# Patient Record
Sex: MALE | Race: BLACK OR AFRICAN AMERICAN | Employment: FULL TIME | URBAN - METROPOLITAN AREA
[De-identification: names, ages, dates, MRNs, and addresses within clinical notes are randomized per-mention and may not be internally consistent; named-entity substitution may affect disease eponyms.]

---

## 2017-07-05 ENCOUNTER — OFFICE VISIT (OUTPATIENT)
Dept: SLEEP MEDICINE | Age: 37
End: 2017-07-05

## 2017-07-05 VITALS
BODY MASS INDEX: 21.47 KG/M2 | TEMPERATURE: 99 F | DIASTOLIC BLOOD PRESSURE: 80 MMHG | HEIGHT: 70 IN | HEART RATE: 81 BPM | WEIGHT: 150 LBS | SYSTOLIC BLOOD PRESSURE: 121 MMHG | OXYGEN SATURATION: 98 %

## 2017-07-05 DIAGNOSIS — G47.61 PERIODIC LIMB MOVEMENTS OF SLEEP: ICD-10-CM

## 2017-07-05 DIAGNOSIS — G47.50 PARASOMNIA: ICD-10-CM

## 2017-07-05 DIAGNOSIS — G47.33 OSA (OBSTRUCTIVE SLEEP APNEA): Primary | ICD-10-CM

## 2017-07-05 NOTE — PATIENT INSTRUCTIONS
217 [facility]., [place]. [place], [address]  Tel.  [phone]  Fax. 100 [facility] 60  [place], [address] [facility]  Tel.  [phone]  Fax. [phone] [address] Jill Rizo  Tel.  [phone]  Fax. [phone]     Sleep Apnea: After Your Visit  Your Care Instructions  Sleep apnea occurs when you frequently stop breathing for 10 seconds or longer during sleep. It can be mild to severe, based on the number of times per hour that you stop breathing or have slowed breathing. Blocked or narrowed airways in your nose, mouth, or throat can cause sleep apnea. Your airway can become blocked when your throat muscles and tongue relax during sleep. Sleep apnea is common, occurring in 1 out of 20 individuals. Individuals having any of the following characteristics should be evaluated and treated right away due to high risk and detrimental consequences from untreated sleep apnea:  1. Obesity  2. Congestive Heart failure  3. Atrial Fibrillation  4. Uncontrolled Hypertension  5. Type II Diabetes  6. Night-time Arrhythmias  7. Stroke  8. Pulmonary Hypertension  9. High-risk Driving Populations (pilots, truck drivers, etc.)  10. Patients Considering Weight-loss Surgery    How do you know you have sleep apnea? You probably have sleep apnea if you answer 'yes' to 3 or more of the following questions:  S - Have you been told that you Snore? T - Are you often Tired during the day? O - Has anyone Observed you stop breathing while sleeping? P- Do you have (or are being treated for) high blood Pressure? B - Are you obese (Body Mass Index > 35)? A - Is your Age 48years old or older? N - Is your Neck size greater than 16 inches? G - Are you male Gender? A sleep physician can prescribe a breathing device that prevents tissues in the throat from blocking your airway.  Or your doctor may recommend using a dental device (oral breathing device) to help keep your airway open. In some cases, surgery may be needed to remove enlarged tissues in the throat. Follow-up care is a key part of your treatment and safety. Be sure to make and go to all appointments, and call your doctor if you are having problems. It's also a good idea to know your test results and keep a list of the medicines you take. How can you care for yourself at home? · Lose weight, if needed. It may reduce the number of times you stop breathing or have slowed breathing. · Go to bed at the same time every night. · Sleep on your side. It may stop mild apnea. If you tend to roll onto your back, sew a pocket in the back of your pajama top. Put a tennis ball into the pocket, and stitch the pocket shut. This will help keep you from sleeping on your back. · Avoid alcohol and medicines such as sleeping pills and sedatives before bed. · Do not smoke. Smoking can make sleep apnea worse. If you need help quitting, talk to your doctor about stop-smoking programs and medicines. These can increase your chances of quitting for good. · Prop up the head of your bed 4 to 6 inches by putting bricks under the legs of the bed. · Treat breathing problems, such as a stuffy nose, caused by a cold or allergies. · Use a continuous positive airway pressure (CPAP) breathing machine if lifestyle changes do not help your apnea and your doctor recommends it. The machine keeps your airway from closing when you sleep. · If CPAP does not help you, ask your doctor whether you should try other breathing machines. A bilevel positive airway pressure machine has two types of air pressureâone for breathing in and one for breathing out. Another device raises or lowers air pressure as needed while you breathe. · If your nose feels dry or bleeds when using one of these machines, talk with your doctor about increasing moisture in the air. A humidifier may help.   · If your nose is runny or stuffy from using a breathing machine, talk with your doctor about using decongestants or a corticosteroid nasal spray. When should you call for help? Watch closely for changes in your health, and be sure to contact your doctor if:  · You still have sleep apnea even though you have made lifestyle changes. · You are thinking of trying a device such as CPAP. · You are having problems using a CPAP or similar machine. Where can you learn more? Go to [employer]. Enter C942 in the search box to learn more about \"Sleep Apnea: After Your Visit. \"   © 6405-6914 Healthwise, Incorporated. Care instructions adapted under license by [facility] (which disclaims liability or warranty for this information). This care instruction is for use with your licensed healthcare professional. If you have questions about a medical condition or this instruction, always ask your healthcare professional. Sheryl Levo any warranty or liability for your use of this information. PROPER SLEEP HYGIENE    What to avoid  · Do not have drinks with caffeine, such as coffee or black tea, for 8 hours before bed. · Do not smoke or use other types of tobacco near bedtime. Nicotine is a stimulant and can keep you awake. · Avoid drinking alcohol late in the evening, because it can cause you to wake in the middle of the night. · Do not eat a big meal close to bedtime. If you are hungry, eat a light snack. · Do not drink a lot of water close to bedtime, because the need to urinate may wake you up during the night. · Do not read or watch TV in bed. Use the bed only for sleeping and sexual activity. What to try  · Go to bed at the same time every night, and wake up at the same time every morning. Do not take naps during the day. · Keep your bedroom quiet, dark, and cool. · Get regular exercise, but not within 3 to 4 hours of your bedtime. .  · Sleep on a comfortable pillow and mattress.   · If watching the clock makes you anxious, turn it facing away from you so you cannot see the time. · If you worry when you lie down, start a worry book. Well before bedtime, write down your worries, and then set the book and your concerns aside. · Try meditation or other relaxation techniques before you go to bed. · If you cannot fall asleep, get up and go to another room until you feel sleepy. Do something relaxing. Repeat your bedtime routine before you go to bed again. · Make your house quiet and calm about an hour before bedtime. Turn down the lights, turn off the TV, log off the computer, and turn down the volume on music. This can help you relax after a busy day. Drowsy Driving  The [address] [place] [address] Road Traffic Safety Administration cites drowsiness as a causing factor in more than 560,339 police reported crashes annually, resulting in 76,000 injuries and 1,500 deaths. Other surveys suggest 55% of people polled have driven while drowsy in the past year, 23% had fallen asleep but not crashed, 3% crashed, and 2% had and accident due to drowsy driving. Who is at risk? Young Drivers: One study of drowsy driving accidents states that 55% of the drivers were under 25 years. Of those, 75% were male. Shift Workers and Travelers: People who work overnight or travel across time zones frequently are at higher risk of experiencing Circadian Rhythm Disorders. They are trying to work and function when their body is programed to sleep. Sleep Deprived: Lack of sleep has a serious impact on your ability to pay attention or focus on a task. Consistently getting less than the average of 8 hours your body needs creates partial or cumulative sleep deprivation. Untreated Sleep Disorders: Sleep Apnea, Narcolepsy, R.L.S., and other sleep disorders (untreated) prevent a person from getting enough restful sleep. This leads to excessive daytime sleepiness and increases the risk for drowsy driving accidents by up to 7 times.   Medications / Alcohol: Even over the counter medications can cause drowsiness. Medications that impair a drivers attention should have a warning label. Alcohol naturally makes you sleepy and on its own can cause accidents. Combined with excessive drowsiness its effects are amplified. Signs of Drowsy Driving:   * You don't remember driving the last few miles   * You may drift out of your cheyanne   * You are unable to focus and your thoughts wander   * You may yawn more often than normal   * You have difficulty keeping your eyes open / nodding off   * Missing traffic signs, speeding, or tailgating  Prevention-   Good sleep hygiene, lifestyle and behavioral choices have the most impact on drowsy driving. There is no substitute for sleep and the average person requires 8 hours nightly. If you find yourself driving drowsy, stop and sleep. Consider the sleep hygiene tips provided during your visit as well. Medication Refill Policy: Refills for all medications require 1 week advance notice. Please have your pharmacy fax a refill request. We are unable to fax, or call in \"controled substance\" medications and you will need to pick these prescriptions up from our office. "[employer] Activation    Thank you for requesting access to "[employer]. Please follow the instructions below to securely access and download your online medical record. "[employer] allows you to send messages to your doctor, view your test results, renew your prescriptions, schedule appointments, and more. How Do I Sign Up? 1. In your internet browser, go to [email or web address]. 2. Click on the First Time User? Click Here link in the Sign In box. You will see the New Member Sign Up page. 3. Enter your "[employer] Access Code exactly as it appears below. You will not need to use this code after youve completed the sign-up process. If you do not sign up before the expiration date, you must request a new code.     "[employer] Access Code: [email or web address]  Expires: 10/3/2017  4:03 PM (This is the date your G[employer] access code will )    4. Enter the last four digits of your Social Security Number (xxxx) and Date of Birth (mm/dd/yyyy) as indicated and click Submit. You will be taken to the next sign-up page. 5. Create a [employer] ID. This will be your Glisten login ID and cannot be changed, so think of one that is secure and easy to remember. 6. Create a Glisten password. You can change your password at any time. 7. Enter your Password Reset Question and Answer. This can be used at a later time if you forget your password. 8. Enter your e-mail address. You will receive e-mail notification when new information is available in [address] 9. Click Sign Up. You can now view and download portions of your medical record. 10. Click the Download Summary menu link to download a portable copy of your medical information. Additional Information    If you have questions, please call [phone]. Remember, [employer] is NOT to be used for urgent needs. For medical emergencies, dial 911.

## 2017-07-05 NOTE — PROGRESS NOTES
217 [facility]., [place]. [place], [address]  Tel.  [phone]  Fax. 100 [facility] 60  [place], [address] [facility]  Tel.  [phone]  Fax. [phone] [address]  Tel.  [phone]  Fax. [phone]         Subjective:      Jd Ashley is an 29 y.o. male referred for evaluation for a sleep disorder. He complains of snoring associated with snorting, choking, periods of not breathing, tossing and turning, kicking and leg twitches. Symptoms began 5 years ago, gradually worsening since that time. He usually can fall asleep in 30 - 45 minutes. Family or house members note snoring, periods of not breathing. He denies completely or partially paralyzed while falling asleep or waking up. Jd Ashley does wake up frequently at night. He is bothered by waking up too early and left unable to get back to sleep. He actually sleeps about 5 hours at night and wakes up about 3 times during the night. He does not work shifts: Angelica Franklin indicates he does not get too little sleep at night. His bedtime is 2200. He awakens at 0300. He does not take naps. He has the following observed behaviors: Loud snoring, Light snoring, Sleep talking, Pauses in breathing, Twitching of legs or feet, Kicking with legs; He does report of an urge to move extremities due to discomfort or other sensation as well as of dream enactment behavior. He reports of trying to catch something and wakes up to find himself doing the same act. Other remarks: waking with a gasp    Elizabeth Sleepiness Score: 11 which reflect moderate daytime drowsiness. No Known Allergies      Current Outpatient Prescriptions:     esomeprazole (NEXIUM) 20 mg capsule, Take 1 Cap by mouth daily. , Disp: 30 Cap, Rfl: 6    melatonin 10 mg TbER, Take 1 Tab by mouth nightly., Disp: 30 Tab, Rfl: 6    magnesium oxide (MAG-OX) 400 mg tablet, Take 1 Tab by mouth nightly., Disp: 30 Tab, Rfl: 6     He  has a past medical history of Bleeding hemorrhoid; Elevated cholesterol; Hypertension; and Pancreatitis. He  has a past surgical history that includes gi and hemorrhoidectomy. He family history includes Breast Cancer in his mother; Heart Attack in his father; Hypertension in his father and mother; No Known Problems in his sister. He  reports that he has been smoking. He uses smokeless tobacco. He reports that he drinks about 16.8 oz of alcohol per week  He reports that he does not use illicit drugs. Review of Systems:  Constitutional:  No significant weight loss or weight gain  Eyes:  No blurred vision  CVS:  No significant chest pain  Pulm:  No significant shortness of breath  GI:  No significant nausea or vomiting  :  No significant nocturia  Musculoskeletal:  No significant joint pain at night  Skin:  No significant rashes  Neuro:  No significant dizziness   Psych:  No active mood issues    Sleep Review of Systems: notable for difficulty falling asleep; frequent awakenings at night;  regular dreaming noted; nightmares ;  early morning headaches; no memory problems; no concentration issues; no history of any automobile or occupational accidents due to daytime drowsiness. Objective:     Visit Vitals    /80    Pulse 81    Temp 99 °F (37.2 °C)    Ht 5' 10\" (1.778 m)    Wt 150 lb (68 kg)    SpO2 98%    BMI 21.52 kg/m2         General:   Not in acute distress   Eyes:  Anicteric sclerae, no obvious strabismus   Nose:  No obvious nasal septum deviation    Oropharynx:   Class 4 oropharyngeal outlet, thick tongue base, uvula could not be seen due to low-lying soft palate, narrow tonsilo-pharyngeal pilars   Tonsils:   tonsils are not seen due to low-lying soft palate   Neck:   Neck circ.  in \"inches\": 13.5; midline trachea   Chest/Lungs:  Equal lung expansion, clear on auscultation    CVS:  Normal rate, regular rhythm; no JVD   Skin:  Warm to touch; no obvious rashes   Neuro:  No focal deficits ; no obvious tremor    Psych:  Normal affect,  normal countenance;          Assessment:       ICD-10-CM ICD-9-CM    1. SIENA (obstructive sleep apnea) G47.33 327.23 SLEEP STUDY UNATTENDED, 4 CHANNEL   2. Periodic limb movements of sleep G47.61 327.51    3. Parasomnia G47.50 307.47          Plan:     * The patient currently has a Moderate Risk for having sleep apnea. STOP-BANG score 4.  * Sleep testing was ordered for initial evaluation. PSG if HSAT is negative for SIENA. * He was provided information on sleep apnea including coresponding risk factors and the importance of proper treatment. * Treatment options if indicated were reviewed today. Patient agrees to a trial of PAP therapy if indicated. * Counseling was provided regarding proper sleep hygiene (including effect of light on sleep), paradoxical intention, stimulus control and safe driving. * Effect of sleep disturbance on weight was reviewed. We have recommended a dedicated weight loss through appropriate diet and an exercise regiment as significant weight reduction has been shown to reduce severity of obstructive sleep apnea. * Telephone [phone]  follow-up shortly after sleep study to review results and plan final management.     (patient has given permission for a message to be left regarding test results and further management if patient cannot be cannot be reached directly). Thank you for allowing us to participate in your patient's medical care. We'll keep you updated on these investigations. Nayely Burton MD, [facility]  Electronically signed.  07/05/17

## 2017-07-11 ENCOUNTER — APPOINTMENT (OUTPATIENT)
Dept: MRI IMAGING | Age: 37
DRG: 092 | End: 2017-07-11
Attending: EMERGENCY MEDICINE
Payer: COMMERCIAL

## 2017-07-11 ENCOUNTER — HOSPITAL ENCOUNTER (INPATIENT)
Age: 37
LOS: 3 days | Discharge: HOME | DRG: 092 | End: 2017-07-14
Attending: EMERGENCY MEDICINE | Admitting: INTERNAL MEDICINE
Payer: COMMERCIAL

## 2017-07-11 ENCOUNTER — APPOINTMENT (OUTPATIENT)
Dept: CT IMAGING | Age: 37
DRG: 092 | End: 2017-07-11
Attending: EMERGENCY MEDICINE
Payer: COMMERCIAL

## 2017-07-11 DIAGNOSIS — G08 TRANSVERSE SINUS THROMBOSIS: ICD-10-CM

## 2017-07-11 DIAGNOSIS — G08 DURAL SINUS THROMBOSIS: Primary | ICD-10-CM

## 2017-07-11 DIAGNOSIS — G44.59 OTHER COMPLICATED HEADACHE SYNDROME: ICD-10-CM

## 2017-07-11 PROBLEM — R51.9 HEADACHE: Status: ACTIVE | Noted: 2017-07-11

## 2017-07-11 PROBLEM — S06.5XAA SUBDURAL HEMATOMA: Status: ACTIVE | Noted: 2017-07-11

## 2017-07-11 LAB
ALBUMIN SERPL BCP-MCNC: 4.3 G/DL (ref 3.5–5)
ALBUMIN/GLOB SERPL: 1.1 {RATIO} (ref 1.1–2.2)
ALP SERPL-CCNC: 81 U/L (ref 45–117)
ALT SERPL-CCNC: 51 U/L (ref 12–78)
ANION GAP BLD CALC-SCNC: 10 MMOL/L (ref 5–15)
APPEARANCE UR: ABNORMAL
APTT PPP: 23.8 SEC (ref 22.1–32.5)
AST SERPL W P-5'-P-CCNC: 97 U/L (ref 15–37)
BACTERIA URNS QL MICRO: NEGATIVE /HPF
BASOPHILS # BLD AUTO: 0 K/UL (ref 0–0.1)
BASOPHILS # BLD: 0 % (ref 0–1)
BILIRUB SERPL-MCNC: 0.5 MG/DL (ref 0.2–1)
BILIRUB UR QL: NEGATIVE
BUN SERPL-MCNC: 6 MG/DL (ref 6–20)
BUN/CREAT SERPL: 8 (ref 12–20)
CALCIUM SERPL-MCNC: 9.2 MG/DL (ref 8.5–10.1)
CHLORIDE SERPL-SCNC: 97 MMOL/L (ref 97–108)
CO2 SERPL-SCNC: 29 MMOL/L (ref 21–32)
COLOR UR: ABNORMAL
CREAT SERPL-MCNC: 0.8 MG/DL (ref 0.7–1.3)
EOSINOPHIL # BLD: 0.1 K/UL (ref 0–0.4)
EOSINOPHIL NFR BLD: 1 % (ref 0–7)
EPITH CASTS URNS QL MICRO: ABNORMAL /LPF
ERYTHROCYTE [DISTWIDTH] IN BLOOD BY AUTOMATED COUNT: 22.4 % (ref 11.5–14.5)
GLOBULIN SER CALC-MCNC: 3.9 G/DL (ref 2–4)
GLUCOSE SERPL-MCNC: 87 MG/DL (ref 65–100)
GLUCOSE UR STRIP.AUTO-MCNC: NEGATIVE MG/DL
HCT VFR BLD AUTO: 33.2 % (ref 36.6–50.3)
HGB BLD-MCNC: 11 G/DL (ref 12.1–17)
HGB UR QL STRIP: ABNORMAL
INR PPP: 0.9 (ref 0.9–1.1)
KETONES UR QL STRIP.AUTO: NEGATIVE MG/DL
LEUKOCYTE ESTERASE UR QL STRIP.AUTO: NEGATIVE
LYMPHOCYTES # BLD AUTO: 22 % (ref 12–49)
LYMPHOCYTES # BLD: 1.3 K/UL (ref 0.8–3.5)
MCH RBC QN AUTO: 26.6 PG (ref 26–34)
MCHC RBC AUTO-ENTMCNC: 33.1 G/DL (ref 30–36.5)
MCV RBC AUTO: 80.2 FL (ref 80–99)
MONOCYTES # BLD: 0.5 K/UL (ref 0–1)
MONOCYTES NFR BLD AUTO: 8 % (ref 5–13)
MUCOUS THREADS URNS QL MICRO: ABNORMAL /LPF
NEUTS SEG # BLD: 4 K/UL (ref 1.8–8)
NEUTS SEG NFR BLD AUTO: 69 % (ref 32–75)
NITRITE UR QL STRIP.AUTO: NEGATIVE
PH UR STRIP: 6 [PH] (ref 5–8)
PLATELET # BLD AUTO: 234 K/UL (ref 150–400)
POTASSIUM SERPL-SCNC: 4.4 MMOL/L (ref 3.5–5.1)
PROT SERPL-MCNC: 8.2 G/DL (ref 6.4–8.2)
PROT UR STRIP-MCNC: 100 MG/DL
PROTHROMBIN TIME: 9.2 SEC (ref 9–11.1)
RBC # BLD AUTO: 4.14 M/UL (ref 4.1–5.7)
RBC #/AREA URNS HPF: ABNORMAL /HPF (ref 0–5)
RBC MORPH BLD: ABNORMAL
SODIUM SERPL-SCNC: 136 MMOL/L (ref 136–145)
SP GR UR REFRACTOMETRY: >1.03 (ref 1–1.03)
THERAPEUTIC RANGE,PTTT: NORMAL SECS (ref 58–77)
UROBILINOGEN UR QL STRIP.AUTO: 1 EU/DL (ref 0.2–1)
WBC # BLD AUTO: 5.9 K/UL (ref 4.1–11.1)
WBC URNS QL MICRO: ABNORMAL /HPF (ref 0–4)

## 2017-07-11 PROCEDURE — 74011250636 HC RX REV CODE- 250/636: Performed by: EMERGENCY MEDICINE

## 2017-07-11 PROCEDURE — 96374 THER/PROPH/DIAG INJ IV PUSH: CPT

## 2017-07-11 PROCEDURE — 70553 MRI BRAIN STEM W/O & W/DYE: CPT

## 2017-07-11 PROCEDURE — 99285 EMERGENCY DEPT VISIT HI MDM: CPT

## 2017-07-11 PROCEDURE — 96361 HYDRATE IV INFUSION ADD-ON: CPT

## 2017-07-11 PROCEDURE — 74011000250 HC RX REV CODE- 250: Performed by: EMERGENCY MEDICINE

## 2017-07-11 PROCEDURE — 36415 COLL VENOUS BLD VENIPUNCTURE: CPT | Performed by: EMERGENCY MEDICINE

## 2017-07-11 PROCEDURE — 80053 COMPREHEN METABOLIC PANEL: CPT | Performed by: EMERGENCY MEDICINE

## 2017-07-11 PROCEDURE — 70450 CT HEAD/BRAIN W/O DYE: CPT

## 2017-07-11 PROCEDURE — 65660000000 HC RM CCU STEPDOWN

## 2017-07-11 PROCEDURE — 74011250637 HC RX REV CODE- 250/637: Performed by: EMERGENCY MEDICINE

## 2017-07-11 PROCEDURE — 70544 MR ANGIOGRAPHY HEAD W/O DYE: CPT

## 2017-07-11 PROCEDURE — 81001 URINALYSIS AUTO W/SCOPE: CPT | Performed by: EMERGENCY MEDICINE

## 2017-07-11 PROCEDURE — 96376 TX/PRO/DX INJ SAME DRUG ADON: CPT

## 2017-07-11 PROCEDURE — 96375 TX/PRO/DX INJ NEW DRUG ADDON: CPT

## 2017-07-11 PROCEDURE — 77030032490 HC SLV COMPR SCD KNE COVD -B

## 2017-07-11 PROCEDURE — 85610 PROTHROMBIN TIME: CPT | Performed by: EMERGENCY MEDICINE

## 2017-07-11 PROCEDURE — 74011250636 HC RX REV CODE- 250/636: Performed by: INTERNAL MEDICINE

## 2017-07-11 PROCEDURE — A9576 INJ PROHANCE MULTIPACK: HCPCS | Performed by: EMERGENCY MEDICINE

## 2017-07-11 PROCEDURE — 85025 COMPLETE CBC W/AUTO DIFF WBC: CPT | Performed by: EMERGENCY MEDICINE

## 2017-07-11 PROCEDURE — 85730 THROMBOPLASTIN TIME PARTIAL: CPT | Performed by: EMERGENCY MEDICINE

## 2017-07-11 PROCEDURE — 74011250637 HC RX REV CODE- 250/637: Performed by: INTERNAL MEDICINE

## 2017-07-11 RX ORDER — ONDANSETRON 4 MG/1
8 TABLET, ORALLY DISINTEGRATING ORAL
Status: DISCONTINUED | OUTPATIENT
Start: 2017-07-11 | End: 2017-07-11

## 2017-07-11 RX ORDER — LORAZEPAM 2 MG/ML
1 INJECTION INTRAMUSCULAR
Status: DISCONTINUED | OUTPATIENT
Start: 2017-07-11 | End: 2017-07-13

## 2017-07-11 RX ORDER — ONDANSETRON 2 MG/ML
4 INJECTION INTRAMUSCULAR; INTRAVENOUS
Status: DISCONTINUED | OUTPATIENT
Start: 2017-07-11 | End: 2017-07-14 | Stop reason: HOSPADM

## 2017-07-11 RX ORDER — ACETAMINOPHEN 325 MG/1
650 TABLET ORAL
Status: DISCONTINUED | OUTPATIENT
Start: 2017-07-11 | End: 2017-07-11

## 2017-07-11 RX ORDER — LORAZEPAM 2 MG/ML
2 INJECTION INTRAMUSCULAR
Status: DISCONTINUED | OUTPATIENT
Start: 2017-07-11 | End: 2017-07-13

## 2017-07-11 RX ORDER — FLUTICASONE PROPIONATE 50 MCG
2 SPRAY, SUSPENSION (ML) NASAL DAILY
Status: DISCONTINUED | OUTPATIENT
Start: 2017-07-12 | End: 2017-07-14 | Stop reason: HOSPADM

## 2017-07-11 RX ORDER — LABETALOL HYDROCHLORIDE 5 MG/ML
10 INJECTION, SOLUTION INTRAVENOUS
Status: DISCONTINUED | OUTPATIENT
Start: 2017-07-11 | End: 2017-07-14 | Stop reason: HOSPADM

## 2017-07-11 RX ORDER — DIPHENHYDRAMINE HYDROCHLORIDE 50 MG/ML
12.5 INJECTION, SOLUTION INTRAMUSCULAR; INTRAVENOUS
Status: COMPLETED | OUTPATIENT
Start: 2017-07-11 | End: 2017-07-11

## 2017-07-11 RX ORDER — HYDRALAZINE HYDROCHLORIDE 20 MG/ML
10 INJECTION INTRAMUSCULAR; INTRAVENOUS
Status: DISCONTINUED | OUTPATIENT
Start: 2017-07-11 | End: 2017-07-14 | Stop reason: HOSPADM

## 2017-07-11 RX ORDER — ENOXAPARIN SODIUM 100 MG/ML
1 INJECTION SUBCUTANEOUS EVERY 12 HOURS
Status: DISCONTINUED | OUTPATIENT
Start: 2017-07-11 | End: 2017-07-14 | Stop reason: HOSPADM

## 2017-07-11 RX ORDER — ONDANSETRON 4 MG/1
4 TABLET, ORALLY DISINTEGRATING ORAL
Status: COMPLETED | OUTPATIENT
Start: 2017-07-11 | End: 2017-07-11

## 2017-07-11 RX ORDER — ACETAMINOPHEN 500 MG
1000 TABLET ORAL
Status: COMPLETED | OUTPATIENT
Start: 2017-07-11 | End: 2017-07-11

## 2017-07-11 RX ORDER — MORPHINE SULFATE 4 MG/ML
4 INJECTION, SOLUTION INTRAMUSCULAR; INTRAVENOUS
Status: COMPLETED | OUTPATIENT
Start: 2017-07-11 | End: 2017-07-11

## 2017-07-11 RX ORDER — MORPHINE SULFATE 4 MG/ML
4 INJECTION, SOLUTION INTRAMUSCULAR; INTRAVENOUS
Status: ACTIVE | OUTPATIENT
Start: 2017-07-11 | End: 2017-07-11

## 2017-07-11 RX ORDER — SODIUM CHLORIDE 9 MG/ML
2000 INJECTION, SOLUTION INTRAVENOUS ONCE
Status: COMPLETED | OUTPATIENT
Start: 2017-07-11 | End: 2017-07-11

## 2017-07-11 RX ORDER — LORAZEPAM 2 MG/ML
1 INJECTION INTRAMUSCULAR
Status: COMPLETED | OUTPATIENT
Start: 2017-07-11 | End: 2017-07-11

## 2017-07-11 RX ORDER — BUTALBITAL, ACETAMINOPHEN AND CAFFEINE 50; 325; 40 MG/1; MG/1; MG/1
1 TABLET ORAL
Status: DISCONTINUED | OUTPATIENT
Start: 2017-07-11 | End: 2017-07-14 | Stop reason: HOSPADM

## 2017-07-11 RX ORDER — ACETAMINOPHEN 325 MG/1
650 TABLET ORAL
Status: DISCONTINUED | OUTPATIENT
Start: 2017-07-11 | End: 2017-07-14 | Stop reason: HOSPADM

## 2017-07-11 RX ADMIN — LORAZEPAM 1 MG: 2 INJECTION INTRAMUSCULAR; INTRAVENOUS at 11:43

## 2017-07-11 RX ADMIN — ENOXAPARIN SODIUM 60 MG: 60 INJECTION SUBCUTANEOUS at 22:44

## 2017-07-11 RX ADMIN — MORPHINE SULFATE 4 MG: 4 INJECTION, SOLUTION INTRAMUSCULAR; INTRAVENOUS at 15:44

## 2017-07-11 RX ADMIN — ACETAMINOPHEN 1000 MG: 500 TABLET ORAL at 08:32

## 2017-07-11 RX ADMIN — DIPHENHYDRAMINE HYDROCHLORIDE 12.5 MG: 50 INJECTION, SOLUTION INTRAMUSCULAR; INTRAVENOUS at 10:15

## 2017-07-11 RX ADMIN — GADOTERIDOL 12 ML: 279.3 INJECTION, SOLUTION INTRAVENOUS at 19:25

## 2017-07-11 RX ADMIN — BUTALBITAL, ACETAMINOPHEN AND CAFFEINE 1 TABLET: 50; 325; 40 TABLET ORAL at 20:04

## 2017-07-11 RX ADMIN — SODIUM CHLORIDE 10 MG: 9 INJECTION INTRAMUSCULAR; INTRAVENOUS; SUBCUTANEOUS at 10:15

## 2017-07-11 RX ADMIN — ONDANSETRON 4 MG: 4 TABLET, ORALLY DISINTEGRATING ORAL at 08:33

## 2017-07-11 RX ADMIN — SODIUM CHLORIDE 2000 ML: 900 INJECTION, SOLUTION INTRAVENOUS at 10:14

## 2017-07-11 NOTE — H&P
Hospitalist Admission Note    NAME: Maciej Bay   :  1980   MRN:  [id number]     Date/Time:  2017 6:20 PM    Patient PCP: Harsh Manzo, NP  ________________________________________________________________________    My assessment of this patient's clinical condition and my plan of care is as follows. Assessment / Plan:  Addendum:  Prelim MR read with consisten with dural sinus thrombosis and no mention of acute bleed. Discussed with on call radiology and no evidence of bleed. Will start therapeutic anticoagulation with lovenox. Neurology has been consulted. \"MR of the brain demonstrates thrombosis of a superficial cortical vein left  tentorium. There is thrombus in the left transverse sinus. There may be  nonocclusive clot in the left sigmoid sinus and there is diminished flow. No  acute infarction is identified.   The MRV demonstrates thrombus in the left transverse sinus and possibly small  nonocclusive extending into the left sigmoid sinus. There is diminished flow in  the sigmoid sinus and left internal jugular vein most likely related to the  Thrombus. \"    Headache  CT head with possible subdural hematoma vs SAH and concern for dural sinus thrombosis  -likely multifactorial in setting of CT findings in addition to reported sinus congestion and musculoskeletal neck pain.  -Head CT- Subtle left tentorium subdural more likely than subarachnoid hemorrhage. Questionable adjacent thrombosis of the left transverse sinus.  -Admit to PCU pending MRI and MRV. -check Lipid panel, A1C  -Admit to telemetry  -neuro checks per protocol  -Prn fioricet. Prn zofran. -BP control to maintain SBP <160.  -Neurosurgery consulted in ED.  -Consult neurology. Alcohol abuse  Hx of fatty liver  Elevated AST 97  -4-5 beers daily  -Denies history of DT's  -Banana bag daily  -CIWA with prn IV ativan    Tobacco abuse  -smokes 2-3 cigarettes daily  - cessation.  Refuses nicotine patch.    Hypertriglyceridemia  -hx of pancreatitis in the past. Not on meds. Check lipid panel. Code Status: full  Surrogate Decision Maker: wife  DVT Prophylaxis: started therapeutic lovenox for dural sinus thrombosis as in addendum. GI Prophylaxis: not indicated  Baseline: independent        Subjective:   CHIEF COMPLAINT: headache    HISTORY OF PRESENT ILLNESS:     Lara Coleman is a 29 y.o.  male with PMH of hypertriglyceridemia, alcohol abuse, and tobacco use. Presents to the ED with complaint of headache for ~ 1 month. Denies history of headache in the past. Says pain can reach 10/10 and is located on the  \"top of his head. \" Reports associated photophobia and nausea/vomiting. No change in vision, sensation, or strength. He reports having a cold two weeks ago with sinus congestion which has not completely resolved. He works as a  and often experiences stiff necks, musculoskeletal in nature, which improve with massage. He feels both the sinus congestion and stiff neck are contributing to his headache. Says was taking dayquil, nyquil, and mucinex for his cold. He was taking Excedrin migraine, and goody's powder without much relief for his headache. Only home medication is Nexium. He smokes 2-3 cigarettes daily. drinks 4-5 beers daily and has had pancreatitis in the past related to alcohol vs hypertriglyceridemia. In ED CT is concerning for subtle left tentorium subdural hematoma vs SAH in addition to questionable adjacent thrombosis of left transverse sinus. ED physician spoke with neurosurgery and MRI/MRV is pending. We were asked to admit for work up and evaluation of the above problems.      Past Medical History:   Diagnosis Date    Bleeding hemorrhoid     Elevated cholesterol     Hypertension     Pancreatitis         Past Surgical History:   Procedure Laterality Date    HX GI      HX HEMORRHOIDECTOMY      2013       Social History   Substance Use Topics    Smoking status: Current Every Day Smoker     Packs/day: 0.25    Smokeless tobacco: Current User    Alcohol use Yes      Comment: one beer per day        Family History   Problem Relation Age of Onset    Hypertension Mother     Breast Cancer Mother     Hypertension Father     Heart Attack Father     No Known Problems Sister      Allergies   Allergen Reactions    Peanut Rash     Face swelling, scratchy throat, rash        Prior to Admission medications    Medication Sig Start Date End Date Taking? Authorizing Provider   OTHER Take 500-1,000 mg by mouth every six (6) hours as needed for Pain. Patient stated he takes 'something for tension' that was 500 mg. Yes Kim Estrada MD   esomeprazole (NEXIUM) 20 mg capsule Take 1 Cap by mouth daily. 10/20/16  Yes Roselia Stanton NP       REVIEW OF SYSTEMS:     I am not able to complete the review of systems because:    The patient is intubated and sedated    The patient has altered mental status due to his acute medical problems    The patient has baseline aphasia from prior stroke(s)    The patient has baseline dementia and is not reliable historian    The patient is in acute medical distress and unable to provide information           Total of 12 systems reviewed as follows:       POSITIVE= underlined text  Negative = text not underlined  General:  fever, chills, sweats, generalized weakness, weight loss/gain,      loss of appetite   Eyes:    blurred vision, eye pain, loss of vision, double vision  ENT:    Nasal congestion, rhinorrhea, pharyngitis   Respiratory:   cough, sputum production, SOB, ALEX, wheezing, pleuritic pain   Cardiology:   chest pain, palpitations, orthopnea, PND, edema, syncope   Gastrointestinal:  abdominal pain , N/V, diarrhea, dysphagia, constipation, bleeding   Genitourinary:  frequency, urgency, dysuria, hematuria, incontinence   Muskuloskeletal :  arthralgia, myalgia, back pain, neck stiffness  Hematology:  easy bruising, nose or gum bleeding, lymphadenopathy   Dermatological: rash, ulceration, pruritis, color change / jaundice  Endocrine:   hot flashes or polydipsia   Neurological:  Headache,photophobia, dizziness, confusion, focal weakness, paresthesia,     Speech difficulties, memory loss, gait difficulty  Psychological: Feelings of anxiety, depression, agitation    Objective:   VITALS:    Visit Vitals    BP (!) 147/93    Pulse 88    Temp 98.4 °F (36.9 °C)    Resp 18    Ht 5' 10\" (1.778 m)    Wt 62.4 kg (137 lb 9.1 oz)    SpO2 99%    BMI 19.74 kg/m2       PHYSICAL EXAM:    General:    Alert, cooperative, no distress, appears stated age. HEENT: Atraumatic, anicteric sclerae, pink conjunctivae     No oral ulcers, mucosa moist, throat clear, dentition fair  Neck:  Supple, symmetrical,  thyroid: non tender  Lungs:   Clear to auscultation bilaterally. No Wheezing or Rhonchi. No rales. Chest wall:  No tenderness  No Accessory muscle use. Heart:   Regular  rhythm,  No  murmur   No edema  Abdomen:   Soft, non-tender. Not distended. Bowel sounds normal  Extremities: No cyanosis. No clubbing,      Skin turgor normal, Capillary refill normal, Radial dial pulse 2+  Skin:     Not pale. Not Jaundiced  No rashes   Psych:  Good insight. Not depressed. Not anxious or agitated. Neurologic: EOMs intact. No facial asymmetry. No aphasia or slurred speech. Symmetrical strength, Sensation grossly intact.  Alert and oriented X 4.     _______________________________________________________________________  Care Plan discussed with:    Comments   Patient y    Family  y  wife and son   RN     Care Manager                    Consultant:      _______________________________________________________________________  Expected  Disposition:   Home with Family x   HH/PT/OT/RN    SNF/LTC    [facility]    ________________________________________________________________________  TOTAL TIME:  61 Minutes    Critical Care Provided     Minutes non procedure based      Comments x Reviewed previous records   >50% of visit spent in counseling and coordination of care  Discussion with patient and/or family and questions answered       ________________________________________________________________________  Signed: Zafar Maravilla MD    Procedures: see electronic medical records for all procedures/Xrays and details which were not copied into this note but were reviewed prior to creation of Plan. LAB DATA REVIEWED:    Recent Results (from the past 24 hour(s))   CBC WITH AUTOMATED DIFF    Collection Time: 07/11/17 10:03 AM   Result Value Ref Range    WBC 5.9 4.1 - 11.1 K/uL    RBC 4.14 4.10 - 5.70 M/uL    HGB 11.0 (L) 12.1 - 17.0 g/dL    HCT 33.2 (L) 36.6 - 50.3 %    MCV 80.2 80.0 - 99.0 FL    MCH 26.6 26.0 - 34.0 PG    MCHC 33.1 30.0 - 36.5 g/dL    RDW 22.4 (H) 11.5 - 14.5 %    PLATELET 008 975 - 711 K/uL    NEUTROPHILS 69 32 - 75 %    LYMPHOCYTES 22 12 - 49 %    MONOCYTES 8 5 - 13 %    EOSINOPHILS 1 0 - 7 %    BASOPHILS 0 0 - 1 %    ABS. NEUTROPHILS 4.0 1.8 - 8.0 K/UL    ABS. LYMPHOCYTES 1.3 0.8 - 3.5 K/UL    ABS. MONOCYTES 0.5 0.0 - 1.0 K/UL    ABS. EOSINOPHILS 0.1 0.0 - 0.4 K/UL    ABS.  BASOPHILS 0.0 0.0 - 0.1 K/UL    RBC COMMENTS POLYCHROMASIA  PRESENT        RBC COMMENTS ANISOCYTOSIS  2+        RBC COMMENTS TARGET CELLS  1+       PROTHROMBIN TIME + INR    Collection Time: 07/11/17 10:03 AM   Result Value Ref Range    INR 0.9 0.9 - 1.1      Prothrombin time 9.2 9.0 - 11.1 sec   PTT    Collection Time: 07/11/17 10:03 AM   Result Value Ref Range    aPTT 23.8 22.1 - 32.5 sec    aPTT, therapeutic range     58.0 - 49.9 SECS   METABOLIC PANEL, COMPREHENSIVE    Collection Time: 07/11/17 10:03 AM   Result Value Ref Range    Sodium 136 136 - 145 mmol/L    Potassium 4.4 3.5 - 5.1 mmol/L    Chloride 97 97 - 108 mmol/L    CO2 29 21 - 32 mmol/L    Anion gap 10 5 - 15 mmol/L    Glucose 87 65 - 100 mg/dL    BUN 6 6 - 20 MG/DL    Creatinine 0.80 0.70 - 1.30 MG/DL    BUN/Creatinine ratio 8 (L) 12 - 20      GFR est AA >60 >60 ml/min/1.73m2    GFR est non-AA >60 >60 ml/min/1.73m2    Calcium 9.2 8.5 - 10.1 MG/DL    Bilirubin, total 0.5 0.2 - 1.0 MG/DL    ALT (SGPT) 51 12 - 78 U/L    AST (SGOT) 97 (H) 15 - 37 U/L    Alk.  phosphatase 81 45 - 117 U/L    Protein, total 8.2 6.4 - 8.2 g/dL    Albumin 4.3 3.5 - 5.0 g/dL    Globulin 3.9 2.0 - 4.0 g/dL    A-G Ratio 1.1 1.1 - 2.2     URINALYSIS W/MICROSCOPIC    Collection Time: 07/11/17 11:24 AM   Result Value Ref Range    Color YELLOW/STRAW      Appearance CLOUDY (A) CLEAR      Specific gravity >1.030 (H) 1.003 - 1.030    pH (UA) 6.0 5.0 - 8.0      Protein 100 (A) NEG mg/dL    Glucose NEGATIVE  NEG mg/dL    Ketone NEGATIVE  NEG mg/dL    Bilirubin NEGATIVE  NEG      Blood LARGE (A) NEG      Urobilinogen 1.0 0.2 - 1.0 EU/dL    Nitrites NEGATIVE  NEG      Leukocyte Esterase NEGATIVE  NEG      WBC 0-4 0 - 4 /hpf    RBC 0-5 0 - 5 /hpf    Epithelial cells FEW FEW /lpf    Bacteria NEGATIVE  NEG /hpf    Mucus 2+ (A) NEG /lpf

## 2017-07-11 NOTE — ED PROVIDER NOTES
HPI Comments: Anjana Preciado is a 29 y.o. male with pertinent PMHx of HTN and elevated cholesterol who presents ambulatatory to ED c/o persistent HA for the past month, along with associated intermittent episodes of vomiting for the past month. Pt also c/o nasal congestion and sinus pain for the past 2 weeks. Pt states that his HA is worse on the left posterior head. Pt also c/o photophobia, sound sensitivity and a \"pin pricking\" sensation in bilateral fingertips. Pt reports decreased PO intake secondary to his symptoms. Pt's family member reports that he has had difficulty with his balance for the past month. Pt's family member states that his symptoms are alleviated by eating ice or fruit. Pt denies any recent travel. Pt denies any Hx of DM. Pt specifically denies any visual disturbance. PCP:  Arthur Bhakta NP    Social Hx:  tobacco use (+, .25 PPD), EtOH use (+)    There are no other complaints, changes or physical findings at this time. The history is provided by the patient. No  was used. Past Medical History:   Diagnosis Date    Bleeding hemorrhoid     Elevated cholesterol     Hypertension     Pancreatitis        Past Surgical History:   Procedure Laterality Date    HX GI      HX HEMORRHOIDECTOMY      2013         Family History:   Problem Relation Age of Onset    Hypertension Mother     Breast Cancer Mother     Hypertension Father     Heart Attack Father     No Known Problems Sister        Social History     Social History    Marital status:      Spouse name: N/A    Number of children: N/A    Years of education: N/A     Occupational History    Not on file.      Social History Main Topics    Smoking status: Current Every Day Smoker     Packs/day: 0.25    Smokeless tobacco: Current User    Alcohol use Yes      Comment: one beer per day    Drug use: Yes     Special: Marijuana    Sexual activity: Yes     Partners: Female     Other Topics Concern    Not on file     Social History Narrative         ALLERGIES: Peanut    Review of Systems   Constitutional: Negative for chills and fever. HENT: Positive for congestion and sinus pressure.         (+) sound sensitivity    Eyes: Positive for photophobia. Negative for visual disturbance. Respiratory: Negative for chest tightness. Cardiovascular: Negative for chest pain and leg swelling. Gastrointestinal: Positive for vomiting. Negative for abdominal pain. Endocrine: Negative for polyuria. Genitourinary: Negative for dysuria and frequency. Musculoskeletal: Negative for myalgias. Skin: Negative for color change. Allergic/Immunologic: Negative for immunocompromised state. Neurological: Positive for headaches. Negative for numbness.        (+) pin pricking sensation in bilateral fingertips  (+) difficulty with balance       Patient Vitals for the past 12 hrs:   Temp Pulse Resp BP SpO2   07/11/17 1800 - - - 138/90 98 %   07/11/17 1730 - - - 142/88 99 %   07/11/17 1706 - - - (!) 147/93 99 %   07/11/17 1700 - - - - 98 %   07/11/17 1630 - - - - 99 %   07/11/17 1515 - - - - 99 %   07/11/17 1130 - - - 148/78 98 %   07/11/17 1100 - - - 131/81 99 %   07/11/17 1004 - 88 - (!) 141/92 -   07/11/17 1003 - 78 - (!) 127/93 -   07/11/17 1001 - 73 - 128/82 -   07/11/17 0818 98.4 °F (36.9 °C) 80 18 146/90 97 %       Physical Exam     Nursing note and vitals reviewed. General appearance: non-toxic, NAD  Eyes: PERRL, EOMI, conjunctiva normal, anicteric sclera. Visual fields full. HEENT: mucous membranes slightly dry, oropharynx is clear, neck is supple. Tenderness to palpation of ethmoid sinus. Cerumen in the right canal, no erythema, TM's otherwise clear. Pulmonary: clear to auscultation bilaterally  Cardiac: normal rate and regular rhythm, no murmurs, gallops, or rubs, 2+ peripheral pulses, no carotid bruits, cap refill < 2 seconds  Abdomen: soft, nontender, nondistended, bowel sounds present.  No CVA tenderness. MSK: no lower extremity edema. Neuro: Alert, answers questions appropriately, 5/5 strength in all 4 extremities, VFF, finger to nose normal, Slightly decreased with light touch to right hand, cranial nerves II-XII intact. No drift. Feels slightly unsteady when changing to a standing position. MDM  Number of Diagnoses or Management Options  Dural sinus thrombosis:   Diagnosis management comments: DDx: CT notable for possible SDH with associated dural sinus thrombosis. recent sinus congestion maybe be contributing etiology; will obtain MRI with contrast and MR venogram.        Amount and/or Complexity of Data Reviewed  Clinical lab tests: ordered and reviewed  Tests in the radiology section of CPT®: ordered and reviewed  Obtain history from someone other than the patient: yes (Family member)  Discuss the patient with other providers: yes (Hospitalist  Neurosurgery)    Patient Progress  Patient progress: stable    ED Course       Procedures      CONSULT NOTE:   11:35 AM  Byron Brown MD spoke with Dr. Roland Anderson,   Specialty: Hospitalist  Discussed pt's hx, disposition, and available diagnostic and imaging results. Reviewed care plans. Consultant will evaluate pt for admission. Written by BILL Fernándezibe, as dictated by Byron Brown MD.    CONSULT NOTE:   11:58 AM  Byron Brown MD spoke with Dr. Magan Herrera,   Specialty: Neurosurgery  Discussed pt's hx, disposition, and available diagnostic and imaging results. Reviewed care plans. Consultant agrees with plans as outlined. Written by BILL Fernández, as dictated by Byron Brown MD.    Progress Note  5:32  MRI ordered at 9:42 AM. Calls made to MRI for evaluation of abnormal CT. Unable to obtain STAT MRI due to current MRI case load despite repeated attempts to obtain imaging.      CONSULT NOTE:   5:29 PM  Byron Brown MD spoke with Dr. Pillo Anderson,   Specialty: Neurosurgery  Discussed pt's hx, disposition, and available diagnostic and imaging results. Reviewed care plans. Consultant agrees with plans as outlined. Written by Santana Reinoso, ED Scribe, as dictated by Wiley Huff MD.    LABORATORY TESTS:  Recent Results (from the past 12 hour(s))   CBC WITH AUTOMATED DIFF    Collection Time: 07/11/17 10:03 AM   Result Value Ref Range    WBC 5.9 4.1 - 11.1 K/uL    RBC 4.14 4.10 - 5.70 M/uL    HGB 11.0 (L) 12.1 - 17.0 g/dL    HCT 33.2 (L) 36.6 - 50.3 %    MCV 80.2 80.0 - 99.0 FL    MCH 26.6 26.0 - 34.0 PG    MCHC 33.1 30.0 - 36.5 g/dL    RDW 22.4 (H) 11.5 - 14.5 %    PLATELET 720 216 - 155 K/uL    NEUTROPHILS 69 32 - 75 %    LYMPHOCYTES 22 12 - 49 %    MONOCYTES 8 5 - 13 %    EOSINOPHILS 1 0 - 7 %    BASOPHILS 0 0 - 1 %    ABS. NEUTROPHILS 4.0 1.8 - 8.0 K/UL    ABS. LYMPHOCYTES 1.3 0.8 - 3.5 K/UL    ABS. MONOCYTES 0.5 0.0 - 1.0 K/UL    ABS. EOSINOPHILS 0.1 0.0 - 0.4 K/UL    ABS. BASOPHILS 0.0 0.0 - 0.1 K/UL    RBC COMMENTS POLYCHROMASIA  PRESENT        RBC COMMENTS ANISOCYTOSIS  2+        RBC COMMENTS TARGET CELLS  1+       PROTHROMBIN TIME + INR    Collection Time: 07/11/17 10:03 AM   Result Value Ref Range    INR 0.9 0.9 - 1.1      Prothrombin time 9.2 9.0 - 11.1 sec   PTT    Collection Time: 07/11/17 10:03 AM   Result Value Ref Range    aPTT 23.8 22.1 - 32.5 sec    aPTT, therapeutic range     58.0 - 24.8 SECS   METABOLIC PANEL, COMPREHENSIVE    Collection Time: 07/11/17 10:03 AM   Result Value Ref Range    Sodium 136 136 - 145 mmol/L    Potassium 4.4 3.5 - 5.1 mmol/L    Chloride 97 97 - 108 mmol/L    CO2 29 21 - 32 mmol/L    Anion gap 10 5 - 15 mmol/L    Glucose 87 65 - 100 mg/dL    BUN 6 6 - 20 MG/DL    Creatinine 0.80 0.70 - 1.30 MG/DL    BUN/Creatinine ratio 8 (L) 12 - 20      GFR est AA >60 >60 ml/min/1.73m2    GFR est non-AA >60 >60 ml/min/1.73m2    Calcium 9.2 8.5 - 10.1 MG/DL    Bilirubin, total 0.5 0.2 - 1.0 MG/DL    ALT (SGPT) 51 12 - 78 U/L    AST (SGOT) 97 (H) 15 - 37 U/L    Alk.  phosphatase 81 45 - 117 U/L Protein, total 8.2 6.4 - 8.2 g/dL    Albumin 4.3 3.5 - 5.0 g/dL    Globulin 3.9 2.0 - 4.0 g/dL    A-G Ratio 1.1 1.1 - 2.2     URINALYSIS W/MICROSCOPIC    Collection Time: 07/11/17 11:24 AM   Result Value Ref Range    Color YELLOW/STRAW      Appearance CLOUDY (A) CLEAR      Specific gravity >1.030 (H) 1.003 - 1.030    pH (UA) 6.0 5.0 - 8.0      Protein 100 (A) NEG mg/dL    Glucose NEGATIVE  NEG mg/dL    Ketone NEGATIVE  NEG mg/dL    Bilirubin NEGATIVE  NEG      Blood LARGE (A) NEG      Urobilinogen 1.0 0.2 - 1.0 EU/dL    Nitrites NEGATIVE  NEG      Leukocyte Esterase NEGATIVE  NEG      WBC 0-4 0 - 4 /hpf    RBC 0-5 0 - 5 /hpf    Epithelial cells FEW FEW /lpf    Bacteria NEGATIVE  NEG /hpf    Mucus 2+ (A) NEG /lpf       IMAGING RESULTS:  CT HEAD WO CONT   Final Result   CT Results  (Last 48 hours)               07/11/17 0848  CT HEAD WO CONT Final result    Impression:  IMPRESSION:    Subtle left tentorium subdural more likely than subarachnoid hemorrhage. Questionable adjacent thrombosis of the left transverse sinus. No mass effect or   hydrocephalus. Recommend MRI brain with IV contrast and MR venography. I discussed this impression with Dr. Riley Ricci at 0425 hours on 7/11/2017.   [id number]               Narrative:  EXAM:  CT HEAD WO CONT       INDICATION: Constant headache and photophobia for one month. Intermittent nausea   and vomiting. Chronic hypertension and hypercholesterolemia. COMPARISON: None       TECHNIQUE: Noncontrast head CT. Coronal and sagittal reformats. CT dose   reduction was achieved through the use of a standardized protocol tailored for   this examination and automatic exposure control for dose modulation. FINDINGS: Increased attenuation on the left tentorium cerebelli is shallow   without mass effect. There is hypoattenuation in the adjacent left transverse   sinus. Ventricles and sulci are within normal limits. No hydrocephalus. No CT evidence   of acute infarct.  No mass effect or midline shift. The calvarium is intact. The visualized paranasal sinuses and mastoid air cells   are clear. MRI BRAIN W WO CONT    (Results Pending)   MRI BRAIN MRV WO CONT    (Results Pending)       MEDICATIONS GIVEN:  Medications   morphine injection 4 mg (0 mg IntraVENous Held 7/11/17 0939)   acetaminophen (TYLENOL) tablet 1,000 mg (1,000 mg Oral Given 7/11/17 0832)   ondansetron (ZOFRAN ODT) tablet 4 mg (4 mg Oral Given 7/11/17 0833)   0.9% sodium chloride infusion 2,000 mL (2,000 mL IntraVENous New Bag 7/11/17 1014)   prochlorperazine (COMPAZINE) with saline injection 10 mg (10 mg IntraVENous Given 7/11/17 1015)   diphenhydrAMINE (BENADRYL) injection 12.5 mg (12.5 mg IntraVENous Given 7/11/17 1015)   LORazepam (ATIVAN) injection 1 mg (1 mg IntraVENous Given 7/11/17 1143)   morphine injection 4 mg (4 mg IntraVENous Given 7/11/17 1544)       IMPRESSION:  1. Dural sinus thrombosis    2. Other complicated headache syndrome    3. Transverse sinus thrombosis      PLAN:  1. Admit to hospitalist.    Admit Note:  6:54 PM  Pt is being admitted by Dr. Milton Brown. The results of their tests and reason(s) for their admission have been discussed with pt and/or available family. They convey agreement and understanding for the need to be admitted and for admission diagnosis. This note is prepared by Krystle Luna, acting as a Scribe for Jason Gunn MD.    Jason Gunn MD: The scribe's documentation has been prepared under my direction and personally reviewed by me in its entirety. I confirm that the notes above accurately reflects all work, treatment, procedures, and medical decision making performed by me.

## 2017-07-11 NOTE — ED NOTES
Bedside shift change report given to Tre Adames  (oncoming nurse) by Nesha Ackerman RN (offgoing nurse). Report included the following information SBAR, Kardex, ED Summary and MAR.

## 2017-07-11 NOTE — PROGRESS NOTES
Pharmacy Clarification of Prior to Admission Medication Regimen     The patient was interviewed regarding clarification of the prior to admission medication regimen and was questioned regarding use of any other inhalers, topical products, over the counter medications, herbal medications, vitamin products or ophthalmic/nasal/otic medication use. Information Obtained From: Patient, RX Query    Pertinent Pharmacy Findings:   Other: Patient stated he takes 'something for tension' that was 311 mg but was uncertain on the medication. PTA medication list was corrected to the following:     Prior to Admission Medications   Prescriptions Last Dose Informant Patient Reported? Taking? OTHER 7/11/2017 at 0200 Self Yes Yes   Sig: Take 500-1,000 mg by mouth every six (6) hours as needed for Pain. Patient stated he takes 'something for tension' that was 500 mg.   esomeprazole (NEXIUM) 20 mg capsule 7/11/2017 at Unknown time Self No Yes   Sig: Take 1 Cap by mouth daily.       Facility-Administered Medications: None          Thank you,  Lynette Sanches CPhT  Medication History Pharmacy Technician

## 2017-07-11 NOTE — ED NOTES
Patient arrives for headaches that began about 3 weeks ago. Reports that the headaches are frontal and feels like a dull throb. Reports having sinus congestion. Patient reports that he has been having episodes of vomiting at least once per day and a few episodes of diarrhea. Reports poor appetite. No distress noted. Will continue to monitor.

## 2017-07-11 NOTE — IP AVS SNAPSHOT
Current Discharge Medication List  
  
START taking these medications Dose & Instructions Dispensing Information Comments Morning Noon Evening Bedtime  
 atorvastatin 40 mg tablet Commonly known as:  LIPITOR Your last dose was: Your next dose is:    
   
   
 Dose:  40 mg Take 1 Tab by mouth nightly. Quantity:  30 Tab Refills:  0  
     
   
   
   
  
 enoxaparin 60 mg/0.6 mL injection Commonly known as:  LOVENOX Your last dose was: Your next dose is:    
   
   
 Dose:  1 mg/kg 60 mg by SubCUTAneous route every twelve (12) hours every twelve (12) hours. Stop using this medication once you INR is 2.0 or greater (Goal INR is 2.0 to 3.0)  Indications: dural sinus thrombosis Quantity:  23 Syringe Refills:  0  
     
   
   
   
  
 ferrous sulfate 325 mg (65 mg iron) tablet Your last dose was: Your next dose is:    
   
   
 Dose:  325 mg Take 1 Tab by mouth daily (with breakfast). Quantity:  30 Tab Refills:  0  
     
   
   
   
  
 ondansetron hcl 4 mg tablet Commonly known as:  ZOFRAN (AS HYDROCHLORIDE) Your last dose was: Your next dose is:    
   
   
 Dose:  4 mg Take 1 Tab by mouth every eight (8) hours as needed for Nausea. Quantity:  30 Tab Refills:  0  
     
   
   
   
  
 warfarin 7.5 mg tablet Commonly known as:  COUMADIN Your last dose was: Your next dose is:    
   
   
 Dose:  7.5 mg Take 1 Tab by mouth once for 1 dose. Quantity:  10 Tab Refills:  0 CONTINUE these medications which have NOT CHANGED Dose & Instructions Dispensing Information Comments Morning Noon Evening Bedtime  
 esomeprazole 20 mg capsule Commonly known as:  NexIUM Your last dose was: Your next dose is:    
   
   
 Dose:  20 mg Take 1 Cap by mouth daily. Quantity:  30 Cap Refills:  6 STOP taking these medications OTHER Where to Get Your Medications Information on where to get these meds will be given to you by the nurse or doctor. ! Ask your nurse or doctor about these medications  
  atorvastatin 40 mg tablet  
 enoxaparin 60 mg/0.6 mL injection  
 ferrous sulfate 325 mg (65 mg iron) tablet  
 ondansetron hcl 4 mg tablet  
 warfarin 7.5 mg tablet

## 2017-07-11 NOTE — PROGRESS NOTES
MRI    Mri screening sheet needs to be completed and signed    Please call [phone] when this is done

## 2017-07-11 NOTE — ED NOTES
Shift report received from [address] [place] [address],[address] (offgoing RN). Report included SBAR, ED results. Patient is off the unit for MRI at this time. Approximate time patient off unit for exam was 1830. Wife is in room awaiting patient's return. Introduced myself to wife at time of bedside report. Pending admission bed placement at this time. Will assess patient upon his return to ED after MRI.

## 2017-07-11 NOTE — IP AVS SNAPSHOT
[address] [facility] 
[phone] Patient: Alexandra Miller MRN: [id number] CNW:[id number] You are allergic to the following Allergen Reactions Peanut Rash Face swelling, scratchy throat, rash Recent Documentation Height Weight BMI Smoking Status 1.778 m 62.4 kg 19.74 kg/m2 Current Every Day Smoker Unresulted Labs Order Current Status FACTOR II  DNA ANALYSIS In process FACTOR V LEIDEN In process Emergency Contacts Name Discharge Info Relation Home Work Mobile [employer] DISCHARGE CAREGIVER [3] Spouse [3] [phone] About your hospitalization You were admitted on:  July 11, 2017 You last received care in the:  [facility] You were discharged on:  July 14, 2017 Unit phone number:  [phone] Why you were hospitalized Your primary diagnosis was:  Not on File Your diagnoses also included:  Subdural Hematoma (Hcc), Headache, Dural Venous Sinus Thrombosis, Iron Deficiency Anemia, Hyperlipidemia Providers Seen During Your Hospitalizations Provider Role Specialty Primary office phone [employer]. Jocy Mcgarry MD Attending Provider Emergency Medicine [phone] Manda Maguire MD Attending Provider Internal Medicine [phone] Your Primary Care Physician (PCP) Primary Care Physician Office Phone Office Fax Michael, [phone] Follow-up Information Follow up With Details Comments Contact Info Delos Kocher, NP On 7/17/2017 You have an appointment scheduled for 10:30AM to have your INR checked. [address] [facility] [address] [phone] Felix Shoemaker MD In 4 weeks Call for appt [address] [facility] 
[phone] Your Appointments  Monday July 17, 2017 10:30 AM EDT  
 Nurse Visit with Mansoor Parker [address] ([address]) [address], [address] [address]  
[phone] Current Discharge Medication List  
  
START taking these medications Dose & Instructions Dispensing Information Comments Morning Noon Evening Bedtime  
 atorvastatin 40 mg tablet Commonly known as:  LIPITOR Your last dose was: Your next dose is:    
   
   
 Dose:  40 mg Take 1 Tab by mouth nightly. Quantity:  30 Tab Refills:  0  
     
   
   
   
  
 enoxaparin 60 mg/0.6 mL injection Commonly known as:  LOVENOX Your last dose was: Your next dose is:    
   
   
 Dose:  1 mg/kg 60 mg by SubCUTAneous route every twelve (12) hours every twelve (12) hours. Stop using this medication once you INR is 2.0 or greater (Goal INR is 2.0 to 3.0)  Indications: dural sinus thrombosis Quantity:  23 Syringe Refills:  0  
     
   
   
   
  
 ferrous sulfate 325 mg (65 mg iron) tablet Your last dose was: Your next dose is:    
   
   
 Dose:  325 mg Take 1 Tab by mouth daily (with breakfast). Quantity:  30 Tab Refills:  0  
     
   
   
   
  
 ondansetron hcl 4 mg tablet Commonly known as:  ZOFRAN (AS HYDROCHLORIDE) Your last dose was: Your next dose is:    
   
   
 Dose:  4 mg Take 1 Tab by mouth every eight (8) hours as needed for Nausea. Quantity:  30 Tab Refills:  0  
     
   
   
   
  
 warfarin 7.5 mg tablet Commonly known as:  COUMADIN Your last dose was: Your next dose is:    
   
   
 Dose:  7.5 mg Take 1 Tab by mouth once for 1 dose. Quantity:  10 Tab Refills:  0 CONTINUE these medications which have NOT CHANGED Dose & Instructions Dispensing Information Comments Morning Noon Evening Bedtime  
 esomeprazole 20 mg capsule Commonly known as:  NexIUM Your last dose was: Your next dose is:    
   
   
 Dose:  20 mg Take 1 Cap by mouth daily. Quantity:  30 Cap Refills:  6 STOP taking these medications OTHER Where to Get Your Medications Information on where to get these meds will be given to you by the nurse or doctor. ! Ask your nurse or doctor about these medications  
  atorvastatin 40 mg tablet  
 enoxaparin 60 mg/0.6 mL injection  
 ferrous sulfate 325 mg (65 mg iron) tablet  
 ondansetron hcl 4 mg tablet  
 warfarin 7.5 mg tablet Discharge Instructions HOSPITALIST DISCHARGE INSTRUCTIONS 
 
NAME: Rafita Murdock :  1980 MRN:  [id number] Date/Time:  2017 4:08 PM 
 
ADMIT DATE: 2017 DISCHARGE DATE: 2017 DISCHARGE DIAGNOSIS: 
Active Problems: 
  Dural venous sinus thrombosis (2017) Iron deficiency anemia (2017) Hyperlipidemia (2017) MEDICATIONS: 
As per medication reconciliation  list 
· It is important that you take the medication exactly as they are prescribed. · Keep your medication in the bottles provided by the pharmacist and keep a list of the medication names, dosages, and times to be taken in your wallet. · Do not take other medications without consulting your doctor. Pain Management: Tylenol as needed for headache. Avoid NSAIDs like ibuprofen as they will increase you bleeding risk while taking warfarin (coumadin). What to do at [facility] Recommended diet:  Regular Diet Recommended activity: Activity as tolerated INR is a a blood test that measures how thin your blood is on coumadin. A normal INR (someone who is not on coumadin) is 1.0. You will know that your INR is therapeutic when it is between 2.0 and 3.0 and this means that your blood is the appropriate thinness. It typically takes about 3 days to see a change in your INR after you coumadin dose is adjusted.  Be cautious as there are many medications that can interact with coumadin. Continue the Lovenox injections until told to stop by your PCP. Lovenox thins your blood in a different way than coumadin. The goal is to continue the lovenox for 24 hours after your INR is therapeutic and then to stop lovenox. If you get very large bruises at the Lovenox injection site then call your PCP or come to the Emergency Department. If you experience any of the following symptoms then please call your primary care physician or return to the emergency room if you cannot get hold of your doctor: 
Fever, chills, nausea, vomiting, diarrhea, change in mentation, falling, bleeding, shortness of breath, chest pain or any other concerning symptoms. Follow Up: 
Dr. Evelyne Sanchez, NP  Next week as scheduled to have you INR checked. Information obtained by : 
I understand that if any problems occur once I am at home I am to contact my physician. I understand and acknowledge receipt of the instructions indicated above. Physician's or R.N.'s Signature                                                                  Date/Time Patient or Representative Signature                                                          Date/Time Discharge Instructions Attachments/References WARFARIN: LONG-TERM USE (ENGLISH) Discharge Orders None [place] Announcement We are excited to announce that we are making your provider's discharge notes available to you in [place]. You will see these notes when they are completed and signed by the physician that discharged you from your recent hospital stay.   If you have any questions or concerns about any information you see in [employer], please call the Health Information Department where you were seen or reach out to your Primary Care Provider for more information about your plan of care. Introducing hospitals & HEALTH SERVICES! Ramila Lepe introduces [employer]entric patient portal. Now you can access parts of your medical record, email your doctor's office, and request medication refills online. 1. In your internet browser, go to [email or web address] 2. Click on the First Time User? Click Here link in the Sign In box. You will see the New Member Sign Up page. 3. Enter your Zentric Access Code exactly as it appears below. You will not need to use this code after youve completed the sign-up process. If you do not sign up before the expiration date, you must request a new code. · Zentric Access Code: [email or web address] Expires: 10/3/2017  4:03 PM 
 
4. Enter the last four digits of your Social Security Number (xxxx) and Date of Birth (mm/dd/yyyy) as indicated and click Submit. You will be taken to the next sign-up page. 5. Create a Zentric ID. This will be your [employer]entric login ID and cannot be changed, so think of one that is secure and easy to remember. 6. Create a Zentric password. You can change your password at any time. 7. Enter your Password Reset Question and Answer. This can be used at a later time if you forget your password. 8. Enter your e-mail address. You will receive e-mail notification when new information is available in [address] 9. Click Sign Up. You can now view and download portions of your medical record. 10. Click the Download Summary menu link to download a portable copy of your medical information. If you have questions, please visit the Frequently Asked Questions section of the Zentric website. Remember, [employer] is NOT to be used for urgent needs. For medical emergencies, dial 911. Now available from your iPhone and Android! General Information Please provide this summary of care documentation to your next provider. Patient Signature:  ____________________________________________________________ Date:  ____________________________________________________________  
  
Rachel Rodriguezncer Provider Signature:  ____________________________________________________________ Date:  ____________________________________________________________ More Information Taking Warfarin Safely: Care Instructions Your Care Instructions Warfarin is a medicine that you take to prevent blood clots. It is often called a blood thinner. Doctors give warfarin (such as Coumadin) to reduce the risk of blood clots. You may be at risk for blood clots if you have atrial fibrillation or deep vein thrombosis. Some other health problems may also put you at risk. Warfarin slows the amount of time it takes for your blood to clot. It can cause bleeding problems. Even if you've been taking warfarin for a while, it's important to know how to take it safely. Foods and other medicines can affect the way warfarin works. Some can make warfarin work too well. This can cause bleeding problems. And some can make it work poorly, so that it does not prevent blood clots very well. You will need regular blood tests to check how long it takes for your blood to form a clot. This test is called a PT or prothrombin time test. The result of the test is called an INR level. Depending on the test results, your doctor or anticoagulation clinic may adjust your dose of warfarin. Follow-up care is a key part of your treatment and safety. Be sure to make and go to all appointments, and call your doctor if you are having problems. It's also a good idea to know your test results and keep a list of the medicines you take. How can you care for yourself at home? Take warfarin safely · Take your warfarin at the same time each day. · If you miss a dose of warfarin, don't take an extra dose to make up for it. Your doctor can tell you exactly what to do so you don't take too much or too little. · Wear medical alert jewelry that lets others know that you take warfarin. You can buy this at most drugstores. · Don't take warfarin if you are pregnant or planning to get pregnant. Talk to your doctor about how you can prevent getting pregnant while you are taking it. · Don't change your dose or stop taking warfarin unless your doctor tells you to. Effects of medicines and food on warfarin · Don't start or stop taking any medicines, vitamins, or natural remedies unless you first talk to your doctor. Many medicines can affect how warfarin works. These include aspirin and other pain relievers, over-the-counter medicines, multivitamins, dietary supplements, and herbal products. · Tell all of your doctors and pharmacists that you take warfarin. Some prescription medicines can affect how warfarin works. · Keep the amount of vitamin K in your diet about the same from day to day. Do not suddenly eat a lot more or a lot less food that is rich in vitamin K than you usually do. Vitamin K affects how warfarin works and how your blood clots. Talk with your doctor before making big changes in your diet. Foods that have a lot of vitamin K include cooked green vegetables, such as: ¨ Kale, spinach, turnip greens, rosario greens, Swiss chard, and mustard greens. ¨ [place] sprouts, broccoli, and asparagus. · Limit your use of alcohol. Avoid bleeding by preventing falls and injuries · Wear slippers or shoes with nonskid soles. · Remove throw rugs and clutter. · Rearrange furniture and electrical cords to keep them out of walking paths. · Keep stairways, porches, and outside walkways well lit. Use night-lights in hallways and bathrooms. · Be extra careful when you work with sharp tools or knives. When should you call for help? Call 911 anytime you think you may need emergency care. For example, call if: 
· You have a sudden, severe headache that is different from past headaches. Call your doctor now or seek immediate medical care if: 
· You have any abnormal bleeding, such as: 
¨ Nosebleeds. ¨ Vaginal bleeding that is different (heavier, more frequent, at a different time of the month) than what you are used to. ¨ Bloody or black stools, or rectal bleeding. ¨ Bloody or pink urine. Watch closely for changes in your health, and be sure to contact your doctor if you have any problems. Where can you learn more? Go to http://carli-aidan.info/. Enter F842 in the search box to learn more about \"Taking Warfarin Safely: Care Instructions. \" Current as of: November 15, 2016 Content Version: 11.3 © 0337-3866 [employer], [employer]. Care instructions adapted under license by [employer] (which disclaims liability or warranty for this information). If you have questions about a medical condition or this instruction, always ask your healthcare professional. [address] any warranty or liability for your use of this information.

## 2017-07-11 NOTE — ED NOTES
This RN called MRI at this time inquiring when patient will be taken for imaging. MRI states \"patient will not be taken until 630 due to being backed up with other patients. \" ER MD Deborah Martell and MD John notified.

## 2017-07-12 PROBLEM — G08 TRANSVERSE SINUS THROMBOSIS: Status: ACTIVE | Noted: 2017-07-12

## 2017-07-12 LAB
ALBUMIN SERPL BCP-MCNC: 3.6 G/DL (ref 3.5–5)
ALBUMIN/GLOB SERPL: 1.1 {RATIO} (ref 1.1–2.2)
ALP SERPL-CCNC: 63 U/L (ref 45–117)
ALT SERPL-CCNC: 42 U/L (ref 12–78)
ANION GAP BLD CALC-SCNC: 5 MMOL/L (ref 5–15)
AST SERPL W P-5'-P-CCNC: 60 U/L (ref 15–37)
BILIRUB SERPL-MCNC: 1.2 MG/DL (ref 0.2–1)
BUN SERPL-MCNC: 5 MG/DL (ref 6–20)
BUN/CREAT SERPL: 7 (ref 12–20)
CALCIUM SERPL-MCNC: 8.9 MG/DL (ref 8.5–10.1)
CHLORIDE SERPL-SCNC: 98 MMOL/L (ref 97–108)
CHOLEST SERPL-MCNC: 253 MG/DL
CO2 SERPL-SCNC: 31 MMOL/L (ref 21–32)
CREAT SERPL-MCNC: 0.72 MG/DL (ref 0.7–1.3)
ERYTHROCYTE [DISTWIDTH] IN BLOOD BY AUTOMATED COUNT: 22.2 % (ref 11.5–14.5)
EST. AVERAGE GLUCOSE BLD GHB EST-MCNC: NORMAL MG/DL
GLOBULIN SER CALC-MCNC: 3.4 G/DL (ref 2–4)
GLUCOSE SERPL-MCNC: 80 MG/DL (ref 65–100)
HBA1C MFR BLD: 4.6 % (ref 4.2–6.3)
HCT VFR BLD AUTO: 29.5 % (ref 36.6–50.3)
HCYS SERPL-SCNC: 17.9 UMOL/L (ref 3.7–13.9)
HDLC SERPL-MCNC: 109 MG/DL
HDLC SERPL: 2.3 {RATIO} (ref 0–5)
HGB BLD-MCNC: 9.7 G/DL (ref 12.1–17)
LDLC SERPL CALC-MCNC: 118.8 MG/DL (ref 0–100)
LIPID PROFILE,FLP: ABNORMAL
MCH RBC QN AUTO: 26.3 PG (ref 26–34)
MCHC RBC AUTO-ENTMCNC: 32.9 G/DL (ref 30–36.5)
MCV RBC AUTO: 79.9 FL (ref 80–99)
PLATELET # BLD AUTO: 200 K/UL (ref 150–400)
POTASSIUM SERPL-SCNC: 3.6 MMOL/L (ref 3.5–5.1)
PROT SERPL-MCNC: 7 G/DL (ref 6.4–8.2)
RBC # BLD AUTO: 3.69 M/UL (ref 4.1–5.7)
SODIUM SERPL-SCNC: 134 MMOL/L (ref 136–145)
TRIGL SERPL-MCNC: 126 MG/DL (ref ?–150)
VLDLC SERPL CALC-MCNC: 25.2 MG/DL
WBC # BLD AUTO: 5.2 K/UL (ref 4.1–11.1)

## 2017-07-12 PROCEDURE — 74011000250 HC RX REV CODE- 250: Performed by: INTERNAL MEDICINE

## 2017-07-12 PROCEDURE — 81241 F5 GENE: CPT | Performed by: PSYCHIATRY & NEUROLOGY

## 2017-07-12 PROCEDURE — 85210 CLOT FACTOR II PROTHROM SPEC: CPT | Performed by: PSYCHIATRY & NEUROLOGY

## 2017-07-12 PROCEDURE — 74011250636 HC RX REV CODE- 250/636: Performed by: HOSPITALIST

## 2017-07-12 PROCEDURE — 86147 CARDIOLIPIN ANTIBODY EA IG: CPT | Performed by: PSYCHIATRY & NEUROLOGY

## 2017-07-12 PROCEDURE — 74011250637 HC RX REV CODE- 250/637: Performed by: INTERNAL MEDICINE

## 2017-07-12 PROCEDURE — 86146 BETA-2 GLYCOPROTEIN ANTIBODY: CPT | Performed by: PSYCHIATRY & NEUROLOGY

## 2017-07-12 PROCEDURE — 80053 COMPREHEN METABOLIC PANEL: CPT | Performed by: INTERNAL MEDICINE

## 2017-07-12 PROCEDURE — 85303 CLOT INHIBIT PROT C ACTIVITY: CPT | Performed by: PSYCHIATRY & NEUROLOGY

## 2017-07-12 PROCEDURE — 80061 LIPID PANEL: CPT | Performed by: INTERNAL MEDICINE

## 2017-07-12 PROCEDURE — 36415 COLL VENOUS BLD VENIPUNCTURE: CPT | Performed by: PSYCHIATRY & NEUROLOGY

## 2017-07-12 PROCEDURE — 83090 ASSAY OF HOMOCYSTEINE: CPT | Performed by: PSYCHIATRY & NEUROLOGY

## 2017-07-12 PROCEDURE — 83036 HEMOGLOBIN GLYCOSYLATED A1C: CPT | Performed by: INTERNAL MEDICINE

## 2017-07-12 PROCEDURE — 65660000000 HC RM CCU STEPDOWN

## 2017-07-12 PROCEDURE — 74011250637 HC RX REV CODE- 250/637: Performed by: HOSPITALIST

## 2017-07-12 PROCEDURE — 85613 RUSSELL VIPER VENOM DILUTED: CPT | Performed by: PSYCHIATRY & NEUROLOGY

## 2017-07-12 PROCEDURE — 85027 COMPLETE CBC AUTOMATED: CPT | Performed by: INTERNAL MEDICINE

## 2017-07-12 PROCEDURE — 74011250636 HC RX REV CODE- 250/636: Performed by: INTERNAL MEDICINE

## 2017-07-12 PROCEDURE — 85300 ANTITHROMBIN III ACTIVITY: CPT | Performed by: PSYCHIATRY & NEUROLOGY

## 2017-07-12 PROCEDURE — 85306 CLOT INHIBIT PROT S FREE: CPT | Performed by: PSYCHIATRY & NEUROLOGY

## 2017-07-12 RX ORDER — PANTOPRAZOLE SODIUM 40 MG/1
40 TABLET, DELAYED RELEASE ORAL
Status: DISCONTINUED | OUTPATIENT
Start: 2017-07-12 | End: 2017-07-14 | Stop reason: HOSPADM

## 2017-07-12 RX ORDER — LANOLIN ALCOHOL/MO/W.PET/CERES
3 CREAM (GRAM) TOPICAL
Status: DISCONTINUED | OUTPATIENT
Start: 2017-07-12 | End: 2017-07-13

## 2017-07-12 RX ORDER — MORPHINE SULFATE 2 MG/ML
2 INJECTION, SOLUTION INTRAMUSCULAR; INTRAVENOUS ONCE
Status: COMPLETED | OUTPATIENT
Start: 2017-07-12 | End: 2017-07-12

## 2017-07-12 RX ORDER — ATORVASTATIN CALCIUM 40 MG/1
40 TABLET, FILM COATED ORAL
Status: DISCONTINUED | OUTPATIENT
Start: 2017-07-12 | End: 2017-07-14 | Stop reason: HOSPADM

## 2017-07-12 RX ADMIN — FOLIC ACID: 5 INJECTION, SOLUTION INTRAMUSCULAR; INTRAVENOUS; SUBCUTANEOUS at 09:11

## 2017-07-12 RX ADMIN — ATORVASTATIN CALCIUM 40 MG: 40 TABLET, FILM COATED ORAL at 21:43

## 2017-07-12 RX ADMIN — PANTOPRAZOLE SODIUM 40 MG: 40 TABLET, DELAYED RELEASE ORAL at 16:41

## 2017-07-12 RX ADMIN — MELATONIN 3 MG ORAL TABLET 3 MG: 3 TABLET ORAL at 23:35

## 2017-07-12 RX ADMIN — MORPHINE SULFATE 2 MG: 2 INJECTION, SOLUTION INTRAMUSCULAR; INTRAVENOUS at 00:46

## 2017-07-12 RX ADMIN — ACETAMINOPHEN 650 MG: 325 TABLET, FILM COATED ORAL at 04:38

## 2017-07-12 RX ADMIN — ACETAMINOPHEN 650 MG: 325 TABLET, FILM COATED ORAL at 17:05

## 2017-07-12 RX ADMIN — ONDANSETRON 4 MG: 2 INJECTION INTRAMUSCULAR; INTRAVENOUS at 20:18

## 2017-07-12 RX ADMIN — ENOXAPARIN SODIUM 60 MG: 60 INJECTION SUBCUTANEOUS at 09:11

## 2017-07-12 RX ADMIN — ENOXAPARIN SODIUM 60 MG: 60 INJECTION SUBCUTANEOUS at 21:46

## 2017-07-12 NOTE — PROGRESS NOTES
Neurosurgery  Dural sinus thrombosis, thrombosis of a superficial cortical vein left  tentorium. There is thrombus in the left transverse sinus    No reading of acute hemorrhage.     Would consult neurology for management of dural sinus thrombosis    Am available if needed

## 2017-07-12 NOTE — PROGRESS NOTES
19:30  Bedside shift change report given to Lupe Calderon RN (oncoming nurse) by Goyo Castaneda RN (offgoing nurse). Report included the following information SBAR, Kardex, MAR and Recent Results . 23:30  Pt stated he is having a very difficult time sleeping and is requesting sleep aid. Order received from Dr. Farida Silva for prn Melatonin. 04:50  Pt awake in bed. Appears irritable. Pt stated he has not been able to sleep at all overnight and the Melatonin did not help. Pt stated he is feeling a little nauseated this morning, and headache is returning. Pt then stated this is why he has 4-5 beers at night to sleep. Asked pt if his current symptoms are the same at home before he drinks at night, and pt stated that they were. Gave prn Ativan based on CIWA score of 8.    05:40  Pt reports complete resolution of symptoms. Pt is resting quietly in bed. Will continue to monitor. Lupe Calderon RN      07:55  Bedside shift change report given to Bert Jean (oncoming nurse) by Lupe Calderon RN (offgoing nurse). Report included the following information SBAR, Kardex, MAR and Recent Results.

## 2017-07-12 NOTE — PROGRESS NOTES
TRANSFER - IN REPORT:    Verbal report received from Cornelio Gillette RN(name) on Alexandra Miller  being received from ED(unit) for routine progression of care      Report consisted of patients Situation, Background, Assessment and   Recommendations(SBAR). Information from the following report(s) SBAR, Kardex, [facility] and Recent Results was reviewed with the receiving nurse. Opportunity for questions and clarification was provided. Assessment completed upon patients arrival to unit and care assumed. Primary Nurse Viet Rosa RN and Dusty Sy RN performed a dual skin assessment on this patient No impairment noted  Major score is 25.    00:25  Notified Dr. Waldo Lang that pt's headache now 8/10. Pt stated the only thing that helped was the IV morphine. Order received for another one time dose of IV morphine, this time only 2mg. To nesha MATTSON if headache worsens. Viet Rosa RN    07:45  Bedside shift change report given to Maritza Fuller RN (oncoming nurse) by Viet Rosa RN (offgoing nurse). Report included the following information SBAR, Kardex, MAR and Recent Results.

## 2017-07-12 NOTE — PROGRESS NOTES
Visited by Albaro Caal Partner on 7/12/17.     Freada Kawasaki, Lead [facility] Paging Service  [phone]

## 2017-07-12 NOTE — PROGRESS NOTES
Hospitalist Progress Note    NAME: Wade Willett   :  1980   MRN:  [id number]       Assessment / Plan:    Dural Sinus Thrombosis: (thrombosis of a superficial cortical vein left  Tentorium and thrombus in the left transverse sinus)  Severe Headache  -continue treatment dose Lovenox  -await Neurology impression  -Seizure precautions  -continue prn Fioricet  -no indication for dexamethasone or acetazolamide at this time     Anemia: Micorcytic, patient complains of intermittent hemorrhoidal bleeding (no cirrhosis on review of 2017 CT A/P)  -monitor for bleeding while on Lovenox  -check iron panel, ferritin, B12 and Folate    Alcohol abuse  Hx of fatty liver (patient not overweight)  Elevated AST 97  -4-5 beers daily  -Denies history of DT's  -Banana bag daily  -CIWA with prn IV ativan  -cessation encouraged     Tobacco abuse  -smokes 2-3 cigarettes daily  -does not want nicotine patch.     Hypercholesterolemia  -start lipitor     Code Status: full  Surrogate Decision Maker: wife  DVT Prophylaxis: started therapeutic lovenox for dural sinus thrombosis as in addendum. GI Prophylaxis: not indicated  Baseline: independent    Body mass index is 19.74 kg/(m^2). Subjective:     Chief Complaint / Reason for Physician Visit: follow-up dural sinus thrombosis  Patient states that he feel better this am  -has had HA for last month, worsening over last 2 weeks as well as vomiting, dizziness and lightheadedness    Review of Systems:  Symptom Y/N Comments  Symptom Y/N Comments   Fever/Chills    Chest Pain n    Poor Appetite    Edema     Cough    Abdominal Pain n    Sputum    Joint Pain     SOB/ALEX n   Pruritis/Rash     Nausea/vomit n Improved at this time  Tolerating PT/OT     Diarrhea y A couple of weeks ago  Tolerating Diet     Constipation    Other       Could NOT obtain due to:      Objective:     VITALS:   Last 24hrs VS reviewed since prior progress note.  Most recent are:  Patient Vitals for the past 24 hrs:   Temp Pulse Resp BP SpO2   07/12/17 0755 99 °F (37.2 °C) 74 16 135/87 -   07/12/17 0416 98.9 °F (37.2 °C) 72 18 136/89 100 %   07/12/17 0053 - 75 17 - -   07/11/17 2353 99 °F (37.2 °C) 70 16 (!) 151/98 99 %   07/11/17 2315 - 67 15 130/77 100 %   07/11/17 2300 - 72 17 135/85 99 %   07/11/17 2246 - 84 18 (!) 136/94 98 %   07/11/17 2200 - 75 21 134/90 98 %   07/11/17 2145 - 77 22 132/86 100 %   07/11/17 2100 - 77 19 (!) 140/97 100 %   07/11/17 2044 98.6 °F (37 °C) 78 12 (!) 145/106 95 %   07/11/17 2000 - - - (!) 140/96 100 %   07/11/17 1800 - - - 138/90 98 %   07/11/17 1730 - - - 142/88 99 %   07/11/17 1706 - - - (!) 147/93 99 %   07/11/17 1700 - - - - 98 %   07/11/17 1630 - - - - 99 %   07/11/17 1515 - - - - 99 %   07/11/17 1130 - - - 148/78 98 %   07/11/17 1100 - - - 131/81 99 %   07/11/17 1004 - 88 - (!) 141/92 -   07/11/17 1003 - 78 - (!) 127/93 -   07/11/17 1001 - 73 - 128/82 -       Intake/Output Summary (Last 24 hours) at 07/12/17 0902  Last data filed at 07/12/17 0442   Gross per 24 hour   Intake                0 ml   Output              200 ml   Net             -200 ml        PHYSICAL EXAM:  General: WD, WN. Alert, cooperative, no acute distress    EENT:  EOMI. Anicteric sclerae. MMM  Resp:  CTA bilaterally, no wheezing or rales. No accessory muscle use  CV:  Regular  rhythm,  No edema  GI:  Soft, Non distended, Non tender.  +Bowel sounds  Neurologic:  Alert and oriented X 3, normal speech,   Psych:   Good insight. Not anxious nor agitated  Skin:  No rashes.   No jaundice    Reviewed most current lab test results and cultures  YES  Reviewed most current radiology test results   YES  Review and summation of old records today    NO  Reviewed patient's current orders and MAR    YES  PMH/SH reviewed - no change compared to H&P  ________________________________________________________________________  Care Plan discussed with:    Comments   Patient x    Family  x    RN     Care Manager     Consultant  x  Pratima                     Multidiciplinary team rounds were held today with , nursing, pharmacist and clinical coordinator. Patient's plan of care was discussed; medications were reviewed and discharge planning was addressed. ________________________________________________________________________  Total NON critical care TIME:  30   Minutes    Total CRITICAL CARE TIME Spent:   Minutes non procedure based      Comments   >50% of visit spent in counseling and coordination of care     ________________________________________________________________________  Yany Gordon MD     Procedures: see electronic medical records for all procedures/Xrays and details which were not copied into this note but were reviewed prior to creation of Plan. LABS:  I reviewed today's most current labs and imaging studies.   Pertinent labs include:  Recent Labs      07/12/17   0434  07/11/17   1003   WBC  5.2  5.9   HGB  9.7*  11.0*   HCT  29.5*  33.2*   PLT  200  234     Recent Labs      07/12/17   0434  07/11/17   1003   NA  134*  136   K  3.6  4.4   CL  98  97   CO2  31  29   GLU  80  87   BUN  5*  6   CREA  0.72  0.80   CA  8.9  9.2   ALB  3.6  4.3   TBILI  1.2*  0.5   SGOT  60*  97*   ALT  42  51   INR   --   0.9       Signed: Yany Gordon MD

## 2017-07-12 NOTE — PROGRESS NOTES
Noted that left IJ clot and transverse sinus dural thrombus. Neurosurgery has consulted with neurology for intervention. Neurology has not yet seen pt. Will await their recommendations prior to start of therapy services.

## 2017-07-12 NOTE — ED NOTES
TRANSFER - OUT REPORT:    Verbal report given to Karlos Bravo (name) on [facility]  being transferred to PCU 2(unit) for routine progression of care       Report consisted of patients Situation, Background, Assessment and   Recommendations(SBAR). Information from the following report(s) SBAR, ED Summary, MAR, Med Rec Status and Cardiac Rhythm NSR was reviewed with the receiving nurse. Lines:   Peripheral IV 07/11/17 Right Antecubital (Active)   Site Assessment Clean, dry, & intact 7/11/2017 10:11 AM   Phlebitis Assessment 0 7/11/2017 10:11 AM   Infiltration Assessment 0 7/11/2017 10:11 AM   Dressing Status Clean, dry, & intact 7/11/2017 10:11 AM        Opportunity for questions and clarification was provided.       Patient transported with:   Monitor  Registered Nurse

## 2017-07-13 ENCOUNTER — APPOINTMENT (OUTPATIENT)
Dept: ULTRASOUND IMAGING | Age: 37
DRG: 092 | End: 2017-07-13
Attending: PSYCHIATRY & NEUROLOGY
Payer: COMMERCIAL

## 2017-07-13 LAB
ALBUMIN SERPL BCP-MCNC: 3.9 G/DL (ref 3.5–5)
ALBUMIN/GLOB SERPL: 1 {RATIO} (ref 1.1–2.2)
ALP SERPL-CCNC: 64 U/L (ref 45–117)
ALT SERPL-CCNC: 66 U/L (ref 12–78)
ANION GAP BLD CALC-SCNC: 9 MMOL/L (ref 5–15)
AST SERPL W P-5'-P-CCNC: 144 U/L (ref 15–37)
AT III PPP CHRO-ACNC: 63 % (ref 75–135)
BASOPHILS # BLD AUTO: 0 K/UL (ref 0–0.1)
BASOPHILS # BLD: 1 % (ref 0–1)
BILIRUB SERPL-MCNC: 0.7 MG/DL (ref 0.2–1)
BUN SERPL-MCNC: 3 MG/DL (ref 6–20)
BUN/CREAT SERPL: 4 (ref 12–20)
CALCIUM SERPL-MCNC: 9.1 MG/DL (ref 8.5–10.1)
CARDIOLIPIN IGG SER IA-ACNC: <9 GPL U/ML (ref 0–14)
CARDIOLIPIN IGM SER IA-ACNC: <9 MPL U/ML (ref 0–12)
CHLORIDE SERPL-SCNC: 101 MMOL/L (ref 97–108)
CO2 SERPL-SCNC: 25 MMOL/L (ref 21–32)
CREAT SERPL-MCNC: 0.7 MG/DL (ref 0.7–1.3)
DIFFERENTIAL METHOD BLD: ABNORMAL
EOSINOPHIL # BLD: 0.1 K/UL (ref 0–0.4)
EOSINOPHIL NFR BLD: 2 % (ref 0–7)
ERYTHROCYTE [DISTWIDTH] IN BLOOD BY AUTOMATED COUNT: 22.2 % (ref 11.5–14.5)
FERRITIN SERPL-MCNC: 46 NG/ML (ref 26–388)
FOLATE SERPL-MCNC: 19.6 NG/ML (ref 5–21)
GLOBULIN SER CALC-MCNC: 3.8 G/DL (ref 2–4)
GLUCOSE SERPL-MCNC: 104 MG/DL (ref 65–100)
HCT VFR BLD AUTO: 30.7 % (ref 36.6–50.3)
HGB BLD-MCNC: 10.1 G/DL (ref 12.1–17)
IRON SATN MFR SERPL: 8 % (ref 20–50)
IRON SERPL-MCNC: 28 UG/DL (ref 35–150)
LA PPP-IMP: NORMAL
LYMPHOCYTES # BLD AUTO: 36 % (ref 12–49)
LYMPHOCYTES # BLD: 1.4 K/UL (ref 0.8–3.5)
MCH RBC QN AUTO: 26.2 PG (ref 26–34)
MCHC RBC AUTO-ENTMCNC: 32.9 G/DL (ref 30–36.5)
MCV RBC AUTO: 79.7 FL (ref 80–99)
MONOCYTES # BLD: 0.4 K/UL (ref 0–1)
MONOCYTES NFR BLD AUTO: 9 % (ref 5–13)
NEUTS SEG # BLD: 2.1 K/UL (ref 1.8–8)
NEUTS SEG NFR BLD AUTO: 52 % (ref 32–75)
PLATELET # BLD AUTO: 196 K/UL (ref 150–400)
POTASSIUM SERPL-SCNC: 3.8 MMOL/L (ref 3.5–5.1)
PROT C PPP-ACNC: 114 % (ref 73–180)
PROT S ACT/NOR PPP: 68 % (ref 63–140)
PROT SERPL-MCNC: 7.7 G/DL (ref 6.4–8.2)
PROTHROM ACT/NOR PPP: 125 % (ref 50–154)
RBC # BLD AUTO: 3.85 M/UL (ref 4.1–5.7)
RBC MORPH BLD: ABNORMAL
RBC MORPH BLD: ABNORMAL
SCREEN APTT: 46.3 SEC (ref 0–51.9)
SCREEN DRVVT: 35.8 SEC (ref 0–47)
SODIUM SERPL-SCNC: 135 MMOL/L (ref 136–145)
TIBC SERPL-MCNC: 370 UG/DL (ref 250–450)
VIT B12 SERPL-MCNC: 787 PG/ML (ref 211–911)
WBC # BLD AUTO: 4 K/UL (ref 4.1–11.1)

## 2017-07-13 PROCEDURE — G8980 MOBILITY D/C STATUS: HCPCS

## 2017-07-13 PROCEDURE — 85025 COMPLETE CBC W/AUTO DIFF WBC: CPT

## 2017-07-13 PROCEDURE — 97116 GAIT TRAINING THERAPY: CPT

## 2017-07-13 PROCEDURE — 65660000000 HC RM CCU STEPDOWN

## 2017-07-13 PROCEDURE — 82728 ASSAY OF FERRITIN: CPT

## 2017-07-13 PROCEDURE — G8988 SELF CARE GOAL STATUS: HCPCS | Performed by: OCCUPATIONAL THERAPIST

## 2017-07-13 PROCEDURE — 82746 ASSAY OF FOLIC ACID SERUM: CPT

## 2017-07-13 PROCEDURE — 74011250637 HC RX REV CODE- 250/637: Performed by: INTERNAL MEDICINE

## 2017-07-13 PROCEDURE — 36415 COLL VENOUS BLD VENIPUNCTURE: CPT

## 2017-07-13 PROCEDURE — 83540 ASSAY OF IRON: CPT

## 2017-07-13 PROCEDURE — 93970 EXTREMITY STUDY: CPT

## 2017-07-13 PROCEDURE — 97161 PT EVAL LOW COMPLEX 20 MIN: CPT

## 2017-07-13 PROCEDURE — 97165 OT EVAL LOW COMPLEX 30 MIN: CPT | Performed by: OCCUPATIONAL THERAPIST

## 2017-07-13 PROCEDURE — G8979 MOBILITY GOAL STATUS: HCPCS

## 2017-07-13 PROCEDURE — G8987 SELF CARE CURRENT STATUS: HCPCS | Performed by: OCCUPATIONAL THERAPIST

## 2017-07-13 PROCEDURE — G8989 SELF CARE D/C STATUS: HCPCS | Performed by: OCCUPATIONAL THERAPIST

## 2017-07-13 PROCEDURE — 82607 VITAMIN B-12: CPT

## 2017-07-13 PROCEDURE — 80053 COMPREHEN METABOLIC PANEL: CPT

## 2017-07-13 PROCEDURE — 74011250636 HC RX REV CODE- 250/636: Performed by: INTERNAL MEDICINE

## 2017-07-13 PROCEDURE — G8978 MOBILITY CURRENT STATUS: HCPCS

## 2017-07-13 RX ORDER — LANOLIN ALCOHOL/MO/W.PET/CERES
1 CREAM (GRAM) TOPICAL
Status: DISCONTINUED | OUTPATIENT
Start: 2017-07-13 | End: 2017-07-14 | Stop reason: HOSPADM

## 2017-07-13 RX ORDER — ZOLPIDEM TARTRATE 5 MG/1
5 TABLET ORAL
Status: DISCONTINUED | OUTPATIENT
Start: 2017-07-13 | End: 2017-07-14

## 2017-07-13 RX ADMIN — PANTOPRAZOLE SODIUM 40 MG: 40 TABLET, DELAYED RELEASE ORAL at 08:57

## 2017-07-13 RX ADMIN — BUTALBITAL, ACETAMINOPHEN AND CAFFEINE 1 TABLET: 50; 325; 40 TABLET ORAL at 08:57

## 2017-07-13 RX ADMIN — ONDANSETRON 4 MG: 2 INJECTION INTRAMUSCULAR; INTRAVENOUS at 09:37

## 2017-07-13 RX ADMIN — LORAZEPAM 1 MG: 2 INJECTION INTRAMUSCULAR; INTRAVENOUS at 04:56

## 2017-07-13 RX ADMIN — FERROUS SULFATE TAB 325 MG (65 MG ELEMENTAL FE) 325 MG: 325 (65 FE) TAB at 08:57

## 2017-07-13 RX ADMIN — BUTALBITAL, ACETAMINOPHEN AND CAFFEINE 1 TABLET: 50; 325; 40 TABLET ORAL at 15:48

## 2017-07-13 RX ADMIN — ENOXAPARIN SODIUM 60 MG: 60 INJECTION SUBCUTANEOUS at 09:36

## 2017-07-13 RX ADMIN — ENOXAPARIN SODIUM 60 MG: 60 INJECTION SUBCUTANEOUS at 22:41

## 2017-07-13 RX ADMIN — ZOLPIDEM TARTRATE 5 MG: 5 TABLET ORAL at 22:41

## 2017-07-13 RX ADMIN — FLUTICASONE PROPIONATE 2 SPRAY: 50 SPRAY, METERED NASAL at 19:35

## 2017-07-13 RX ADMIN — WARFARIN SODIUM 7.5 MG: 2.5 TABLET ORAL at 18:43

## 2017-07-13 RX ADMIN — ATORVASTATIN CALCIUM 40 MG: 40 TABLET, FILM COATED ORAL at 22:41

## 2017-07-13 NOTE — CONSULTS
[address]   [address] [place] [address]       Name:  Zulema Mena   MR#:  [id number]   :  1980   Account #:  [de-identified]    Date of Consultation:  2017   Date of Adm:  2017       REFERRING PHYSICIAN: Dr. Welsh Patron: Venous sinus thrombosis. HISTORY OF PRESENT ILLNESS: The patient is a 55-year-old   gentleman who was found to have a venous sinus thrombosis. The   patient has been started on Lovenox. He is going to be transitioned to   Coumadin. The patient has been seen by Neurology. We were asked   to see him for possible hypercoagulable state. The patient reports that   he is feeling much better. His headache is improved and we were   asked to see him for further evaluation. PAST MEDICAL HISTORY: Significant for hemorrhoid, high   cholesterol, hypertension, pancreatitis. ALLERGIES: PEANUTS. MEDICATIONS: Currently on:   1. Lipitor. 2. Lovenox 60 mg q.12 hours. 3. Iron. 4. Flonase. 5. Protonix. 6. Coumadin is being started today. REVIEW OF SYSTEMS: Twelve-point review of systems done and   negative except for as above. FAMILY HISTORY: Mother had breast cancer. SOCIAL HISTORY: He is a smoker. He drinks alcohol, about 6 beers a   day. PHYSICAL EXAMINATION:   VITAL SIGNS: Temperature 99.0, pulse 68, blood pressure 137/91,   respirations 18, saturating 98% on room air. GENERAL: Lying in bed in no acute distress. HEENT: Normocephalic, atraumatic. Extraocular muscles intact. Oropharynx clear without lesions. NECK: Supple. No cervical or supraclavicular lymphadenopathy   appreciated. CARDIOVASCULAR: Regular rate and rhythm. LUNGS: Clear to auscultation bilaterally. ABDOMEN: Normoactive bowel sounds. Soft, nontender,   nondistended. No hepatosplenomegaly. EXTREMITIES: No clubbing, cyanosis, or edema.    NEUROLOGIC: Nonfocal.     LABORATORY VALUES: White blood cell count 4.0, hemoglobin 10.1,   platelets 633. Chemistry: Sodium 135, potassium 3.8, BUN 3,   creatinine 0.7, total bilirubin 0.7, ALT 66, , alkaline   phosphatase 64. IMPRESSION AND PLAN: The patient is a 46-year-old gentleman with   a venous sinus thrombosis. He is currently on anticoagulation. He is on   Lovenox which is going to be transitioned over to Coumadin. I agree   with that. Hypercoagulable workup has been sent, including antithrombin   III, lupus anticoagulant, factor V Leiden, prothrombin gene mutation,   protein C, protein S. We will follow along and see if any of that comes   back but needs attention. Overall, he is feeling much better. We will   see what the hypercoagulable workup shows and make further   recommendations as needed. Neurology is on the case as well.          MD MIKE Urban / AMY   D:  07/13/2017   10:52   T:  07/13/2017   14:15   Job #:  [id number]

## 2017-07-13 NOTE — PROGRESS NOTES
Pharmacy Dosing of Warfarin For Dural Sinus Thrombosis: (thrombosis of a superficial cortical vein left Tentorium and thrombus in the left transverse sinus)    Date         INR     Dose  7/11  0.9 0  7/13           None     7.5 mg    Average Daily Warfarin Dose: new start  Concurrent anticoagulants: Lovenox 60 mg BID  Major Interacting Medications (Dose/Frequency): patient was on Banana bag that had Vit K and also on PRN Fiorecet. Both drugs decrease the effect of warfarin. Platelet Inhibitors (Dose/Frequency): none    Recent Labs      07/13/17   0444  07/12/17   0434  07/11/17   1003   INR   --    --   0.9   APTT   --    --   23.8   HGB  10.1*  9.7*  11.0*   PLT  196  200  234   ALB  3.9  3.6  4.3   SGOT  144*  60*  97*   ALT  66  42  51   TBILI  0.7  1.2*  0.5   CREA  0.70  0.72  0.80   BUN  3*  5*  6        Child Carranza Score, if applicable: na    Warfarin Sensitivity Factors Present: none      Impression/Plan: Patient has been on Lovenox treatment dose and today to start warfarin for Dural Sinus Thrombosis. Since the patient is young and was on IV fluid with Vit K in it, will start with Warfarin 7.5 mg for tonight. PT/INR with AM lab  Continue with the Lovenox until the INR is >2 X2 consecutive levels       Pharmacy will follow daily and adjust the dose as appropriate.     Thanks for the consult    Ann Marie Lugo PHARMD

## 2017-07-13 NOTE — PROGRESS NOTES
SPEECH THERAPY SCREENING:  SERVICES ARE NOT INDICATED AT THIS TIME    [facility] therapy based on results obtained during the nursing admission assessment. The patients chart was reviewed and the patient is not appropriate for a skilled therapy evaluation at this time. Please consult speech therapy if any therapy needs arise. Thank you.     Christiano Johns, SLP

## 2017-07-13 NOTE — PROGRESS NOTES
Bedside and Verbal shift change report given to Julien [address] (oncoming nurse) by Gerrie Soulier (offgoing nurse). Report included the following information SBAR, Kardex, Intake/Output, MAR and Recent Results.

## 2017-07-13 NOTE — PROGRESS NOTES
{[facility] BEDSIDE_VERBAL_RECORDED_WRITTEN:29777::\"Bedside\"} shift change report given to [place] Lisa (oncoming nurse) by *** (offgoing nurse). Report included the following information {SBAR REPORTS ECKM:18791}.
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
## 2017-07-13 NOTE — PROGRESS NOTES
physical Therapy EVALUATION/DISCHARGE  Patient: Alexandra Miller (28 y.o. male)  Date: 7/13/2017  Primary Diagnosis: Headache  Subdural hematoma (HCC)        Precautions: fall     ASSESSMENT :Based on the objective data described below, the patient presents at his baseline, independent/safe with all mobility including stairs without device. Pt scored 56/56 on Cifuentes balance testing. Pt is safe from mobility standpoint for discharge per MD.  Based on the objective data described below, the patient presents     Skilled physical therapy is not indicated at this time. PLAN :  Discharge Recommendations: None  Further Equipment Recommendations for Discharge: none     SUBJECTIVE:   Patient stated I feel better.     OBJECTIVE DATA SUMMARY:   HISTORY:    Past Medical History:   Diagnosis Date    Bleeding hemorrhoid     Elevated cholesterol     Hypertension     Pancreatitis      Past Surgical History:   Procedure Laterality Date    HX GI      HX HEMORRHOIDECTOMY      2013     Prior Level of Function/Home Situation: Independent, employed, consumes a 6 pack of beer daily  Personal factors and/or comorbidities impacting plan of care:     Home Situation  Home Environment: Private residence  # Steps to Enter: 6  Rails to Enter: Yes  Hand Rails : Bilateral  One/Two Story Residence: One story ([facility] on hill)  Living Alone: No  Support Systems: Family member(s)  Patient Expects to be Discharged to[de-identified] Private residence  Current DME Used/Available at Home: Blood pressure cuff  Tub or Shower Type: Shower    EXAMINATION/PRESENTATION/DECISION MAKING:   Critical Behavior:  Neurologic State: Alert  Orientation Level: Oriented X4  Cognition: Appropriate decision making, Appropriate for age attention/concentration, Appropriate safety awareness  Safety/Judgement: Awareness of environment, Fall prevention, Home safety  Hearing:   Auditory  Auditory Impairment: None    Range Of Motion:  AROM: Within functional limits           PROM: Within functional limits           Strength:    Strength: Within functional limits                    Tone & Sensation:   Tone: Normal              Sensation: Impaired (reports occasional numbness/pain in fingers/toes)               Coordination:  Coordination: Within functional limits  Vision:   Tracking: Able to track stimulus in all quadrants w/o difficulty  Corrective Lenses: Glasses  Functional Mobility:  Bed Mobility:  Rolling: Independent  Supine to Sit: Independent  Sit to Supine: Independent  Scooting: Independent  Transfers:  Sit to Stand: Independent  Stand to Sit: Independent        Bed to Chair: Independent              Balance:   Sitting: Intact  Standing: Intact  Ambulation/Gait Training:             Independent/safe   Stairs:      up/down 8 steps, step over step,  no rail             Functional Measure:  Cifuentes Balance Test:    Sitting to Standin  Standing Unsupported: 4  Sitting with Back Unsupported: 4  Standing to Sittin  Transfers: 4  Standing Unsupported with Eyes Closed: 4  Standing Unsupported with Feet Together: 4  Reach Forward with Outstretched Arm: 4   Object: 4  Turn to Look Over Shoulders: 4  Turn 360 Degrees: 4  Alternate Foot on Step/Stool: 4  Standing Unsupported One Foot in Front: 4  Stand on One Le  Total: 56         56=Maximum possible score;   0-20=High fall risk  21-40=Moderate fall risk   41-56=Low fall risk     Cifuentes Balance Test and G-code impairment scale:  Percentage of Impairment CH    0%   CI    1-19% CJ    20-39% CK    40-59% CL    60-79% CM    80-99% CN     100%   Cifuentes   Score 0-56 56 45-55 34-44 23-33 12-22 1-11 0         G codes: In compliance with CMSs Claims Based Outcome Reporting, the following G-code set was chosen for this patient based on their primary functional limitation being treated:     The outcome measure chosen to determine the severity of the functional limitation was the Winkler with a score of 56/56 which was correlated with the impairment scale.    ? Mobility - Walking and Moving Around:     - CURRENT STATUS: CH - 0% impaired, limited or restricted    - GOAL STATUS: CH - 0% impaired, limited or restricted    - D/C STATUS:  CH - 0% impaired, limited or restricted          Physical Therapy Evaluation Charge Determination   History Examination Presentation Decision-Making   LOW Complexity : Zero comorbidities / personal factors that will impact the outcome / POC LOW Complexity : 1-2 Standardized tests and measures addressing body structure, function, activity limitation and / or participation in recreation  LOW Complexity : Stable, uncomplicated  LOW Complexity : FOTO score of       Based on the above components, the patient evaluation is determined to be of the following complexity level: LOW     Pain:Pain Scale 1: Numeric (0 - 10)  Pain Intensity 1: 3  Pain Location 1: Head  Activity Tolerance: WNL  Please refer to the flowsheet for vital signs taken during this treatment. After treatment:   [x]   Patient left in no apparent distress sitting up in chair  []   Patient left in no apparent distress in bed  [x]   Call bell left within reach  [x]   Nursing notified  [x]   Caregiver present  []   Bed alarm activated    COMMUNICATION/EDUCATION:   Communication/Collaboration:  [x]   Fall prevention education was provided and the patient/caregiver indicated understanding. [x]   Patient/family have participated as able and agree with findings and recommendations. []   Patient is unable to participate in plan of care at this time.   Findings and recommendations were discussed with: Occupational Therapist and Registered Nurse    Thank you for this referral.  Musa Weber, PT   Time Calculation: 25 mins

## 2017-07-13 NOTE — PROGRESS NOTES
Neurology Progress Note    Patient ID:  Navjot Fenton  [id number]  29 y.o.  1980    Chief Complaint: I couldn't sleep    Subjective:   Pt still reports HA but more related to not being able to sleep. He is only asking for pain medication at night to help him sleep. No vision changes. No focal numbness or weakness. Objective:    All records in [facility] reviewed and noted    ROS:  Per HPI  All other 12 pt ROS negative    Meds:  Current Facility-Administered Medications   Medication Dose Route Frequency    zolpidem (AMBIEN) tablet 5 mg  5 mg Oral QHS PRN    ferrous sulfate tablet 325 mg  1 Tab Oral DAILY WITH BREAKFAST    warfarin (COUMADIN) tablet 7.5 mg  7.5 mg Oral ONCE    WARFARIN INFORMATION NOTE (COUMADIN)   Other QPM    atorvastatin (LIPITOR) tablet 40 mg  40 mg Oral QHS    pantoprazole (PROTONIX) tablet 40 mg  40 mg Oral ACB    ondansetron (ZOFRAN) injection 4 mg  4 mg IntraVENous Q6H PRN    acetaminophen (TYLENOL) tablet 650 mg  650 mg Oral Q4H PRN    butalbital-acetaminophen-caffeine (FIORICET, ESGIC) -40 mg per tablet 1 Tab  1 Tab Oral Q6H PRN    hydrALAZINE (APRESOLINE) 20 mg/mL injection 10 mg  10 mg IntraVENous Q4H PRN    labetalol (NORMODYNE;TRANDATE) injection 10 mg  10 mg IntraVENous Q4H PRN    fluticasone (FLONASE) 50 mcg/actuation nasal spray 2 Spray  2 Spray Both Nostrils DAILY    enoxaparin (LOVENOX) injection 60 mg  1 mg/kg SubCUTAneous Q12H       Imaging:  MRI brain with MRV showed left transverse sinus thrombosis   Dopplers: neg for DVT    Lab Review   Results for orders placed or performed during the hospital encounter of 07/11/17   CBC WITH AUTOMATED DIFF   Result Value Ref Range    WBC 5.9 4.1 - 11.1 K/uL    RBC 4.14 4.10 - 5.70 M/uL    HGB 11.0 (L) 12.1 - 17.0 g/dL    HCT 33.2 (L) 36.6 - 50.3 %    MCV 80.2 80.0 - 99.0 FL    MCH 26.6 26.0 - 34.0 PG    MCHC 33.1 30.0 - 36.5 g/dL    RDW 22.4 (H) 11.5 - 14.5 %    PLATELET 370 458 - 449 K/uL    NEUTROPHILS 69 32 - 75 %    LYMPHOCYTES 22 12 - 49 %    MONOCYTES 8 5 - 13 %    EOSINOPHILS 1 0 - 7 %    BASOPHILS 0 0 - 1 %    ABS. NEUTROPHILS 4.0 1.8 - 8.0 K/UL    ABS. LYMPHOCYTES 1.3 0.8 - 3.5 K/UL    ABS. MONOCYTES 0.5 0.0 - 1.0 K/UL    ABS. EOSINOPHILS 0.1 0.0 - 0.4 K/UL    ABS. BASOPHILS 0.0 0.0 - 0.1 K/UL    RBC COMMENTS POLYCHROMASIA  PRESENT        RBC COMMENTS ANISOCYTOSIS  2+        RBC COMMENTS TARGET CELLS  1+       PROTHROMBIN TIME + INR   Result Value Ref Range    INR 0.9 0.9 - 1.1      Prothrombin time 9.2 9.0 - 11.1 sec   PTT   Result Value Ref Range    aPTT 23.8 22.1 - 32.5 sec    aPTT, therapeutic range     58.0 - 70.3 SECS   METABOLIC PANEL, COMPREHENSIVE   Result Value Ref Range    Sodium 136 136 - 145 mmol/L    Potassium 4.4 3.5 - 5.1 mmol/L    Chloride 97 97 - 108 mmol/L    CO2 29 21 - 32 mmol/L    Anion gap 10 5 - 15 mmol/L    Glucose 87 65 - 100 mg/dL    BUN 6 6 - 20 MG/DL    Creatinine 0.80 0.70 - 1.30 MG/DL    BUN/Creatinine ratio 8 (L) 12 - 20      GFR est AA >60 >60 ml/min/1.73m2    GFR est non-AA >60 >60 ml/min/1.73m2    Calcium 9.2 8.5 - 10.1 MG/DL    Bilirubin, total 0.5 0.2 - 1.0 MG/DL    ALT (SGPT) 51 12 - 78 U/L    AST (SGOT) 97 (H) 15 - 37 U/L    Alk.  phosphatase 81 45 - 117 U/L    Protein, total 8.2 6.4 - 8.2 g/dL    Albumin 4.3 3.5 - 5.0 g/dL    Globulin 3.9 2.0 - 4.0 g/dL    A-G Ratio 1.1 1.1 - 2.2     URINALYSIS W/MICROSCOPIC   Result Value Ref Range    Color YELLOW/STRAW      Appearance CLOUDY (A) CLEAR      Specific gravity >1.030 (H) 1.003 - 1.030    pH (UA) 6.0 5.0 - 8.0      Protein 100 (A) NEG mg/dL    Glucose NEGATIVE  NEG mg/dL    Ketone NEGATIVE  NEG mg/dL    Bilirubin NEGATIVE  NEG      Blood LARGE (A) NEG      Urobilinogen 1.0 0.2 - 1.0 EU/dL    Nitrites NEGATIVE  NEG      Leukocyte Esterase NEGATIVE  NEG      WBC 0-4 0 - 4 /hpf    RBC 0-5 0 - 5 /hpf    Epithelial cells FEW FEW /lpf    Bacteria NEGATIVE  NEG /hpf    Mucus 2+ (A) NEG /lpf   CBC W/O DIFF   Result Value Ref Range    WBC 5.2 4.1 - 11.1 K/uL    RBC 3.69 (L) 4.10 - 5.70 M/uL    HGB 9.7 (L) 12.1 - 17.0 g/dL    HCT 29.5 (L) 36.6 - 50.3 %    MCV 79.9 (L) 80.0 - 99.0 FL    MCH 26.3 26.0 - 34.0 PG    MCHC 32.9 30.0 - 36.5 g/dL    RDW 22.2 (H) 11.5 - 14.5 %    PLATELET 198 244 - 883 K/uL   LIPID PANEL   Result Value Ref Range    LIPID PROFILE          Cholesterol, total 253 (H) <200 MG/DL    Triglyceride 126 <150 MG/DL    HDL Cholesterol 109 MG/DL    LDL, calculated 118.8 (H) 0 - 100 MG/DL    VLDL, calculated 25.2 MG/DL    CHOL/HDL Ratio 2.3 0 - 5.0     HEMOGLOBIN A1C WITH EAG   Result Value Ref Range    Hemoglobin A1c 4.6 4.2 - 6.3 %    Est. average glucose Cannot be calulated mg/dL   METABOLIC PANEL, COMPREHENSIVE   Result Value Ref Range    Sodium 134 (L) 136 - 145 mmol/L    Potassium 3.6 3.5 - 5.1 mmol/L    Chloride 98 97 - 108 mmol/L    CO2 31 21 - 32 mmol/L    Anion gap 5 5 - 15 mmol/L    Glucose 80 65 - 100 mg/dL    BUN 5 (L) 6 - 20 MG/DL    Creatinine 0.72 0.70 - 1.30 MG/DL    BUN/Creatinine ratio 7 (L) 12 - 20      GFR est AA >60 >60 ml/min/1.73m2    GFR est non-AA >60 >60 ml/min/1.73m2    Calcium 8.9 8.5 - 10.1 MG/DL    Bilirubin, total 1.2 (H) 0.2 - 1.0 MG/DL    ALT (SGPT) 42 12 - 78 U/L    AST (SGOT) 60 (H) 15 - 37 U/L    Alk.  phosphatase 63 45 - 117 U/L    Protein, total 7.0 6.4 - 8.2 g/dL    Albumin 3.6 3.5 - 5.0 g/dL    Globulin 3.4 2.0 - 4.0 g/dL    A-G Ratio 1.1 1.1 - 2.2     PROTEIN S, FUNCTIONAL   Result Value Ref Range    Protein S-Functional 68 63 - 140 %   FACTOR II ACTIVITY   Result Value Ref Range    Factor II Activity 125 50 - 154 %   PROTEIN C ACTIVITY   Result Value Ref Range    Protein C-Functional 114 73 - 180 %   ANTITHROMBIN ACTIVITY   Result Value Ref Range    Antithrombin Activity 63 (L) 75 - 135 %   LUPUS ANTICOAGULANT PANEL W/ REFLEX   Result Value Ref Range    PTT-LA 46.3 0.0 - 51.9 sec    dRVVT 35.8 0.0 - 47.0 sec    Interpretation PENDING    HOMOCYST(E)INE, PLASMA   Result Value Ref Range Homocysteine, plasma 17.9 (H) 3.7 - 13.9 umol/L   CBC WITH AUTOMATED DIFF   Result Value Ref Range    WBC 4.0 (L) 4.1 - 11.1 K/uL    RBC 3.85 (L) 4.10 - 5.70 M/uL    HGB 10.1 (L) 12.1 - 17.0 g/dL    HCT 30.7 (L) 36.6 - 50.3 %    MCV 79.7 (L) 80.0 - 99.0 FL    MCH 26.2 26.0 - 34.0 PG    MCHC 32.9 30.0 - 36.5 g/dL    RDW 22.2 (H) 11.5 - 14.5 %    PLATELET 005 059 - 506 K/uL    NEUTROPHILS 52 32 - 75 %    LYMPHOCYTES 36 12 - 49 %    MONOCYTES 9 5 - 13 %    EOSINOPHILS 2 0 - 7 %    BASOPHILS 1 0 - 1 %    ABS. NEUTROPHILS 2.1 1.8 - 8.0 K/UL    ABS. LYMPHOCYTES 1.4 0.8 - 3.5 K/UL    ABS. MONOCYTES 0.4 0.0 - 1.0 K/UL    ABS. EOSINOPHILS 0.1 0.0 - 0.4 K/UL    ABS. BASOPHILS 0.0 0.0 - 0.1 K/UL    RBC COMMENTS ANISOCYTOSIS  2+        RBC COMMENTS TARGET CELLS  PRESENT        DF SMEAR SCANNED     METABOLIC PANEL, COMPREHENSIVE   Result Value Ref Range    Sodium 135 (L) 136 - 145 mmol/L    Potassium 3.8 3.5 - 5.1 mmol/L    Chloride 101 97 - 108 mmol/L    CO2 25 21 - 32 mmol/L    Anion gap 9 5 - 15 mmol/L    Glucose 104 (H) 65 - 100 mg/dL    BUN 3 (L) 6 - 20 MG/DL    Creatinine 0.70 0.70 - 1.30 MG/DL    BUN/Creatinine ratio 4 (L) 12 - 20      GFR est AA >60 >60 ml/min/1.73m2    GFR est non-AA >60 >60 ml/min/1.73m2    Calcium 9.1 8.5 - 10.1 MG/DL    Bilirubin, total 0.7 0.2 - 1.0 MG/DL    ALT (SGPT) 66 12 - 78 U/L    AST (SGOT) 144 (H) 15 - 37 U/L    Alk.  phosphatase 64 45 - 117 U/L    Protein, total 7.7 6.4 - 8.2 g/dL    Albumin 3.9 3.5 - 5.0 g/dL    Globulin 3.8 2.0 - 4.0 g/dL    A-G Ratio 1.0 (L) 1.1 - 2.2     IRON PROFILE   Result Value Ref Range    Iron 28 (L) 35 - 150 ug/dL    TIBC 370 250 - 450 ug/dL    Iron % saturation 8 (L) 20 - 50 %   FERRITIN   Result Value Ref Range    Ferritin 46 26 - 388 NG/ML   VITAMIN B12   Result Value Ref Range    Vitamin B12 787 211 - 911 pg/mL   FOLATE   Result Value Ref Range    Folate 19.6 5.0 - 21.0 ng/mL       Exam:  Visit Vitals    /90 (BP 1 Location: Left arm, BP Patient Position: At rest)    Pulse 75    Temp 99 °F (37.2 °C)    Resp 18    Ht 5' 10\" (1.778 m)    Wt 62.4 kg (137 lb 9.1 oz)    SpO2 99%    BMI 19.74 kg/m2     Gen: Well developed  CV: RRR  Lungs: non labored breathing  Abd: non distending  Neuro: A&O x 3, no dysarthria or aphasia  CN II-XII: PERRL, EOMI, face symmetric, tongue/palate midline  Motor: strength 5/5 all four ext  Sensory: intact to LT  Gait: normal    Assessment:     Patient Active Problem List   Diagnosis Code    Acute pancreatitis K85.90    Abnormal LFTs R79.89    Hyponatremia E87.1    Accelerated hypertension I10    Alcohol dependence ([facility] [address] F10.20    Subdural hematoma (HCC) I62.00    Headache R51    Transverse sinus thrombosis G18    56-year-old gentleman who presented to the emergency room with complaints of headache. Imaging revealed a left transverse sinus thrombosis. Patient has no known risk factors for this. Currently he is fully anticoagulated. Will evaluate for etiology including a hypercoagulable panel. Hematology consulted today. Panel pending. Pt with continued headache and insomnia. Plan:   1. MRI as above  2. Hypercoag panel pending  3. DVT neg  4. Good BP control  5. Fioricet for headache  6. Continue full dose Lovenox and transition to coumadin today. 7. Seizure precautions  8.  Agree with Laine Brand for sleep    Will follow      Signed:  Kimberly Sanz MD  2017  2:34 PM

## 2017-07-13 NOTE — PROGRESS NOTES
Occupational Therapy neurological EVALUATION with discharge  Patient: Bob Dennis (89 y.o. male)  Date: 7/13/2017  Primary Diagnosis: Headache  Subdural hematoma (HCC)        Precautions: none       ASSESSMENT:  Based on the objective data described below, the patient presents with report of occasional sensation in fingers and toe that feels like needles are being poked under his nails. Intact sensation this session. Pt presents with good balance, equal strength, intact vision and is independent with ADLs/IADLS and mobility. Further skilled acute occupational therapy is not indicated at this time. Discharge Recommendations: ome without services  Further Equipment Recommendations for Discharge: none     SUBJECTIVE:   Patient stated The bright lights bother my eyes. My headache is better. 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  Expanded or extensive additional review of patient history:     Home Situation  Home Environment: Private residence  # Steps to Enter: 6  Rails to Enter: Yes  Hand Rails : Bilateral  One/Two Story Residence: One story ([facility] on hill)  Living Alone: No  Support Systems: Family member(s)  Patient Expects to be Discharged to[de-identified] Private residence  Current DME Used/Available at Home: Blood pressure cuff  Tub or Shower Type: Shower  [x]  Right hand dominant   []  Left hand dominant    EXAMINATION OF PERFORMANCE DEFICITS:  Cognitive/Behavioral Status:  Neurologic State: Alert  Orientation Level: Oriented X4  Cognition: Appropriate decision making; Appropriate for age attention/concentration; Appropriate safety awareness  Perception: Appears intact  Perseveration: No perseveration noted  Safety/Judgement: Awareness of environment; Fall prevention;Home safety      Hearing: Auditory  Auditory Impairment: None    Vision/Perceptual:    Tracking: Able to track stimulus in all quadrants w/o difficulty                           Corrective Lenses: Glasses    Range of Motion:    AROM: Within functional limits  PROM: Within functional limits                      Strength:    Strength: Within functional limits                Coordination:  Coordination: Within functional limits  Fine Motor Skills-Upper: Left Intact; Right Intact    Gross Motor Skills-Upper: Left Intact; Right Intact    Tone & Sensation:    Tone: Normal  Sensation: Impaired (reports occasional numbness/pain in fingers/toes)                      Balance:  Sitting: Intact  Standing: Intact    Functional Mobility and Transfers for ADLs:  Bed Mobility:  Rolling: Independent  Supine to Sit: Independent  Sit to Supine: Independent  Scooting: Independent    Transfers:  Sit to Stand: Independent  Functional Transfers  Toilet Transfer : Independent    ADL Assessment:  Feeding: Independent    Oral Facial Hygiene/Grooming: Independent    Bathing: Independent    Upper Body Dressing: Independent    Lower Body Dressing: Independent    Toileting: Independent    Cognitive Retraining  Safety/Judgement: Awareness of environment; Fall prevention;Home safety    Therapeutic Exercise:       Functional Measure:   Fugl-Regalado Assessment of Motor Recovery after Stroke:     Reflex Activity  Flexors/Biceps/Fingers: Can be elicited  Extensors/Triceps: Can be elicited  Reflex Subtotal: 4    Volitional Movement Within Synergies  Shoulder Retraction: Full  Shoulder Elevation: Full  Shoulder Abduction (90 degrees): Full  Shoulder External Rotation: Full  Elbow Flexion: Full  Forearm Supination: Full  Shoulder Adduction/Internal Rotation: Full  Elbow Extension: Full  Forearm Pronation: Full  Subtotal: 18    Volitional Movement Mixing Synergies  Hand to Lumbar Spine: Full  Shoulder Flexion (0-90 degrees): Full  Pronation-Supination: Full  Subtotal: 6    Volitional Movement With Little or No Synergy  Shoulder Abduction (0-90 degrees): Full  Shoulder Flexion ( degrees): Full  Pronation/Supination: Full  Subtotal : 6    Normal Reflex Activity  Biceps, Triceps, Finger Flexors: Full  Subtotal : 2    Upper Extremity Total   Upper Extremity Total: 36    Wrist  Stability at 15 Degree Dorsiflexion: Full  Repeated Dorsiflexion/ Volar Flexion: Full  Stability at 15 Degree Dorsiflexion: Full  Repeated Dorsiflexion/ Volar Flexion: Full  Circumduction: Full  Wrist Total: 10    Hand  Mass Flexion: Full  Mass Extension: Full  Grasp A: Full  Grasp B: Full  Grasp C: Full  Grasp D: Full  Grasp E: Full  Hand Total: 14    Coordination/Speed  Tremor: None  Dysmetria: None  Time: <1s  Coordination/Speed Total : 6    Total A-D  Total A-D (Motor Function): 66/66     Percentage of impairment CH  0% CI  1-19% CJ  20-39% CK  40-59% CL  60-79% CM  80-99% CN  100%   Fugl-Regalado score: 0-66 66 53-65 39-52 26-38 13-25 1-12   0      This is a reliable/valid measure of arm function after a neurological event. It has established value to characterize functional status and for measuring spontaneous and therapy-induced recovery; tests proximal and distal motor functions. Fugl-Regalado Assessment  UE scores recorded between five and 30 days post neurologic event can be used to predict UE recovery at six months post neurologic event. Severe = 0-21 points   Moderately Severe = 22-33 points   Moderate = 34-47 points   Mild = 48-66 points  SETH Carvalho, MUKESH Mendosa, & DEMAR Roberts (1992). Measurement of motor recovery after stroke: Outcome assessment and sample size requirements. Stroke, 23, pp. 1414-3249. ------------------------------------------------------------------------------------------------------------------------------------------------------------------  MCID:  Stroke:   Rickie Lee et al, 2001; n = 171; mean age 79 (6) years; assessed within 16 (12) days of stroke, Acute Stroke)  FMA Motor Scores from Admission to Discharge   10 point increase in FMA Upper Extremity = 1.5 change in discharge FIM   10 point increase in FMA Lower Extremity = 1.9 change in discharge FIM  MDC:   Stroke:   Tan Torrez et al, 2008, n = 14, mean age = 59.9 (14.6) years, assessed on average 14 (6.5) months post stroke, Chronic Stroke)   FMA = 5.2 points for the Upper Extremity portion of the assessment     G codes: In compliance with CMSs Claims Based Outcome Reporting, the following G-code set was chosen for this patient based on their primary functional limitation being treated: The outcome measure chosen to determine the severity of the functional limitation was the fugl griggs with a score of 66/66 which was correlated with the impairment scale. ?  Self Care:     - CURRENT STATUS: CH - 0% impaired, limited or restricted    - GOAL STATUS: CH - 0% impaired, limited or restricted    - D/C STATUS:  CH - 0% impaired, limited or restricted        Occupational Therapy Evaluation Charge Determination   History Examination Decision-Making   LOW Complexity : Brief history review  LOW Complexity : 1-3 performance deficits relating to physical, cognitive , or psychosocial skils that result in activity limitations and / or participation restrictions  LOW Complexity : No comorbidities that affect functional and no verbal or physical assistance needed to complete eval tasks       Based on the above components, the patient evaluation is determined to be of the following complexity level: LOW     Pain:Pain Scale 1: Numeric (0 - 10)  Pain Intensity 1: 0              Activity Tolerance:     Please refer to the flowsheet for vital signs taken during this treatment. After treatment:   [x]  Patient left in no apparent distress sitting up in chair  []  Patient left in no apparent distress in bed  [x]  Call bell left within reach  [x]  Nursing notified  [x]  Caregiver present  []  Bed alarm activated    COMMUNICATION/EDUCATION:   Findings and recommendations were discussed with: Physical Therapist, Registered Nurse and patient and his wife    Patient was does not have deficits related to diagnosis       [x]      Home safety education was provided and the patient/caregiver indicated understanding. [x]      Patient/family have participated as able and agree with findings and recommendations. []      Patient is unable to participate in plan of care at this time. This patients plan of care is appropriate for delegation to [facility].     Thank you for this referral.  KAMERON Rodriguez/L  Time Calculation: 20 mins

## 2017-07-13 NOTE — PROGRESS NOTES
Hospitalist Progress Note    NAME: Brittni Medina   :  1980   MRN:  [id number]       Assessment / Plan:    Dural Sinus Thrombosis: (thrombosis of a superficial cortical vein left  Tentorium and thrombus in the left transverse sinus)  Severe Headache  -continue treatment dose Lovenox; Pharmacy consult to start Coumadin  -appreciate Neurology impression  -Seizure precautions  -continue prn Fioricet  -Hypercoagulable work-up pending   -consult Heme/Onc for completeness    Iron Deficiency Anemia: Micorcytic, patient complains of intermittent hemorrhoidal bleeding (no cirrhosis on review of 2017 CT A/P)  -monitor for bleeding while on Lovenox  -start daily iron    Mild Leukopenia: WBC 4.0 today  -monitor    Alcohol abuse  Hx of fatty liver (patient not overweight)  Elevated AST 97  -4-5 beers daily  -Denies history of DT's  -Banana bag discontinue  -CIWA with prn IV ativan  -cessation encouraged     Tobacco abuse  -smokes 2-3 cigarettes daily  -does not want nicotine patch.     Hypercholesterolemia  -start lipitor    Trouble Sleeping:  -start prn ambien     Code Status: full  Surrogate Decision Maker: wife  DVT Prophylaxis: started therapeutic lovenox for dural sinus thrombosis as in addendum. GI Prophylaxis: not indicated  Baseline: independent    Body mass index is 19.74 kg/(m^2).          Subjective:     Chief Complaint / Reason for Physician Visit: follow-up dural sinus thrombosis  Patient states that he did not sleep until 5:30 am, wants to transfer to different hallway  Headache better  Had mild bleeding from bottom last pm, makes light of if  Is willing to do Lovenox injections at home while on coumadin if he must    Review of Systems:  Symptom Y/N Comments  Symptom Y/N Comments   Fever/Chills    Chest Pain n    Poor Appetite    Edema     Cough    Abdominal Pain n    Sputum    Joint Pain     SOB/ALEX n   Pruritis/Rash     Nausea/vomit    Tolerating PT/OT     Diarrhea    Tolerating Diet y But poor appetite   Constipation    Other       Could NOT obtain due to:      Objective:     VITALS:   Last 24hrs VS reviewed since prior progress note. Most recent are:  Patient Vitals for the past 24 hrs:   Temp Pulse Resp BP SpO2   07/12/17 2318 99 °F (37.2 °C) 81 21 132/85 -   07/12/17 2009 99.3 °F (37.4 °C) 81 17 (!) 136/100 100 %   07/12/17 1636 98.3 °F (36.8 °C) 78 18 (!) 138/92 100 %   07/12/17 1632 - 77 13 (!) 138/92 -   07/12/17 1530 98.1 °F (36.7 °C) 93 16 (!) 150/104 100 %   07/12/17 1115 99.7 °F (37.6 °C) 70 16 (!) 138/93 100 %       Intake/Output Summary (Last 24 hours) at 07/13/17 0805  Last data filed at 07/12/17 1350   Gross per 24 hour   Intake              120 ml   Output                0 ml   Net              120 ml        PHYSICAL EXAM:  General: WD, WN. Alert, cooperative, no acute distress    EENT:  EOMI. Anicteric sclerae. MMM  Resp:  CTA bilaterally, no wheezing or rales. No accessory muscle use  CV:  Regular  rhythm,  No edema  GI:  Soft, Non distended, Non tender.  +Bowel sounds  Neurologic:  Alert and oriented X 3, normal speech,   Psych:   Good insight. Not anxious nor agitated  Skin:  No rashes. No jaundice    Reviewed most current lab test results and cultures  YES  Reviewed most current radiology test results   YES  Review and summation of old records today    NO  Reviewed patient's current orders and MAR    YES  PMH/SH reviewed - no change compared to H&P  ________________________________________________________________________  Care Plan discussed with:    Comments   Patient x    Family  x    RN     Care Manager     Consultant                        Multidiciplinary team rounds were held today with , nursing, pharmacist and clinical coordinator. Patient's plan of care was discussed; medications were reviewed and discharge planning was addressed.      ________________________________________________________________________  Total NON critical care TIME:  25   Minutes    Total CRITICAL CARE TIME Spent:   Minutes non procedure based      Comments   >50% of visit spent in counseling and coordination of care     ________________________________________________________________________  Cherise Tran MD     Procedures: see electronic medical records for all procedures/Xrays and details which were not copied into this note but were reviewed prior to creation of Plan. LABS:  I reviewed today's most current labs and imaging studies.   Pertinent labs include:  Recent Labs      07/13/17 0444 07/12/17 0434 07/11/17   1003   WBC  4.0*  5.2  5.9   HGB  10.1*  9.7*  11.0*   HCT  30.7*  29.5*  33.2*   PLT  196  200  234     Recent Labs      07/13/17 0444 07/12/17 0434 07/11/17   1003   NA  135*  134*  136   K  3.8  3.6  4.4   CL  101  98  97   CO2  25  31  29   GLU  104*  80  87   BUN  3*  5*  6   CREA  0.70  0.72  0.80   CA  9.1  8.9  9.2   ALB  3.9  3.6  4.3   TBILI  0.7  1.2*  0.5   SGOT  144*  60*  97*   ALT  66  42  51   INR   --    --   0.9       Signed: Cherise Tran MD

## 2017-07-14 VITALS
BODY MASS INDEX: 19.69 KG/M2 | SYSTOLIC BLOOD PRESSURE: 142 MMHG | RESPIRATION RATE: 16 BRPM | TEMPERATURE: 99 F | HEIGHT: 70 IN | DIASTOLIC BLOOD PRESSURE: 99 MMHG | HEART RATE: 73 BPM | OXYGEN SATURATION: 100 % | WEIGHT: 137.57 LBS

## 2017-07-14 PROBLEM — D50.9 IRON DEFICIENCY ANEMIA: Status: ACTIVE | Noted: 2017-07-14

## 2017-07-14 PROBLEM — E78.5 HYPERLIPIDEMIA: Status: ACTIVE | Noted: 2017-07-14

## 2017-07-14 PROBLEM — S06.5XAA SUBDURAL HEMATOMA: Status: RESOLVED | Noted: 2017-07-11 | Resolved: 2017-07-14

## 2017-07-14 PROBLEM — R51.9 HEADACHE: Status: RESOLVED | Noted: 2017-07-11 | Resolved: 2017-07-14

## 2017-07-14 LAB
ANION GAP BLD CALC-SCNC: 10 MMOL/L (ref 5–15)
B2 GLYCOPROT1 IGG SER-ACNC: <9 GPI IGG UNITS (ref 0–20)
B2 GLYCOPROT1 IGM SER-ACNC: <9 GPI IGM UNITS (ref 0–32)
BASOPHILS # BLD AUTO: 0 K/UL (ref 0–0.1)
BASOPHILS # BLD: 0 % (ref 0–1)
BUN SERPL-MCNC: 3 MG/DL (ref 6–20)
BUN/CREAT SERPL: 4 (ref 12–20)
CALCIUM SERPL-MCNC: 9.9 MG/DL (ref 8.5–10.1)
CHLORIDE SERPL-SCNC: 100 MMOL/L (ref 97–108)
CO2 SERPL-SCNC: 26 MMOL/L (ref 21–32)
CREAT SERPL-MCNC: 0.69 MG/DL (ref 0.7–1.3)
DIFFERENTIAL METHOD BLD: ABNORMAL
EOSINOPHIL # BLD: 0.1 K/UL (ref 0–0.4)
EOSINOPHIL NFR BLD: 2 % (ref 0–7)
ERYTHROCYTE [DISTWIDTH] IN BLOOD BY AUTOMATED COUNT: 22.1 % (ref 11.5–14.5)
GLUCOSE SERPL-MCNC: 95 MG/DL (ref 65–100)
HCT VFR BLD AUTO: 31.8 % (ref 36.6–50.3)
HGB BLD-MCNC: 10.6 G/DL (ref 12.1–17)
INR PPP: 1 (ref 0.9–1.1)
LYMPHOCYTES # BLD AUTO: 45 % (ref 12–49)
LYMPHOCYTES # BLD: 2 K/UL (ref 0.8–3.5)
MCH RBC QN AUTO: 26.8 PG (ref 26–34)
MCHC RBC AUTO-ENTMCNC: 33.3 G/DL (ref 30–36.5)
MCV RBC AUTO: 80.3 FL (ref 80–99)
MONOCYTES # BLD: 0.2 K/UL (ref 0–1)
MONOCYTES NFR BLD AUTO: 5 % (ref 5–13)
NEUTS SEG # BLD: 2.1 K/UL (ref 1.8–8)
NEUTS SEG NFR BLD AUTO: 48 % (ref 32–75)
PLATELET # BLD AUTO: 207 K/UL (ref 150–400)
POTASSIUM SERPL-SCNC: 3.9 MMOL/L (ref 3.5–5.1)
PROTHROMBIN TIME: 9.6 SEC (ref 9–11.1)
RBC # BLD AUTO: 3.96 M/UL (ref 4.1–5.7)
RBC MORPH BLD: ABNORMAL
SODIUM SERPL-SCNC: 136 MMOL/L (ref 136–145)
WBC # BLD AUTO: 4.4 K/UL (ref 4.1–11.1)
WBC MORPH BLD: ABNORMAL

## 2017-07-14 PROCEDURE — 74011250637 HC RX REV CODE- 250/637: Performed by: INTERNAL MEDICINE

## 2017-07-14 PROCEDURE — 74011250636 HC RX REV CODE- 250/636: Performed by: INTERNAL MEDICINE

## 2017-07-14 PROCEDURE — 36415 COLL VENOUS BLD VENIPUNCTURE: CPT

## 2017-07-14 PROCEDURE — 81240 F2 GENE: CPT | Performed by: INTERNAL MEDICINE

## 2017-07-14 PROCEDURE — 80048 BASIC METABOLIC PNL TOTAL CA: CPT

## 2017-07-14 PROCEDURE — 85610 PROTHROMBIN TIME: CPT

## 2017-07-14 PROCEDURE — 85025 COMPLETE CBC W/AUTO DIFF WBC: CPT

## 2017-07-14 RX ORDER — ENOXAPARIN SODIUM 100 MG/ML
1 INJECTION SUBCUTANEOUS EVERY 12 HOURS
Qty: 23 SYRINGE | Refills: 0 | Status: SHIPPED | OUTPATIENT
Start: 2017-07-14 | End: 2017-07-20 | Stop reason: ALTCHOICE

## 2017-07-14 RX ORDER — ATORVASTATIN CALCIUM 40 MG/1
40 TABLET, FILM COATED ORAL
Qty: 30 TAB | Refills: 0 | Status: SHIPPED | OUTPATIENT
Start: 2017-07-14 | End: 2017-08-24 | Stop reason: SDUPTHER

## 2017-07-14 RX ORDER — LANOLIN ALCOHOL/MO/W.PET/CERES
325 CREAM (GRAM) TOPICAL
Qty: 30 TAB | Refills: 0 | Status: SHIPPED | OUTPATIENT
Start: 2017-07-14 | End: 2017-08-17 | Stop reason: SDUPTHER

## 2017-07-14 RX ORDER — ZOLPIDEM TARTRATE 5 MG/1
10 TABLET ORAL
Status: DISCONTINUED | OUTPATIENT
Start: 2017-07-14 | End: 2017-07-14 | Stop reason: HOSPADM

## 2017-07-14 RX ORDER — WARFARIN 7.5 MG/1
7.5 TABLET ORAL ONCE
Qty: 10 TAB | Refills: 0 | Status: SHIPPED | OUTPATIENT
Start: 2017-07-14 | End: 2017-07-14

## 2017-07-14 RX ORDER — ONDANSETRON 4 MG/1
4 TABLET, FILM COATED ORAL
Qty: 30 TAB | Refills: 0 | Status: SHIPPED | OUTPATIENT
Start: 2017-07-14 | End: 2017-08-03 | Stop reason: SDUPTHER

## 2017-07-14 RX ADMIN — FLUTICASONE PROPIONATE 2 SPRAY: 50 SPRAY, METERED NASAL at 08:54

## 2017-07-14 RX ADMIN — ENOXAPARIN SODIUM 60 MG: 60 INJECTION SUBCUTANEOUS at 08:54

## 2017-07-14 RX ADMIN — PANTOPRAZOLE SODIUM 40 MG: 40 TABLET, DELAYED RELEASE ORAL at 08:53

## 2017-07-14 RX ADMIN — FERROUS SULFATE TAB 325 MG (65 MG ELEMENTAL FE) 325 MG: 325 (65 FE) TAB at 08:54

## 2017-07-14 RX ADMIN — WARFARIN SODIUM 7.5 MG: 2.5 TABLET ORAL at 17:36

## 2017-07-14 NOTE — DISCHARGE SUMMARY
Hospitalist Discharge Summary     Patient ID:  Shantelle Varghese  [id number]  70 y.o.  1980    PCP on record: Tyler Molina NP    Admit date: 7/11/2017  Discharge date and time: 7/14/2017      DISCHARGE DIAGNOSIS:  Active Problems:    Dural venous sinus thrombosis (7/12/2017)      Iron deficiency anemia (7/14/2017)      Hyperlipidemia (7/14/2017)         CONSULTATIONS:  IP CONSULT TO NEUROSURGERY  IP CONSULT TO NEUROLOGY  IP CONSULT TO HEMATOLOGY    Excerpted HPI from H&P of Kelvin Schreiber MD:  Kevin Germain is a 29 y.o.  male with PMH of hypertriglyceridemia, alcohol abuse, and tobacco use. Presents to the ED with complaint of headache for ~ 1 month. Denies history of headache in the past. Says pain can reach 10/10 and is located on the  \"top of his head. \" Reports associated photophobia and nausea/vomiting. No change in vision, sensation, or strength. He reports having a cold two weeks ago with sinus congestion which has not completely resolved. He works as a  and often experiences stiff necks, musculoskeletal in nature, which improve with massage. He feels both the sinus congestion and stiff neck are contributing to his headache. Says was taking dayquil, nyquil, and mucinex for his cold. He was taking Excedrin migraine, and goody's powder without much relief for his headache. Only home medication is Nexium. He smokes 2-3 cigarettes daily. drinks 4-5 beers daily and has had pancreatitis in the past related to alcohol vs hypertriglyceridemia.      In ED CT is concerning for subtle left tentorium subdural hematoma vs SAH in addition to questionable adjacent thrombosis of left transverse sinus. ED physician spoke with neurosurgery and MRI/MRV is pending.     We were asked to admit for work up and evaluation of the above problems. \"     ______________________________________________________________________  DISCHARGE SUMMARY/HOSPITAL COURSE:  for full details see H&P, daily progress notes, labs, consult notes. Hospitalization via problem below:  Dural Sinus Thrombosis: (thrombosis of a superficial cortical vein left Tentorium and thrombus in the left transverse sinus)  -start treatment dose Lovenox as a bridge to therapeutic INR on coumadin  -patient educated extensively on this topic    Severe Headache: resolved     Iron Deficiency Anemia: Micorcytic, patient complains of intermittent hemorrhoidal bleeding (no cirrhosis on review of Jan 2017 CT A/P)  -Hgb stable, oral iron started    Alcohol abuse  Hx of fatty liver (patient not overweight)  Elevated AST 97  -no signs of withdrawal while in hospital      Tobacco abuse  -smokes 2-3 cigarettes daily  -does not want nicotine patch.      Hypercholesterolemia  -start lipitor    Mild Leukopenia: resolved      _______________________________________________________________________  Patient seen and examined by me on discharge day. Pertinent Findings:  Gen:    Not in distress  Chest: No Accessory Muscle Use  CVS:   No edema  Abd:  ND  Neuro:  Alert  _______________________________________________________________________  DISCHARGE MEDICATIONS:   Current Discharge Medication List      START taking these medications    Details   atorvastatin (LIPITOR) 40 mg tablet Take 1 Tab by mouth nightly. Qty: 30 Tab, Refills: 0      enoxaparin (LOVENOX) 60 mg/0.6 mL injection 60 mg by SubCUTAneous route every twelve (12) hours every twelve (12) hours. Stop using this medication once you INR is 2.0 or greater (Goal INR is 2.0 to 3.0)  Indications: dural sinus thrombosis  Qty: 23 Syringe, Refills: 0      ferrous sulfate 325 mg (65 mg iron) tablet Take 1 Tab by mouth daily (with breakfast). Qty: 30 Tab, Refills: 0      warfarin (COUMADIN) 7.5 mg tablet Take 1 Tab by mouth daily with Supper  Qty: 10 Tab, Refills: 0         CONTINUE these medications which have NOT CHANGED    Details   esomeprazole (NEXIUM) 20 mg capsule Take 1 Cap by mouth daily.   Qty: 30 Cap, Refills: 6    Associated Diagnoses: History of pancreatitis; Gastroesophageal reflux disease without esophagitis         STOP taking these medications       OTHER Comments:   Reason for Stopping:               My Recommended Diet, Activity, Wound Care, and follow-up labs are listed in the patient's Discharge Insturctions which I have personally completed and reviewed. _______________________________________________________________________  DISPOSITION:    Home with Family: x   Home with HH/PT/OT/RN:    SNF/LTC:    PEARL:    OTHER:        Condition at Discharge:  Stable  _______________________________________________________________________  Follow up with:   PCP : Matthew Bullock NP  Follow-up Information     Follow up With Details Comments [address] [facility] [place] [address], NP On 7/17/2017 You have an appointment scheduled for 10:30AM to have your INR checked.  [address]  [address]      [address] Sofia Batista MD In 4 weeks Call for appt [address]  [address]  [facility]  [phone]                Total time in minutes spent coordinating this discharge (includes going over instructions, follow-up, prescriptions, and preparing report for sign off to her PCP) :  35 minutes    Signed:  Vernona Curling, MD

## 2017-07-14 NOTE — PROGRESS NOTES
Neurology Progress Note    Patient ID:  Jonny Awad  [id number]  1 y.o.  1980    Chief Complaint: my head is better    Subjective:   Pt feels pain is a 0-1/10. No headache now. Tolerating meds fine. Objective: All records in [facility] reviewed and noted    ROS:  Per HPI  All other 12 pt ROS negative    Meds:  Current Facility-Administered Medications   Medication Dose Route Frequency    warfarin (COUMADIN) tablet 7.5 mg  7.5 mg Oral ONCE    zolpidem (AMBIEN) tablet 10 mg  10 mg Oral QHS PRN    ferrous sulfate tablet 325 mg  1 Tab Oral DAILY WITH BREAKFAST    WARFARIN INFORMATION NOTE (COUMADIN)   Other QPM    atorvastatin (LIPITOR) tablet 40 mg  40 mg Oral QHS    pantoprazole (PROTONIX) tablet 40 mg  40 mg Oral ACB    ondansetron (ZOFRAN) injection 4 mg  4 mg IntraVENous Q6H PRN    acetaminophen (TYLENOL) tablet 650 mg  650 mg Oral Q4H PRN    butalbital-acetaminophen-caffeine (FIORICET, ESGIC) -40 mg per tablet 1 Tab  1 Tab Oral Q6H PRN    hydrALAZINE (APRESOLINE) 20 mg/mL injection 10 mg  10 mg IntraVENous Q4H PRN    labetalol (NORMODYNE;TRANDATE) injection 10 mg  10 mg IntraVENous Q4H PRN    fluticasone (FLONASE) 50 mcg/actuation nasal spray 2 Spray  2 Spray Both Nostrils DAILY    enoxaparin (LOVENOX) injection 60 mg  1 mg/kg SubCUTAneous Q12H       Imaging:  MRI brain with MRV showed left transverse sinus thrombosis   Dopplers: neg for DVT    Lab Review   Results for orders placed or performed during the hospital encounter of 07/11/17   CBC WITH AUTOMATED DIFF   Result Value Ref Range    WBC 5.9 4.1 - 11.1 K/uL    RBC 4.14 4.10 - 5.70 M/uL    HGB 11.0 (L) 12.1 - 17.0 g/dL    HCT 33.2 (L) 36.6 - 50.3 %    MCV 80.2 80.0 - 99.0 FL    MCH 26.6 26.0 - 34.0 PG    MCHC 33.1 30.0 - 36.5 g/dL    RDW 22.4 (H) 11.5 - 14.5 %    PLATELET 574 204 - 624 K/uL    NEUTROPHILS 69 32 - 75 %    LYMPHOCYTES 22 12 - 49 %    MONOCYTES 8 5 - 13 %    EOSINOPHILS 1 0 - 7 %    BASOPHILS 0 0 - 1 %    ABS. NEUTROPHILS 4.0 1.8 - 8.0 K/UL    ABS. LYMPHOCYTES 1.3 0.8 - 3.5 K/UL    ABS. MONOCYTES 0.5 0.0 - 1.0 K/UL    ABS. EOSINOPHILS 0.1 0.0 - 0.4 K/UL    ABS. BASOPHILS 0.0 0.0 - 0.1 K/UL    RBC COMMENTS POLYCHROMASIA  PRESENT        RBC COMMENTS ANISOCYTOSIS  2+        RBC COMMENTS TARGET CELLS  1+       PROTHROMBIN TIME + INR   Result Value Ref Range    INR 0.9 0.9 - 1.1      Prothrombin time 9.2 9.0 - 11.1 sec   PTT   Result Value Ref Range    aPTT 23.8 22.1 - 32.5 sec    aPTT, therapeutic range     58.0 - 67.7 SECS   METABOLIC PANEL, COMPREHENSIVE   Result Value Ref Range    Sodium 136 136 - 145 mmol/L    Potassium 4.4 3.5 - 5.1 mmol/L    Chloride 97 97 - 108 mmol/L    CO2 29 21 - 32 mmol/L    Anion gap 10 5 - 15 mmol/L    Glucose 87 65 - 100 mg/dL    BUN 6 6 - 20 MG/DL    Creatinine 0.80 0.70 - 1.30 MG/DL    BUN/Creatinine ratio 8 (L) 12 - 20      GFR est AA >60 >60 ml/min/1.73m2    GFR est non-AA >60 >60 ml/min/1.73m2    Calcium 9.2 8.5 - 10.1 MG/DL    Bilirubin, total 0.5 0.2 - 1.0 MG/DL    ALT (SGPT) 51 12 - 78 U/L    AST (SGOT) 97 (H) 15 - 37 U/L    Alk.  phosphatase 81 45 - 117 U/L    Protein, total 8.2 6.4 - 8.2 g/dL    Albumin 4.3 3.5 - 5.0 g/dL    Globulin 3.9 2.0 - 4.0 g/dL    A-G Ratio 1.1 1.1 - 2.2     URINALYSIS W/MICROSCOPIC   Result Value Ref Range    Color YELLOW/STRAW      Appearance CLOUDY (A) CLEAR      Specific gravity >1.030 (H) 1.003 - 1.030    pH (UA) 6.0 5.0 - 8.0      Protein 100 (A) NEG mg/dL    Glucose NEGATIVE  NEG mg/dL    Ketone NEGATIVE  NEG mg/dL    Bilirubin NEGATIVE  NEG      Blood LARGE (A) NEG      Urobilinogen 1.0 0.2 - 1.0 EU/dL    Nitrites NEGATIVE  NEG      Leukocyte Esterase NEGATIVE  NEG      WBC 0-4 0 - 4 /hpf    RBC 0-5 0 - 5 /hpf    Epithelial cells FEW FEW /lpf    Bacteria NEGATIVE  NEG /hpf    Mucus 2+ (A) NEG /lpf   CBC W/O DIFF   Result Value Ref Range    WBC 5.2 4.1 - 11.1 K/uL    RBC 3.69 (L) 4.10 - 5.70 M/uL    HGB 9.7 (L) 12.1 - 17.0 g/dL    HCT 29.5 (L) 36.6 - 50.3 %    MCV 79.9 (L) 80.0 - 99.0 FL    MCH 26.3 26.0 - 34.0 PG    MCHC 32.9 30.0 - 36.5 g/dL    RDW 22.2 (H) 11.5 - 14.5 %    PLATELET 725 156 - 421 K/uL   LIPID PANEL   Result Value Ref Range    LIPID PROFILE          Cholesterol, total 253 (H) <200 MG/DL    Triglyceride 126 <150 MG/DL    HDL Cholesterol 109 MG/DL    LDL, calculated 118.8 (H) 0 - 100 MG/DL    VLDL, calculated 25.2 MG/DL    CHOL/HDL Ratio 2.3 0 - 5.0     HEMOGLOBIN A1C WITH EAG   Result Value Ref Range    Hemoglobin A1c 4.6 4.2 - 6.3 %    Est. average glucose Cannot be calulated mg/dL   METABOLIC PANEL, COMPREHENSIVE   Result Value Ref Range    Sodium 134 (L) 136 - 145 mmol/L    Potassium 3.6 3.5 - 5.1 mmol/L    Chloride 98 97 - 108 mmol/L    CO2 31 21 - 32 mmol/L    Anion gap 5 5 - 15 mmol/L    Glucose 80 65 - 100 mg/dL    BUN 5 (L) 6 - 20 MG/DL    Creatinine 0.72 0.70 - 1.30 MG/DL    BUN/Creatinine ratio 7 (L) 12 - 20      GFR est AA >60 >60 ml/min/1.73m2    GFR est non-AA >60 >60 ml/min/1.73m2    Calcium 8.9 8.5 - 10.1 MG/DL    Bilirubin, total 1.2 (H) 0.2 - 1.0 MG/DL    ALT (SGPT) 42 12 - 78 U/L    AST (SGOT) 60 (H) 15 - 37 U/L    Alk.  phosphatase 63 45 - 117 U/L    Protein, total 7.0 6.4 - 8.2 g/dL    Albumin 3.6 3.5 - 5.0 g/dL    Globulin 3.4 2.0 - 4.0 g/dL    A-G Ratio 1.1 1.1 - 2.2     PROTEIN S, FUNCTIONAL   Result Value Ref Range    Protein S-Functional 68 63 - 140 %   FACTOR II ACTIVITY   Result Value Ref Range    Factor II Activity 125 50 - 154 %   PROTEIN C ACTIVITY   Result Value Ref Range    Protein C-Functional 114 73 - 180 %   CARDIOLIPIN AB, IGG   Result Value Ref Range    Cardiolipin Ab, IgG <9 0 - 14 GPL U/mL   CARDIOLIPIN AB, IGM   Result Value Ref Range    Cardiolipin Ab, IgM <9 0 - 12 MPL U/mL   ANTITHROMBIN ACTIVITY   Result Value Ref Range    Antithrombin Activity 63 (L) 75 - 135 %   LUPUS ANTICOAGULANT PANEL W/ REFLEX   Result Value Ref Range    PTT-LA 46.3 0.0 - 51.9 sec    dRVVT 35.8 0.0 - 47.0 sec    Interpretation Comment: HOMOCYST(E)INE, PLASMA   Result Value Ref Range    Homocysteine, plasma 17.9 (H) 3.7 - 13.9 umol/L   B-2 GLYCOPROTEIN AB, IGG/IGM   Result Value Ref Range    Beta-2 Glycoprotein I Ab, IgG <9 0 - 20 GPI IgG units    Beta-2 Glycoprotein I Ab, IgM <9 0 - 32 GPI IgM units   CBC WITH AUTOMATED DIFF   Result Value Ref Range    WBC 4.0 (L) 4.1 - 11.1 K/uL    RBC 3.85 (L) 4.10 - 5.70 M/uL    HGB 10.1 (L) 12.1 - 17.0 g/dL    HCT 30.7 (L) 36.6 - 50.3 %    MCV 79.7 (L) 80.0 - 99.0 FL    MCH 26.2 26.0 - 34.0 PG    MCHC 32.9 30.0 - 36.5 g/dL    RDW 22.2 (H) 11.5 - 14.5 %    PLATELET 267 839 - 440 K/uL    NEUTROPHILS 52 32 - 75 %    LYMPHOCYTES 36 12 - 49 %    MONOCYTES 9 5 - 13 %    EOSINOPHILS 2 0 - 7 %    BASOPHILS 1 0 - 1 %    ABS. NEUTROPHILS 2.1 1.8 - 8.0 K/UL    ABS. LYMPHOCYTES 1.4 0.8 - 3.5 K/UL    ABS. MONOCYTES 0.4 0.0 - 1.0 K/UL    ABS. EOSINOPHILS 0.1 0.0 - 0.4 K/UL    ABS. BASOPHILS 0.0 0.0 - 0.1 K/UL    RBC COMMENTS ANISOCYTOSIS  2+        RBC COMMENTS TARGET CELLS  PRESENT        DF SMEAR SCANNED     METABOLIC PANEL, COMPREHENSIVE   Result Value Ref Range    Sodium 135 (L) 136 - 145 mmol/L    Potassium 3.8 3.5 - 5.1 mmol/L    Chloride 101 97 - 108 mmol/L    CO2 25 21 - 32 mmol/L    Anion gap 9 5 - 15 mmol/L    Glucose 104 (H) 65 - 100 mg/dL    BUN 3 (L) 6 - 20 MG/DL    Creatinine 0.70 0.70 - 1.30 MG/DL    BUN/Creatinine ratio 4 (L) 12 - 20      GFR est AA >60 >60 ml/min/1.73m2    GFR est non-AA >60 >60 ml/min/1.73m2    Calcium 9.1 8.5 - 10.1 MG/DL    Bilirubin, total 0.7 0.2 - 1.0 MG/DL    ALT (SGPT) 66 12 - 78 U/L    AST (SGOT) 144 (H) 15 - 37 U/L    Alk.  phosphatase 64 45 - 117 U/L    Protein, total 7.7 6.4 - 8.2 g/dL    Albumin 3.9 3.5 - 5.0 g/dL    Globulin 3.8 2.0 - 4.0 g/dL    A-G Ratio 1.0 (L) 1.1 - 2.2     IRON PROFILE   Result Value Ref Range    Iron 28 (L) 35 - 150 ug/dL    TIBC 370 250 - 450 ug/dL    Iron % saturation 8 (L) 20 - 50 %   FERRITIN   Result Value Ref Range    Ferritin 46 26 - 388 NG/ML VITAMIN B12   Result Value Ref Range    Vitamin B12 787 211 - 911 pg/mL   FOLATE   Result Value Ref Range    Folate 19.6 5.0 - 21.0 ng/mL   CBC WITH AUTOMATED DIFF   Result Value Ref Range    WBC 4.4 4.1 - 11.1 K/uL    RBC 3.96 (L) 4.10 - 5.70 M/uL    HGB 10.6 (L) 12.1 - 17.0 g/dL    HCT 31.8 (L) 36.6 - 50.3 %    MCV 80.3 80.0 - 99.0 FL    MCH 26.8 26.0 - 34.0 PG    MCHC 33.3 30.0 - 36.5 g/dL    RDW 22.1 (H) 11.5 - 14.5 %    PLATELET 863 155 - 409 K/uL    NEUTROPHILS 48 32 - 75 %    LYMPHOCYTES 45 12 - 49 %    MONOCYTES 5 5 - 13 %    EOSINOPHILS 2 0 - 7 %    BASOPHILS 0 0 - 1 %    ABS. NEUTROPHILS 2.1 1.8 - 8.0 K/UL    ABS. LYMPHOCYTES 2.0 0.8 - 3.5 K/UL    ABS. MONOCYTES 0.2 0.0 - 1.0 K/UL    ABS. EOSINOPHILS 0.1 0.0 - 0.4 K/UL    ABS. BASOPHILS 0.0 0.0 - 0.1 K/UL    RBC COMMENTS ANISOCYTOSIS  2+        WBC COMMENTS TARGET CELLS      DF SMEAR SCANNED     METABOLIC PANEL, BASIC   Result Value Ref Range    Sodium 136 136 - 145 mmol/L    Potassium 3.9 3.5 - 5.1 mmol/L    Chloride 100 97 - 108 mmol/L    CO2 26 21 - 32 mmol/L    Anion gap 10 5 - 15 mmol/L    Glucose 95 65 - 100 mg/dL    BUN 3 (L) 6 - 20 MG/DL    Creatinine 0.69 (L) 0.70 - 1.30 MG/DL    BUN/Creatinine ratio 4 (L) 12 - 20      GFR est AA >60 >60 ml/min/1.73m2    GFR est non-AA >60 >60 ml/min/1.73m2    Calcium 9.9 8.5 - 10.1 MG/DL   PROTHROMBIN TIME + INR   Result Value Ref Range    INR 1.0 0.9 - 1.1      Prothrombin time 9.6 9.0 - 11.1 sec       Exam:  Visit Vitals    BP (!) 142/99 (BP 1 Location: Right arm, BP Patient Position: At rest;Sitting)    Pulse 73    Temp 99 °F (37.2 °C)    Resp 16    Ht 5' 10\" (1.778 m)    Wt 62.4 kg (137 lb 9.1 oz)    SpO2 100%    BMI 19.74 kg/m2     Gen:  Well developed  CV: RRR  Lungs: non labored breathing  Abd: non distending  Neuro: A&O x 3, no dysarthria or aphasia  CN II-XII: PERRL, EOMI, face symmetric, tongue/palate midline  Motor: strength 5/5 all four ext  Sensory: intact to LT  Gait: normal    Assessment: Patient Active Problem List   Diagnosis Code    Acute pancreatitis K85.90    Abnormal LFTs R79.89    Hyponatremia E87.1    Accelerated hypertension I10    Alcohol dependence (HCC) F10.20    Subdural hematoma (HCC) I62.00    Headache R51    Transverse sinus thrombosis G18    52-year-old gentleman who presented to the emergency room with complaints of headache. Imaging revealed a left transverse sinus thrombosis. Patient has no known risk factors for this. Currently he is fully anticoagulated. Will evaluate for etiology including a hypercoagulable panel. Hematology also following. Pt now on coumadin today. Plan:   1. MRI as above  2. Hypercoag panel in process  3. DVT neg  4. Good BP control  5. Fioricet for headache prn  6. Continue full dose Lovenox and coumadin with INR goal 2-3  7. Seizure precautions  8. Agree with Delia Panchal for sleep and will increase    Pt stable from neuro standpoint. He should f/u with neuro as outpt in 2 wks. Cont coumadin for 3-6 months. Will need to follow with PCP or hematology for monitoring for this. Will sign off but please call with questions.        Signed:  Brenda Munroe MD  7/14/2017  2:34 PM

## 2017-07-14 NOTE — DISCHARGE INSTRUCTIONS
HOSPITALIST DISCHARGE INSTRUCTIONS    NAME: Ben Plummer   :  1980   MRN:  [id number]     Date/Time:  2017 4:08 PM    ADMIT DATE: 2017     DISCHARGE DATE: 2017     DISCHARGE DIAGNOSIS:  Active Problems:    Dural venous sinus thrombosis (2017)      Iron deficiency anemia (2017)      Hyperlipidemia (2017)         MEDICATIONS:  As per medication reconciliation  list  · It is important that you take the medication exactly as they are prescribed. · Keep your medication in the bottles provided by the pharmacist and keep a list of the medication names, dosages, and times to be taken in your wallet. · Do not take other medications without consulting your doctor. Pain Management: Tylenol as needed for headache. Avoid NSAIDs like ibuprofen as they will increase you bleeding risk while taking warfarin (coumadin). What to do at Home    Recommended diet:  Regular Diet    Recommended activity: Activity as tolerated      INR is a a blood test that measures how thin your blood is on coumadin. A normal INR (someone who is not on coumadin) is 1.0. You will know that your INR is therapeutic when it is between 2.0 and 3.0 and this means that your blood is the appropriate thinness. It typically takes about 3 days to see a change in your INR after you coumadin dose is adjusted. Be cautious as there are many medications that can interact with coumadin. Continue the Lovenox injections until told to stop by your PCP. Lovenox thins your blood in a different way than coumadin. The goal is to continue the lovenox for 24 hours after your INR is therapeutic and then to stop lovenox. If you get very large bruises at the Lovenox injection site then call your PCP or come to the Emergency Department.       If you experience any of the following symptoms then please call your primary care physician or return to the emergency room if you cannot get hold of your doctor:  Fever, chills, nausea, vomiting, diarrhea, change in mentation, falling, bleeding, shortness of breath, chest pain or any other concerning symptoms. Follow Up:  Dr. Arthur Bhakta, NP  Next week as scheduled to have you INR checked. Information obtained by :  I understand that if any problems occur once I am at home I am to contact my physician. I understand and acknowledge receipt of the instructions indicated above.                                                                                                                                            Physician's or R.N.'s Signature                                                                  Date/Time                                                                                                                                              Patient or Representative Signature                                                          Date/Time

## 2017-07-14 NOTE — PROGRESS NOTES
Hematology Oncology Progress Note    Follow up for: Venous sinus thrombosis    Chart notes reviewed since last visit. Case discussed with following:     Patient complains of the following: No complaints    Additional concerns noted by the staff:     Patient Vitals for the past 24 hrs:   BP Temp Pulse Resp SpO2   07/14/17 0736 (!) 150/98 97.8 °F (36.6 °C) 79 16 100 %   07/14/17 0309 (!) 137/99 99.2 °F (37.3 °C) 76 18 100 %   07/13/17 2320 116/78 99 °F (37.2 °C) 78 18 99 %   07/13/17 1900 145/87 99 °F (37.2 °C) 77 15 99 %   07/13/17 1546 (!) 134/92 99.5 °F (37.5 °C) 69 18 100 %   07/13/17 1200 130/90 99 °F (37.2 °C) 75 18 99 %       Review of Systems:  12 point ROS done and negative except as above    Physical Examination:  Gen NAD  CV reg  Lungs clear  abd benign    Labs:  Recent Results (from the past 24 hour(s))   CBC WITH AUTOMATED DIFF    Collection Time: 07/14/17  3:22 AM   Result Value Ref Range    WBC 4.4 4.1 - 11.1 K/uL    RBC 3.96 (L) 4.10 - 5.70 M/uL    HGB 10.6 (L) 12.1 - 17.0 g/dL    HCT 31.8 (L) 36.6 - 50.3 %    MCV 80.3 80.0 - 99.0 FL    MCH 26.8 26.0 - 34.0 PG    MCHC 33.3 30.0 - 36.5 g/dL    RDW 22.1 (H) 11.5 - 14.5 %    PLATELET 128 993 - 627 K/uL    NEUTROPHILS 48 32 - 75 %    LYMPHOCYTES 45 12 - 49 %    MONOCYTES 5 5 - 13 %    EOSINOPHILS 2 0 - 7 %    BASOPHILS 0 0 - 1 %    ABS. NEUTROPHILS 2.1 1.8 - 8.0 K/UL    ABS. LYMPHOCYTES 2.0 0.8 - 3.5 K/UL    ABS. MONOCYTES 0.2 0.0 - 1.0 K/UL    ABS. EOSINOPHILS 0.1 0.0 - 0.4 K/UL    ABS.  BASOPHILS 0.0 0.0 - 0.1 K/UL    RBC COMMENTS ANISOCYTOSIS  2+        WBC COMMENTS TARGET CELLS      DF SMEAR SCANNED     METABOLIC PANEL, BASIC    Collection Time: 07/14/17  3:22 AM   Result Value Ref Range    Sodium 136 136 - 145 mmol/L    Potassium 3.9 3.5 - 5.1 mmol/L    Chloride 100 97 - 108 mmol/L    CO2 26 21 - 32 mmol/L    Anion gap 10 5 - 15 mmol/L    Glucose 95 65 - 100 mg/dL    BUN 3 (L) 6 - 20 MG/DL    Creatinine 0.69 (L) 0.70 - 1.30 MG/DL    BUN/Creatinine ratio 4 (L) 12 - 20      GFR est AA >60 >60 ml/min/1.73m2    GFR est non-AA >60 >60 ml/min/1.73m2    Calcium 9.9 8.5 - 10.1 MG/DL   PROTHROMBIN TIME + INR    Collection Time: 07/14/17  3:22 AM   Result Value Ref Range    INR 1.0 0.9 - 1.1      Prothrombin time 9.6 9.0 - 11.1 sec     Assessment and Plan:   Venous sinus thrombosis  -hypercoag w/u so far shows Antithrombin III level slightly low and homocysteine level slightly high  -await further labs  -Cont lovenox transitioning to coumadin  -will need this for at least 6 months or possibly indefinitely depending on w/u    Please call over the weekend with any questions. If patient still here on Monday will have Dr. Jose Lee f/u. If discharged he should f/u with me in about 3-4 weeks or so to go over labs.

## 2017-07-14 NOTE — PROGRESS NOTES
0100- .  Bedside shift change report given to [facility] (oncoming nurse) by Binh Schmitt (offgoing nurse). Report included the following information SBAR, Kardex, Procedure Summary, Intake/Output, MAR and Cardiac Rhythm NSR.

## 2017-07-14 NOTE — PROGRESS NOTES
Pt to discharge home today and be transport via private vehicle by his spouse. Only needs per MD, were to schedule INR draw for Mon or Tues. Due to pt not being, home bound, HH not an option. SW contacted pt PCP office and arrange an appt for Mon at 10:30AM. Pt and spouse made aware. No additional needs.     SHARIF Trevizo  Ext [phone]

## 2017-07-14 NOTE — PROGRESS NOTES
PCU SHIFT NURSING NOTE      Bedside shift change report given to Adwoa Hernandez RN by [facility]. Report included the following information SBAR, Kardex, ED Summary, Intake/Output, MAR, Recent Results, Med Rec Status and Cardiac Rhythm NSR . Shift Summary:       Admission Date 7/11/2017   Admission Diagnosis Headache  Subdural hematoma ([address]   Consults IP CONSULT TO NEUROSURGERY  IP CONSULT TO NEUROLOGY  IP CONSULT TO HEMATOLOGY        Consults   []PT   []OT   []Speech   []Case Management      [] Palliative      Cardiac Monitoring Order   [x]Yes   []No     IV drips   []Yes    Drip:                            Dose:  Drip:                            Dose:  Drip:                            Dose:   [x]No     GI Prophylaxis   [x]Yes   []No         DVT Prophylaxis   SCDs:  Sequential Compression Device: Bilateral          Ignacio stockings:         [x] Medication   []Contraindicated   []None      Activity Level Activity Level: Up ad ariela, Up with Assistance     Activity Assistance: No assistance needed   Purposeful Rounding every 1-2 hour? [x]Yes   Hernadez Score  Total Score: 2   Bed Alarm (If score 3 or >)   []Yes   [] Refused (See signed refusal form in chart)   Major Score  Major Score: 22   Major Score (if score 14 or less)   []PMT consult   []Wound Care consult      []Specialty bed   [] Nutrition consult          Needs prior to discharge:   Home O2 required:    []Yes   []No    If yes, how much O2 required?     Other:    Last Bowel Movement: Last Bowel Movement Date: 07/13/17      Influenza Vaccine Received Flu Vaccine for Current Season (usually Sept-March): Not Flu Season        Pneumonia Vaccine           Diet Active Orders   Diet    DIET REGULAR      LDAs               Peripheral IV 07/12/17 Left Forearm (Active)   Site Assessment Clean, dry, & intact 7/14/2017  2:15 AM   Phlebitis Assessment 0 7/14/2017  2:15 AM   Infiltration Assessment 0 7/14/2017  2:15 AM   Dressing Status Clean, dry, & intact 7/14/2017 2:15 AM   Dressing Type Tape;Transparent 7/14/2017  2:15 AM   Hub Color/Line Status Pink 7/14/2017  2:15 AM   Alcohol Cap Used Yes 7/13/2017  4:15 PM                      Urinary Catheter      Intake & Output   Date 07/13/17 0700 - 07/14/17 0659 07/14/17 0700 - 07/15/17 0659   Shift 3893-7009 6403-1288 24 Hour Total 6814-3820 3543-6788 24 Hour Total   I  N  T  A  K  E   P.O. 894 55 1446         P. O. 236 86 2387       I.V.  (mL/kg/hr) 200  (0.3)  200  (0.1)         I.V. 200  200       Shift Total  (mL/kg) 1160  (18.6) 60  (1) 1220  (19.6)      O  U  T  P  U  T   Urine  (mL/kg/hr)  250  (0.3) 250  (0.2)         Urine Voided  250 250         Urine Occurrence(s) 4 x  4 x       Shift Total  (mL/kg)  250  (4) 250  (4)      NET 1160 -190 970      Weight (kg) 62.4 62.4 62.4 62.4 62.4 62.4         Readmission Risk Assessment Tool Score Low Risk            7       Total Score        3 Relationship with PCP    4 More than 1 Admission in calendar year        Criteria that do not apply:    Patient Living Status    Patient Length of Stay > 5    Patient Insurance is Medicare, Medicaid or Self Pay    Charlson Comorbidity Score       Expected Length of Stay 4d 12h   Actual Length of Stay 3

## 2017-07-17 ENCOUNTER — CLINICAL SUPPORT (OUTPATIENT)
Dept: FAMILY MEDICINE CLINIC | Age: 37
End: 2017-07-17

## 2017-07-17 ENCOUNTER — PATIENT OUTREACH (OUTPATIENT)
Dept: FAMILY MEDICINE CLINIC | Age: 37
End: 2017-07-17

## 2017-07-17 VITALS
HEIGHT: 70 IN | DIASTOLIC BLOOD PRESSURE: 79 MMHG | WEIGHT: 148 LBS | BODY MASS INDEX: 21.19 KG/M2 | SYSTOLIC BLOOD PRESSURE: 132 MMHG | HEART RATE: 84 BPM | OXYGEN SATURATION: 97 %

## 2017-07-17 DIAGNOSIS — Z79.01 ANTICOAGULATION GOAL OF INR 2 TO 3: ICD-10-CM

## 2017-07-17 DIAGNOSIS — G08 DURAL VENOUS SINUS THROMBOSIS: Primary | ICD-10-CM

## 2017-07-17 DIAGNOSIS — Z51.81 ANTICOAGULATION GOAL OF INR 2 TO 3: ICD-10-CM

## 2017-07-17 LAB
COMMENT, 511217: NORMAL
F5 GENE MUT ANL BLD/T: NORMAL
INR BLD: 1.5
PT POC: 18.5 SECONDS
VALID INTERNAL CONTROL?: YES

## 2017-07-17 RX ORDER — WARFARIN 7.5 MG/1
7.5 TABLET ORAL DAILY
COMMUNITY
End: 2017-07-20 | Stop reason: DRUGHIGH

## 2017-07-17 NOTE — MR AVS SNAPSHOT
Visit Information Date & Time Provider Department Dept. Phone Encounter #  
 7/17/2017 10:30 AM Mica Mendoza [place] [address] [phone] [id number] Upcoming Health Maintenance Date Due Pneumococcal 19-64 Medium Risk (1 of 1 - PPSV23) 9/21/2000 DTaP/Tdap/Td series (1 - Tdap) 9/21/2002 INFLUENZA AGE 9 TO ADULT 8/1/2017 Allergies as of 7/17/2017  Review Complete On: 7/17/2017 By: Demetrius Olivares MD  
  
 Severity Noted Reaction Type Reactions Peanut  07/11/2017    Rash Face swelling, scratchy throat, rash Current Immunizations  Never Reviewed No immunizations on file. Not reviewed this visit You Were Diagnosed With   
  
 Codes Comments Dural venous sinus thrombosis    -  Primary ICD-10-CM: G08 ICD-9-CM: 247 Anticoagulation goal of INR 2 to 3     ICD-10-CM: Z51.81, Z79.01 
ICD-9-CM: V58.83, V58.61 Vitals BP Pulse Height(growth percentile) Weight(growth percentile) SpO2 BMI  
 132/79 (BP 1 Location: Left arm, BP Patient Position: Sitting) 84 5' 10\" (1.778 m) 148 lb (67.1 kg) 97% 21.24 kg/m2 Smoking Status Current Every Day Smoker BMI and BSA Data Body Mass Index Body Surface Area  
 21.24 kg/m 2 1.82 m 2 Preferred Pharmacy Pharmacy Name Phone [facility] [address] [phone] Your Updated Medication List  
  
   
This list is accurate as of: 7/17/17 10:54 AM.  Always use your most recent med list.  
  
  
  
  
 atorvastatin 40 mg tablet Commonly known as:  LIPITOR Take 1 Tab by mouth nightly. COUMADIN 7.5 mg tablet Generic drug:  warfarin Take 7.5 mg by mouth daily. enoxaparin 60 mg/0.6 mL injection Commonly known as:  LOVENOX  
60 mg by SubCUTAneous route every twelve (12) hours every twelve (12) hours.  Stop using this medication once you INR is 2.0 or greater (Goal INR is 2.0 to 3.0)  Indications: dural sinus thrombosis  
  
 esomeprazole 20 mg capsule Commonly known as:  NexIUM Take 1 Cap by mouth daily. ferrous sulfate 325 mg (65 mg iron) tablet Take 1 Tab by mouth daily (with breakfast). ondansetron hcl 4 mg tablet Commonly known as:  ZOFRAN (AS HYDROCHLORIDE) Take 1 Tab by mouth every eight (8) hours as needed for Nausea. July 2017 Details Sun Mon Tue Wed Thu Fri Sat  
        1  
  
  
  
  
  2  
  
  
  
   3  
  
  
  
   4  
  
  
  
   5  
  
  
  
   6  
  
  
  
   7  
  
  
  
   8  
  
  
  
  
  9  
  
  
  
   10  
  
  
  
   11  
  
  
  
   12  
  
  
  
   13  
  
  
  
   14  
  
  
  
   15  
  
  
  
  
  16  
  
  
  
   17  
  
7.5 mg  
See details 18  
  
7.5 mg  
  
   19  
  
7.5 mg  
  
   20  
  
7.5 mg  
  
   21  
  
  
  
   22  
  
  
  
  
  23  
  
  
  
   24  
  
  
  
   25  
  
  
  
   26  
  
  
  
   27  
  
  
  
   28  
  
  
  
   29  
  
  
  
  
  30  
  
  
  
   31  
  
  
  
       
 Date Details 07/17 This INR check INR: 1.5 Date of next INR:  7/20/2017 How to take your warfarin dose To take:  7.5 mg Take one of the 7.5 mg tablets. We Performed the Following AMB POC PT/INR [25704 CPT(R)] Patient Instructions Taking Warfarin Safely: Care Instructions Your Care Instructions Warfarin is a medicine that you take to prevent blood clots. It is often called a blood thinner. Doctors give warfarin (such as Coumadin) to reduce the risk of blood clots. You may be at risk for blood clots if you have atrial fibrillation or deep vein thrombosis. Some other health problems may also put you at risk. Warfarin slows the amount of time it takes for your blood to clot. It can cause bleeding problems. Even if you've been taking warfarin for a while, it's important to know how to take it safely. Foods and other medicines can affect the way warfarin works. Some can make warfarin work too well. This can cause bleeding problems. And some can make it work poorly, so that it does not prevent blood clots very well. You will need regular blood tests to check how long it takes for your blood to form a clot. This test is called a PT or prothrombin time test. The result of the test is called an INR level. Depending on the test results, your doctor or anticoagulation clinic may adjust your dose of warfarin. Follow-up care is a key part of your treatment and safety. Be sure to make and go to all appointments, and call your doctor if you are having problems. It's also a good idea to know your test results and keep a list of the medicines you take. How can you care for yourself at home? Take warfarin safely · Take your warfarin at the same time each day. · If you miss a dose of warfarin, don't take an extra dose to make up for it. Your doctor can tell you exactly what to do so you don't take too much or too little. · Wear medical alert jewelry that lets others know that you take warfarin. You can buy this at most drugstores. · Don't take warfarin if you are pregnant or planning to get pregnant. Talk to your doctor about how you can prevent getting pregnant while you are taking it. · Don't change your dose or stop taking warfarin unless your doctor tells you to. Effects of medicines and food on warfarin · Don't start or stop taking any medicines, vitamins, or natural remedies unless you first talk to your doctor. Many medicines can affect how warfarin works. These include aspirin and other pain relievers, over-the-counter medicines, multivitamins, dietary supplements, and herbal products. · Tell all of your doctors and pharmacists that you take warfarin. Some prescription medicines can affect how warfarin works.  
· Keep the amount of vitamin K in your diet about the same from day to day. Do not suddenly eat a lot more or a lot less food that is rich in vitamin K than you usually do. Vitamin K affects how warfarin works and how your blood clots. Talk with your doctor before making big changes in your diet. Foods that have a lot of vitamin K include cooked green vegetables, such as: ¨ Kale, spinach, turnip greens, rosario greens, Swiss chard, and mustard greens. ¨ Campbell sprouts, broccoli, and asparagus. · Limit your use of alcohol. Avoid bleeding by preventing falls and injuries · Wear slippers or shoes with nonskid soles. · Remove throw rugs and clutter. · Rearrange furniture and electrical cords to keep them out of walking paths. · Keep stairways, porches, and outside walkways well lit. Use night-lights in hallways and bathrooms. · Be extra careful when you work with sharp tools or knives. When should you call for help? Call 911 anytime you think you may need emergency care. For example, call if: 
· You have a sudden, severe headache that is different from past headaches. Call your doctor now or seek immediate medical care if: 
· You have any abnormal bleeding, such as: 
¨ Nosebleeds. ¨ Vaginal bleeding that is different (heavier, more frequent, at a different time of the month) than what you are used to. ¨ Bloody or black stools, or rectal bleeding. ¨ Bloody or pink urine. Watch closely for changes in your health, and be sure to contact your doctor if you have any problems. Where can you learn more? Go to http://carli-aidan.info/. Enter O930 in the search box to learn more about \"Taking Warfarin Safely: Care Instructions. \" Current as of: November 15, 2016 Content Version: 11.3 © 5594-7962 [employer]. Care instructions adapted under license by [employer] (which disclaims liability or warranty for this information).  If you have questions about a medical condition or this instruction, always ask your healthcare professional. [place] [address] any warranty or liability for your use of this information. Warfarin (Coumadin, Jantoven) - (By mouth) Why this medicine is used:  
Prevents and treats blood clots. Contact a nurse or doctor right away if you have: 
· Sudden or severe headache · Red or dark brown urine, or red or black, tarry stools · Bloody vomit or vomit that looks like coffee grounds · Bleeding that does not stop or bruises that do not heal 
· Purplish red, net-like, blotchy spots on the skin Common side effects: · Minor bleeding or bruising © 2017 [facility] Information is for End User's use only and may not be sold, redistributed or otherwise used for commercial purposes. Introducing [facility]! [facility] introduces [employer] patient portal. Now you can access parts of your medical record, email your doctor's office, and request medication refills online. 1. In your internet browser, go to [email or web address] 2. Click on the First Time User? Click Here link in the Sign In box. You will see the New Member Sign Up page. 3. Enter your [employer] Access Code exactly as it appears below. You will not need to use this code after youve completed the sign-up process. If you do not sign up before the expiration date, you must request a new code. · [employer] Access Code: [email or web address] Expires: 10/3/2017  4:03 PM 
 
4. Enter the last four digits of your Social Security Number (xxxx) and Date of Birth (mm/dd/yyyy) as indicated and click Submit. You will be taken to the next sign-up page. 5. Create a [employer] ID. This will be your [employer] login ID and cannot be changed, so think of one that is secure and easy to remember. 6. Create a [employer] password. You can change your password at any time. 7. Enter your Password Reset Question and Answer.  This can be used at a later time if you forget your password. 8. Enter your e-mail address. You will receive e-mail notification when new information is available in [address] 9. Click Sign Up. You can now view and download portions of your medical record. 10. Click the Download Summary menu link to download a portable copy of your medical information. If you have questions, please visit the Frequently Asked Questions section of the "[employer]" website. Remember, "[employer]" is NOT to be used for urgent needs. For medical emergencies, dial 911. Now available from your iPhone and Android! Please provide this summary of care documentation to your next provider. Your primary care clinician is listed as ALESHIA JERRY. If you have any questions after today's visit, please call [phone].

## 2017-07-17 NOTE — PROGRESS NOTES
HPI  Brittni Medina is a 29 y.o. male who presents for Coumadin check. He was hospitalized 7/11-7/14 for a dural venous sinus thrombosis. Briefly reviewed the hospital record and see that he was started on Lovenox bridging to Coumadin. The Coumadin was started at 7.5 mg daily. This was started on Thursday 7/13. He is on day 5 of the Coumadin today and his INR is 1.5    PMHx:  Past Medical History:   Diagnosis Date    Bleeding hemorrhoid     Elevated cholesterol     Hypertension     Pancreatitis        Meds:   Current Outpatient Prescriptions   Medication Sig Dispense Refill    warfarin (COUMADIN) 7.5 mg tablet Take 7.5 mg by mouth daily.  atorvastatin (LIPITOR) 40 mg tablet Take 1 Tab by mouth nightly. 30 Tab 0    enoxaparin (LOVENOX) 60 mg/0.6 mL injection 60 mg by SubCUTAneous route every twelve (12) hours every twelve (12) hours. Stop using this medication once you INR is 2.0 or greater (Goal INR is 2.0 to 3.0)  Indications: dural sinus thrombosis 23 Syringe 0    ferrous sulfate 325 mg (65 mg iron) tablet Take 1 Tab by mouth daily (with breakfast). 30 Tab 0    ondansetron hcl (ZOFRAN, AS HYDROCHLORIDE,) 4 mg tablet Take 1 Tab by mouth every eight (8) hours as needed for Nausea. 30 Tab 0    esomeprazole (NEXIUM) 20 mg capsule Take 1 Cap by mouth daily. 30 Cap 6       Allergies: Allergies   Allergen Reactions    Peanut Rash     Face swelling, scratchy throat, rash       Smoker:  History   Smoking Status    Current Every Day Smoker    Packs/day: 0.25   Smokeless Tobacco    Current User       ETOH:   History   Alcohol Use    Yes     Comment: one beer per day       FH:   Family History   Problem Relation Age of Onset    Hypertension Mother     Breast Cancer Mother     Hypertension Father     Heart Attack Father     No Known Problems Sister        ROS:   As listed in HPI.  In addition:  Constitutional:   No headache, fever, fatigue, weight loss or weight gain      Cardiac:    No chest pain Resp:   No cough or shortness of breath      Neuro   No loss of consciousness, dizziness, seizures      Physical Exam:  Blood pressure 132/79, pulse 84, height 5' 10\" (1.778 m), weight 148 lb (67.1 kg), SpO2 97 %. GEN: No apparent distress. Alert and oriented and responds to all questions appropriately. NEUROLOGIC:  No focal neurologic deficits. Strength and sensation grossly intact. Coordination and gait grossly intact. EXT: Well perfused. No edema. SKIN: No obvious rashes. Assessment and Plan     Coumadin check  Continue Coumadin 7.5 mg daily  INR today 1.5  Recheck on Thursday-that will be 1 week on Coumadin  INR goal 2-3    Hospital follow-up on Thursday for transition of care    Duration of Coumadin therapy according to the heme-onc note would be at least 6 months or longer depending on the workup. He was instructed to follow-up 3-4 weeks with heme-onc to discuss the lab results. Should will follow-up with neuro in 2 weeks      ICD-10-CM ICD-9-CM    1. Dural venous sinus thrombosis G08 325 AMB POC PT/INR   2. Anticoagulation goal of INR 2 to 3 Z51.81 V58.83 AMB POC PT/INR    Z79.01 V58.61        AVS given.  Pt expressed understanding of instructions

## 2017-07-17 NOTE — PROGRESS NOTES
Patient presents for a PT/INR  See med list and patient instructions along with results for verbal orders by Dr. Nan Ladd. Results for orders placed or performed in visit on 07/17/17   AMB POC PT/INR   Result Value Ref Range    VALID INTERNAL CONTROL POC Yes     Prothrombin time (POC) 18.5 seconds    INR POC 1.5      Vitals:    07/17/17 1035   BP: 132/79   Pulse: 84   SpO2: 97%   Weight: 148 lb (67.1 kg)   Height: 5' 10\" (1.778 m)     Prior to Admission medications    Medication Sig Start Date End Date Taking? Authorizing Provider   warfarin (COUMADIN) 7.5 mg tablet Take 7.5 mg by mouth daily. Yes Historical Provider   atorvastatin (LIPITOR) 40 mg tablet Take 1 Tab by mouth nightly. 7/14/17  Yes Silas Porras MD   enoxaparin (LOVENOX) 60 mg/0.6 mL injection 60 mg by SubCUTAneous route every twelve (12) hours every twelve (12) hours. Stop using this medication once you INR is 2.0 or greater (Goal INR is 2.0 to 3.0)  Indications: dural sinus thrombosis 7/14/17  Yes Silas Porras MD   ferrous sulfate 325 mg (65 mg iron) tablet Take 1 Tab by mouth daily (with breakfast). 7/14/17  Yes Silas Porras MD   ondansetron hcl (ZOFRAN, AS HYDROCHLORIDE,) 4 mg tablet Take 1 Tab by mouth every eight (8) hours as needed for Nausea. 7/14/17  Yes Silas Porras MD   esomeprazole (NEXIUM) 20 mg capsule Take 1 Cap by mouth daily. 10/20/16  Yes [address], NP     See anti-coag episode for instructions. AVS and verbal instructions given he voiced understanding of all instructions.

## 2017-07-17 NOTE — PATIENT INSTRUCTIONS
Taking Warfarin Safely: Care Instructions  Your Care Instructions  Warfarin is a medicine that you take to prevent blood clots. It is often called a blood thinner. Doctors give warfarin (such as Coumadin) to reduce the risk of blood clots. You may be at risk for blood clots if you have atrial fibrillation or deep vein thrombosis. Some other health problems may also put you at risk. Warfarin slows the amount of time it takes for your blood to clot. It can cause bleeding problems. Even if you've been taking warfarin for a while, it's important to know how to take it safely. Foods and other medicines can affect the way warfarin works. Some can make warfarin work too well. This can cause bleeding problems. And some can make it work poorly, so that it does not prevent blood clots very well. You will need regular blood tests to check how long it takes for your blood to form a clot. This test is called a PT or prothrombin time test. The result of the test is called an INR level. Depending on the test results, your doctor or anticoagulation clinic may adjust your dose of warfarin. Follow-up care is a key part of your treatment and safety. Be sure to make and go to all appointments, and call your doctor if you are having problems. It's also a good idea to know your test results and keep a list of the medicines you take. How can you care for yourself at home? Take warfarin safely  · Take your warfarin at the same time each day. · If you miss a dose of warfarin, don't take an extra dose to make up for it. Your doctor can tell you exactly what to do so you don't take too much or too little. · Wear medical alert jewelry that lets others know that you take warfarin. You can buy this at most drugstores. · Don't take warfarin if you are pregnant or planning to get pregnant. Talk to your doctor about how you can prevent getting pregnant while you are taking it.   · Don't change your dose or stop taking warfarin unless your doctor tells you to. Effects of medicines and food on warfarin  · Don't start or stop taking any medicines, vitamins, or natural remedies unless you first talk to your doctor. Many medicines can affect how warfarin works. These include aspirin and other pain relievers, over-the-counter medicines, multivitamins, dietary supplements, and herbal products. · Tell all of your doctors and pharmacists that you take warfarin. Some prescription medicines can affect how warfarin works. · Keep the amount of vitamin K in your diet about the same from day to day. Do not suddenly eat a lot more or a lot less food that is rich in vitamin K than you usually do. Vitamin K affects how warfarin works and how your blood clots. Talk with your doctor before making big changes in your diet. Foods that have a lot of vitamin K include cooked green vegetables, such as:  ¨ Kale, spinach, turnip greens, rosario greens, Swiss chard, and mustard greens. ¨ Guin sprouts, broccoli, and asparagus. · Limit your use of alcohol. Avoid bleeding by preventing falls and injuries  · Wear slippers or shoes with nonskid soles. · Remove throw rugs and clutter. · Rearrange furniture and electrical cords to keep them out of walking paths. · Keep stairways, porches, and outside walkways well lit. Use night-lights in hallways and bathrooms. · Be extra careful when you work with sharp tools or knives. When should you call for help? Call 911 anytime you think you may need emergency care. For example, call if:  · You have a sudden, severe headache that is different from past headaches. Call your doctor now or seek immediate medical care if:  · You have any abnormal bleeding, such as:  ¨ Nosebleeds. ¨ Vaginal bleeding that is different (heavier, more frequent, at a different time of the month) than what you are used to. ¨ Bloody or black stools, or rectal bleeding. ¨ Bloody or pink urine.   Watch closely for changes in your health, and be sure to contact your doctor if you have any problems. Where can you learn more? Go to http://carli-aidan.info/. Enter I598 in the search box to learn more about \"Taking Warfarin Safely: Care Instructions. \"  Current as of: November 15, 2016  Content Version: 11.3  © 0070-6860 [employer]. Care instructions adapted under license by [employer] (which disclaims liability or warranty for this information). If you have questions about a medical condition or this instruction, always ask your healthcare professional. [address] any warranty or liability for your use of this information. Warfarin (Coumadin, Jantoven) - (By mouth)   Why this medicine is used:   Prevents and treats blood clots. Contact a nurse or doctor right away if you have:  · Sudden or severe headache  · Red or dark brown urine, or red or black, tarry stools  · Bloody vomit or vomit that looks like coffee grounds  · Bleeding that does not stop or bruises that do not heal  · Purplish red, net-like, blotchy spots on the skin     Common side effects:  · Minor bleeding or bruising  © 2017 [address] is for End User's use only and may not be sold, redistributed or otherwise used for commercial purposes.

## 2017-07-17 NOTE — PROGRESS NOTES
[address] [facility] [address] Discharge [facility] Follow-Up Patient did not show on any reports but navigator was notified of admission to [address] [place] [address] by [facility] clinical staff. It was advised patient would benefit from  outreach. Patient was admitted to [address] [place] [address] from 7/11/17-7/14/17.         RRAT score: low risk     Diagnosis: Dural Venous thrombosis       Procedure:MRI brain mrv wo contrast        Discharge Instructions/Plans: Patient discharged home lovenox coumadin bridge. Patient administers lovenox to himself. He states he feels comfortable with this procedure. He presented to office this am for PT/INR with was  18.5/1.5 with lovenox injection every 12 hours and 7.5mg daily of coumadin. Patient scheduled to have INR recheck and follow up with Dr Ynes Magana on 7/20/17. wife to contact office with other follow up appointments that have been scheduled. [facility] post hospital interactive contact done by telephone within 2 business days of discharge      Goals      Attends follow-up appointments as directed.  Returns to baseline activity level.

## 2017-07-18 ENCOUNTER — TELEPHONE (OUTPATIENT)
Dept: FAMILY MEDICINE CLINIC | Age: 37
End: 2017-07-18

## 2017-07-19 LAB
ADDITIONAL INFORMATION 480297: NORMAL
F2 GENE MUT ANL BLD/T: NORMAL

## 2017-07-20 ENCOUNTER — OFFICE VISIT (OUTPATIENT)
Dept: FAMILY MEDICINE CLINIC | Age: 37
End: 2017-07-20

## 2017-07-20 VITALS
DIASTOLIC BLOOD PRESSURE: 67 MMHG | WEIGHT: 142 LBS | RESPIRATION RATE: 14 BRPM | HEIGHT: 70 IN | BODY MASS INDEX: 20.33 KG/M2 | HEART RATE: 80 BPM | OXYGEN SATURATION: 95 % | SYSTOLIC BLOOD PRESSURE: 105 MMHG | TEMPERATURE: 98 F

## 2017-07-20 DIAGNOSIS — G08 DURAL VENOUS SINUS THROMBOSIS: Primary | ICD-10-CM

## 2017-07-20 DIAGNOSIS — G47.00 INSOMNIA, UNSPECIFIED TYPE: ICD-10-CM

## 2017-07-20 DIAGNOSIS — Z79.01 ANTICOAGULATION GOAL OF INR 2 TO 3: ICD-10-CM

## 2017-07-20 DIAGNOSIS — Z51.81 ANTICOAGULATION GOAL OF INR 2 TO 3: ICD-10-CM

## 2017-07-20 DIAGNOSIS — F10.20 UNCOMPLICATED ALCOHOL DEPENDENCE (HCC): ICD-10-CM

## 2017-07-20 LAB
INR BLD: 2.4
PT POC: 28.5 SECONDS
VALID INTERNAL CONTROL?: YES

## 2017-07-20 RX ORDER — WARFARIN SODIUM 5 MG/1
5 TABLET ORAL DAILY
Qty: 30 TAB | Refills: 5 | Status: SHIPPED | OUTPATIENT
Start: 2017-07-20 | End: 2017-08-17 | Stop reason: SDUPTHER

## 2017-07-20 NOTE — PROGRESS NOTES
CECILIA Valencia is a 29 y.o. male who presents for transition of care from hospital.  He was admitted 7/11-7/14. Patient out reach was completed on 7/17. Reason for admission was headache that on imaging was found to be a dural venous sinus thrombosis. Had been having a headache for a few months at that point although he reported it was only a month upon ED presentation. He does not remember much of the day where his friends brought him to the emergency room because his mother was in a car accident and he says everything was a bit of a blur. He does not recall if the headache was made worse by the stress but he thinks it must have been because it was worrying the people that he was with. he has not had a headache since starting on the anticoagulation. He was seen by hematology and neurology while in the hospital.  Hematology suggested that he would need to be on anticoagulation for 6 months or longer depending on the hematology workup. Neurology also wanted to see him as an outpatient. He was discharged on Lovenox bridging to Coumadin    Today he feels pretty good. He has been tolerating the Lovenox injections. Taking the Coumadin without difficulty. Evidently he remembers the Coumadin education that he received and knows a little bit about it because his mother was on Coumadin. Was a little bit difficult to get a history from him. He had a somewhat nonlinear approach to giving a history. We ended up talking about how he does not sleep very well. Apparently ever since he was very small he has had difficulty getting to sleep and staying asleep. Says that he started drinking alcohol to try and help himself sleep. Says that he started drinking when he was 10years old and I am not sure that he was joking. He describes his sleep history is laying in bed awake for several hours. When he does drift off to sleep he wakes up at the slightest sound.   Some weeks he can go 5 days in a row without sleeping at all and feels like he has a lot of energy. A lot of times these weeks are followed by periods of lassitude and staying in bed all day. Denies any extravagant thoughts during the periods of insomnia. Says that he does great at work during these weeks and does not feel lack of energy at all    PMHx:  Past Medical History:   Diagnosis Date    Bleeding hemorrhoid     Elevated cholesterol     Hypertension     Pancreatitis        Meds:   Current Outpatient Prescriptions   Medication Sig Dispense Refill    warfarin (COUMADIN) 5 mg tablet Take 1 Tab by mouth daily. 30 Tab 5    atorvastatin (LIPITOR) 40 mg tablet Take 1 Tab by mouth nightly. 30 Tab 0    ferrous sulfate 325 mg (65 mg iron) tablet Take 1 Tab by mouth daily (with breakfast). 30 Tab 0    ondansetron hcl (ZOFRAN, AS HYDROCHLORIDE,) 4 mg tablet Take 1 Tab by mouth every eight (8) hours as needed for Nausea. 30 Tab 0    esomeprazole (NEXIUM) 20 mg capsule Take 1 Cap by mouth daily. 30 Cap 6       Allergies: Allergies   Allergen Reactions    Peanut Rash     Face swelling, scratchy throat, rash       Smoker:  History   Smoking Status    Current Every Day Smoker    Packs/day: 0.25   Smokeless Tobacco    Current User       ETOH:   History   Alcohol Use    Yes     Comment: one beer per day       FH:   Family History   Problem Relation Age of Onset    Hypertension Mother     Breast Cancer Mother     Hypertension Father     Heart Attack Father     No Known Problems Sister        ROS:   As listed in HPI. In addition:  Constitutional:   No headache, fever, fatigue, weight loss or weight gain      Cardiac:    No chest pain      Resp:   No cough or shortness of breath      Neuro   No loss of consciousness, dizziness, seizures      Physical Exam:  Blood pressure 105/67, pulse 80, temperature 98 °F (36.7 °C), resp. rate 14, height 5' 10\" (1.778 m), weight 142 lb (64.4 kg), SpO2 95 %. GEN: No apparent distress.  Alert and oriented and responds to all questions appropriately. NEUROLOGIC:  No focal neurologic deficits. Strength and sensation grossly intact. Coordination and gait grossly intact. EXT: Well perfused. No edema. SKIN: No obvious rashes. Lungs clear to auscultation bilaterally  CV regular rate and rhythm no murmur no bruit       Assessment and Plan     Dural venous sinus thrombosis  INR 2.4 today  Stop Lovenox  Currently taking Coumadin 7.5 mg daily, back this off to a more reasonable 5 mg daily and recheck in 1 week. He has a Coumadin meter that his mother used. Wonders if he could use this. Is also interested in looking into getting his own. He is willing to follow-up every week until stable and then back off. If he would like to check his own Coumadin I think this would be fine. Alcohol dependence  Sixpack a day plus a few shots  Asked him to back off, among other things is not going to help his sleep  History of pancreatitis in 2014    Nicotine dependence  Continues to smoke, encouraged to quit    Insomnia  History gave me today sounded surprisingly like bipolar. Might be having episodes of hypomania. Unfortunately he was not very specific or consistent in his history and it looks like it is completely different than the history he gave to sleep specialists earlier this month. Understand that he will be getting his home sleep kit next week. He wants to do the full in-house test.    Follow-up in 1 week for nurse visit  Follow-up with neurology in 2 weeks  Follow-up with hematology oncology in 3-4 weeks  Provided with numbers for these referrals      ICD-10-CM ICD-9-CM    1. Dural venous sinus thrombosis G08 325 REFERRAL TO HEMATOLOGY ONCOLOGY      REFERRAL TO NEUROLOGY      warfarin (COUMADIN) 5 mg tablet      AMB POC PT/INR   2. Uncomplicated alcohol dependence ([facility] [address] F10.20 303.90    3. Insomnia, unspecified type G47.00 780.52    4. Anticoagulation goal of INR 2 to 3 Z51.81 V58.83     Z79.01 V58.61        AVS given.  Pt expressed understanding of instructions

## 2017-07-20 NOTE — MR AVS SNAPSHOT
Visit Information Date & Time Provider Department Dept. Phone Encounter #  
 7/20/2017 10:20 AM Demetrius Olivares MD [address] [facility] [phone] [id number] Upcoming Health Maintenance Date Due Pneumococcal 19-64 Medium Risk (1 of 1 - PPSV23) 9/21/2000 DTaP/Tdap/Td series (1 - Tdap) 9/21/2002 INFLUENZA AGE 9 TO ADULT 8/1/2017 Allergies as of 7/20/2017  Review Complete On: 7/17/2017 By: Demetrius Olivares MD  
  
 Severity Noted Reaction Type Reactions Peanut  07/11/2017    Rash Face swelling, scratchy throat, rash Current Immunizations  Never Reviewed No immunizations on file. Not reviewed this visit You Were Diagnosed With   
  
 Codes Comments Dural venous sinus thrombosis    -  Primary ICD-10-CM: G08 ICD-9-CM: 366 Uncomplicated alcohol dependence ([facility]     ICD-10-CM: F10.20 ICD-9-CM: 303.90 Insomnia, unspecified type     ICD-10-CM: G47.00 ICD-9-CM: 780.52 Anticoagulation goal of INR 2 to 3     ICD-10-CM: Z51.81, Z79.01 
ICD-9-CM: V58.83, V58.61 Vitals BP Pulse Temp Resp Height(growth percentile) Weight(growth percentile) 105/67 (BP 1 Location: Left arm, BP Patient Position: Sitting) 80 98 °F (36.7 °C) 14 5' 10\" (1.778 m) 142 lb (64.4 kg) SpO2 BMI Smoking Status 95% 20.37 kg/m2 Current Every Day Smoker BMI and BSA Data Body Mass Index Body Surface Area  
 20.37 kg/m 2 1.78 m 2 Preferred Pharmacy Pharmacy Name Phone [facility] [phone] Your Updated Medication List  
  
   
This list is accurate as of: 7/20/17 11:32 AM.  Always use your most recent med list.  
  
  
  
  
 atorvastatin 40 mg tablet Commonly known as:  LIPITOR Take 1 Tab by mouth nightly. esomeprazole 20 mg capsule Commonly known as:  NexIUM Take 1 Cap by mouth daily. ferrous sulfate 325 mg (65 mg iron) tablet Take 1 Tab by mouth daily (with breakfast). ondansetron hcl 4 mg tablet Commonly known as:  ZOFRAN (AS HYDROCHLORIDE) Take 1 Tab by mouth every eight (8) hours as needed for Nausea. warfarin 5 mg tablet Commonly known as:  COUMADIN Take 1 Tab by mouth daily. Prescriptions Sent to Pharmacy Refills  
 warfarin (COUMADIN) 5 mg tablet 5 Sig: Take 1 Tab by mouth daily. Class: Normal  
 Pharmacy: [address] #: [phone] Route: Oral  
  
July 2017 Details Sun Mon Tue Wed Thu Fri Sat  
        1  
  
  
  
  
  2  
  
  
  
   3  
  
  
  
   4  
  
  
  
   5  
  
  
  
   6  
  
  
  
   7  
  
  
  
   8  
  
  
  
  
  9  
  
  
  
   10  
  
  
  
   11  
  
  
  
   12  
  
  
  
   13  
  
  
  
   14  
  
  
  
   15  
  
  
  
  
  16  
  
  
  
   17  
  
  
  
   18  
  
  
  
   19  
  
  
  
   20  
  
5 mg See details 21  
  
5 mg  
  
   22  
  
5 mg  
  
  
  23  
  
5 mg  
  
   24  
  
5 mg  
  
   25  
  
5 mg  
  
   26  
  
5 mg  
  
   27  
  
5 mg  
  
   28  
  
  
  
   29  
  
  
  
  
  30  
  
  
  
   31  
  
  
  
       
 Date Details 07/20 This INR check INR: 2.4 Date of next INR:  7/27/2017 How to take your warfarin dose To take:  5 mg Take one of the 5 mg tablets. We Performed the Following AMB POC PT/INR [30939 CPT(R)] REFERRAL TO HEMATOLOGY ONCOLOGY [XAS05 Custom] REFERRAL TO NEUROLOGY [HCQ36 Custom] Referral Information Referral ID Referred By Referred To  
  
 [id number] [place], [address] Visits Status Start Date End Date 1 New Request 7/20/17 7/20/18 If your referral has a status of pending review or denied, additional information will be sent to support the outcome of this decision. Referral ID Referred By Referred To  
 [id number] Afshin BEST MD  
   [address] [facility] [address] [place], [address] [place] [address] Phone: [phone] Fax: [phone] Visits Status Start Date End Date 1 New Request 7/20/17 7/20/18 If your referral has a status of pending review or denied, additional information will be sent to support the outcome of this decision. Introducing [facility]! Trang Garrett introduces [employer] patient portal. Now you can access parts of your medical record, email your doctor's office, and request medication refills online. 1. In your internet browser, go to [email or web address] 2. Click on the First Time User? Click Here link in the Sign In box. You will see the New Member Sign Up page. 3. Enter your [employer] Access Code exactly as it appears below. You will not need to use this code after youve completed the sign-up process. If you do not sign up before the expiration date, you must request a new code. · [employer] Access Code: [email or web address] Expires: 10/3/2017  4:03 PM 
 
4. Enter the last four digits of your Social Security Number (xxxx) and Date of Birth (mm/dd/yyyy) as indicated and click Submit. You will be taken to the next sign-up page. 5. Create a [employer] ID. This will be your [employer] login ID and cannot be changed, so think of one that is secure and easy to remember. 6. Create a [employer] password. You can change your password at any time. 7. Enter your Password Reset Question and Answer. This can be used at a later time if you forget your password. 8. Enter your e-mail address. You will receive e-mail notification when new information is available in [address] 9. Click Sign Up. You can now view and download portions of your medical record. 10. Click the Download Summary menu link to download a portable copy of your medical information. If you have questions, please visit the Frequently Asked Questions section of the [employer] website. Remember, [employer] is NOT to be used for urgent needs. For medical emergencies, dial 911. Now available from your iPhone and Android! Please provide this summary of care documentation to your next provider. Your primary care clinician is listed as ALESHIA JERRY. If you have any questions after today's visit, please call [phone].

## 2017-07-25 DIAGNOSIS — Z87.19 HISTORY OF PANCREATITIS: ICD-10-CM

## 2017-07-25 DIAGNOSIS — K21.9 GASTROESOPHAGEAL REFLUX DISEASE WITHOUT ESOPHAGITIS: ICD-10-CM

## 2017-07-25 RX ORDER — HYDROGEN PEROXIDE 3 %
SOLUTION, NON-ORAL MISCELLANEOUS
Qty: 30 CAP | Refills: 6 | Status: SHIPPED | OUTPATIENT
Start: 2017-07-25 | End: 2017-12-20 | Stop reason: ALTCHOICE

## 2017-07-27 ENCOUNTER — CLINICAL SUPPORT (OUTPATIENT)
Dept: FAMILY MEDICINE CLINIC | Age: 37
End: 2017-07-27

## 2017-07-27 VITALS
DIASTOLIC BLOOD PRESSURE: 98 MMHG | HEIGHT: 70 IN | TEMPERATURE: 98.4 F | BODY MASS INDEX: 20.47 KG/M2 | SYSTOLIC BLOOD PRESSURE: 142 MMHG | HEART RATE: 82 BPM | OXYGEN SATURATION: 98 % | WEIGHT: 143 LBS | RESPIRATION RATE: 16 BRPM

## 2017-07-27 DIAGNOSIS — Z51.81 ANTICOAGULATION GOAL OF INR 2 TO 3: ICD-10-CM

## 2017-07-27 DIAGNOSIS — G08 DURAL VENOUS SINUS THROMBOSIS: Primary | ICD-10-CM

## 2017-07-27 DIAGNOSIS — R53.83 FATIGUE, UNSPECIFIED TYPE: ICD-10-CM

## 2017-07-27 DIAGNOSIS — Z79.01 ANTICOAGULATION GOAL OF INR 2 TO 3: ICD-10-CM

## 2017-07-27 LAB
INR BLD: 4.1
PT POC: 48.6 SECONDS
VALID INTERNAL CONTROL?: YES

## 2017-07-27 NOTE — PROGRESS NOTES
HPI  Valentine Henao is a 29 y.o. male who presents for an INR check but is feeling under the weather today. For the last 2 days he has been feeling tired, legs have been rubbery. Just wants to lie down. This is unusual for him. Also feels thirsty. Denies fever, congestion, although he does have a new. Does not have any other specific symptoms that he can think of    PMHx:  Past Medical History:   Diagnosis Date    Bleeding hemorrhoid     Elevated cholesterol     Hypertension     Pancreatitis        Meds:   Current Outpatient Prescriptions   Medication Sig Dispense Refill    esomeprazole (NEXIUM) 20 mg capsule TAKE 1 CAPSULE BY MOUTH DAILY FOR REFLUX 30 Cap 6    warfarin (COUMADIN) 5 mg tablet Take 1 Tab by mouth daily. 30 Tab 5    atorvastatin (LIPITOR) 40 mg tablet Take 1 Tab by mouth nightly. 30 Tab 0    ferrous sulfate 325 mg (65 mg iron) tablet Take 1 Tab by mouth daily (with breakfast). 30 Tab 0    ondansetron hcl (ZOFRAN, AS HYDROCHLORIDE,) 4 mg tablet Take 1 Tab by mouth every eight (8) hours as needed for Nausea. 30 Tab 0    prothrombin time/INR test metr misc 1 Each by Does Not Apply route every seven (7) days. Used to check INR weekly 1 Kit 0       Allergies: Allergies   Allergen Reactions    Peanut Rash     Face swelling, scratchy throat, rash       Smoker:  History   Smoking Status    Current Every Day Smoker    Packs/day: 0.25   Smokeless Tobacco    Current User       ETOH:   History   Alcohol Use    Yes     Comment: one beer per day       FH:   Family History   Problem Relation Age of Onset    Hypertension Mother     Breast Cancer Mother     Hypertension Father     Heart Attack Father     No Known Problems Sister        ROS:   As listed in HPI.  In addition:  Constitutional:   No headache, fever, fatigue, weight loss or weight gain      Cardiac:    No chest pain      Resp:   No cough or shortness of breath      Neuro   No loss of consciousness, dizziness, seizures Physical Exam:  Blood pressure (!) 142/98, pulse 82, temperature 98.4 °F (36.9 °C), resp. rate 16, height 5' 10\" (1.778 m), weight 143 lb (64.9 kg), SpO2 98 %. GEN: No apparent distress. Alert and oriented and responds to all questions appropriately. NEUROLOGIC:  No focal neurologic deficits. Strength and sensation grossly intact. Coordination and gait grossly intact. EXT: Well perfused. No edema. SKIN: No obvious rashes. HEENT, mild allergic rhinitis, postnasal drip, shotty lymph nodes  Lungs clear to auscultation bilaterally  CV regular rate and rhythm no murmur no bruit       Assessment and Plan     Feeling under the weather with a mild cough and nasal congestion. Might be an early cold but he has had a eventful recent medical history. Will check a CBC and orthostatic vital signs. The orthostatic vital signs are normal.  INR is a little bit high    Currently taking Coumadin 5 mg daily  INR 4.0  Start taking Coumadin 2.5 mg 4 days out of the week and 5 mg 3 days out of the week      ICD-10-CM ICD-9-CM    1. Dural venous sinus thrombosis G08 325 AMB POC PT/INR   2. Anticoagulation goal of INR 2 to 3 Z51.81 V58.83 AMB POC PT/INR    Z79.01 V58.61    3. Fatigue, unspecified type R53.83 780.79 CBC WITH AUTOMATED DIFF       AVS given.  Pt expressed understanding of instructions

## 2017-07-27 NOTE — MR AVS SNAPSHOT
Visit Information Date & Time Provider Department Dept. Phone Encounter #  
 7/27/2017 10:00 AM Mica Mendoza [place] [address] [phone] [id number] Upcoming Health Maintenance Date Due Pneumococcal 19-64 Medium Risk (1 of 1 - PPSV23) 9/21/2000 DTaP/Tdap/Td series (1 - Tdap) 9/21/2002 INFLUENZA AGE 9 TO ADULT 8/1/2017 Allergies as of 7/27/2017  Review Complete On: 7/20/2017 By: Narayan Lemos MD  
  
 Severity Noted Reaction Type Reactions Peanut  07/11/2017    Rash Face swelling, scratchy throat, rash Current Immunizations  Never Reviewed No immunizations on file. Not reviewed this visit You Were Diagnosed With   
  
 Codes Comments Dural venous sinus thrombosis    -  Primary ICD-10-CM: G08 ICD-9-CM: 312 Anticoagulation goal of INR 2 to 3     ICD-10-CM: Z51.81, Z79.01 
ICD-9-CM: V58.83, V58.61 Fatigue, unspecified type     ICD-10-CM: R53.83 ICD-9-CM: 780.79 Vitals BP Pulse Temp Resp Height(growth percentile) Weight(growth percentile) (!) 142/98 (BP 1 Location: Left arm, BP Patient Position: Standing) 82 98.4 °F (36.9 °C) 16 5' 10\" (1.778 m) 143 lb (64.9 kg) SpO2 BMI Smoking Status 98% 20.52 kg/m2 Current Every Day Smoker Vitals History BMI and BSA Data Body Mass Index Body Surface Area 20.52 kg/m 2 1.79 m 2 Preferred Pharmacy Pharmacy Name Phone [facility], [address] [phone] Your Updated Medication List  
  
   
This list is accurate as of: 7/27/17 11:07 AM.  Always use your most recent med list.  
  
  
  
  
 atorvastatin 40 mg tablet Commonly known as:  LIPITOR Take 1 Tab by mouth nightly. esomeprazole 20 mg capsule Commonly known as:  NEXIUM  
TAKE 1 CAPSULE BY MOUTH DAILY FOR REFLUX  
  
 ferrous sulfate 325 mg (65 mg iron) tablet Take 1 Tab by mouth daily (with breakfast). ondansetron hcl 4 mg tablet Commonly known as:  ZOFRAN (AS HYDROCHLORIDE) Take 1 Tab by mouth every eight (8) hours as needed for Nausea. prothrombin time/INR test metr Misc  
1 Each by Does Not Apply route every seven (7) days. Used to check INR weekly  
  
 warfarin 5 mg tablet Commonly known as:  COUMADIN Take 1 Tab by mouth daily. July 2017 Details [facility] Tue Wed Thu Fri Sat  
        1  
  
  
  
  
  2  
  
  
  
   3  
  
  
  
   4  
  
  
  
   5  
  
  
  
   6  
  
  
  
   7  
  
  
  
   8  
  
  
  
  
  9  
  
  
  
   10  
  
  
  
   11  
  
  
  
   12  
  
  
  
   13  
  
  
  
   14  
  
  
  
   15  
  
  
  
  
  16  
  
  
  
   17  
  
  
  
   18  
  
  
  
   19  
  
  
  
   20  
  
  
  
   21  
  
  
  
   22  
  
  
  
  
  23  
  
  
  
   24  
  
  
  
   25  
  
  
  
   26  
  
  
  
   27 2.5 mg  
See details 28  
  
5 mg  
  
   29  
  
2.5 mg  
  
  
  30  
  
2.5 mg  
  
   31  
  
5 mg Date Details 07/27 This INR check INR: 4.1 How to take your warfarin dose To take:  2.5 mg Take one of the 2.5 mg tablets. To take:  5 mg Take one of the 5 mg tablets. August 2017 Details [facility] Tue Wed Thu Fri Sat  
    1  
  
2.5 mg  
  
   2  
  
5 mg  
  
   3  
  
2.5 mg  
  
   4  
  
  
  
   5  
  
  
  
  
  6  
  
  
  
   7  
  
  
  
   8  
  
  
  
   9  
  
  
  
   10  
  
  
  
   11  
  
  
  
   12  
  
  
  
  
  13  
  
  
  
   14  
  
  
  
   15  
  
  
  
   16  
  
  
  
   17  
  
  
  
   18  
  
  
  
   19  
  
  
  
  
  20  
  
  
  
   21  
  
  
  
   22  
  
  
  
   23  
  
  
  
   24  
  
  
  
   25  
  
  
  
   26  
  
  
  
  
  27  
  
  
  
   28  
  
  
  
   29  
  
  
  
   30  
  
  
  
   31  
  
  
  
    
 Date Details No additional details Date of next INR:  8/3/2017 How to take your warfarin dose To take:  2.5 mg Take one of the 2.5 mg tablets. To take:  5 mg Take one of the 5 mg tablets. We Performed the Following AMB POC PT/INR [79857 CPT(R)] CBC WITH AUTOMATED DIFF [66493 CPT(R)] Introducing [facility] & [facility] SERVICES! [employer] introduces [employer] patient portal. Now you can access parts of your medical record, email your doctor's office, and request medication refills online. 1. In your internet browser, go to [email or web address] 2. Click on the First Time User? Click Here link in the Sign In box. You will see the New Member Sign Up page. 3. Enter your BladeLogic Access Code exactly as it appears below. You will not need to use this code after youve completed the sign-up process. If you do not sign up before the expiration date, you must request a new code. · Blade[employer] Access Code: [email or web address] Expires: 10/3/2017  4:03 PM 
 
4. Enter the last four digits of your Social Security Number (xxxx) and Date of Birth (mm/dd/yyyy) as indicated and click Submit. You will be taken to the next sign-up page. 5. Create a BladeLogic ID. This will be your BladeLogic login ID and cannot be changed, so think of one that is secure and easy to remember. 6. Create a BladeLogic password. You can change your password at any time. 7. Enter your Password Reset Question and Answer. This can be used at a later time if you forget your password. 8. Enter your e-mail address. You will receive e-mail notification when new information is available in [address] 9. Click Sign Up. You can now view and download portions of your medical record. 10. Click the Download Summary menu link to download a portable copy of your medical information. If you have questions, please visit the Frequently Asked Questions section of the BladeLogic website. Remember, BladeLogic is NOT to be used for urgent needs. For medical emergencies, dial 911. Now available from your iPhone and Android! Please provide this summary of care documentation to your next provider. Your primary care clinician is listed as ALESHIA JERRY. If you have any questions after today's visit, please call [phone].

## 2017-07-27 NOTE — PROGRESS NOTES
Patient presents for a PT/INR  See med list and patient instructions along with results for verbal orders by Dr. Lalita Fagan. Results for orders placed or performed in visit on 07/27/17   AMB POC PT/INR   Result Value Ref Range    VALID INTERNAL CONTROL POC Yes     Prothrombin time (POC) 48.6 seconds    INR POC 4.1      Prior to Admission medications    Medication Sig Start Date End Date Taking? Authorizing Provider   esomeprazole (NEXIUM) 20 mg capsule TAKE 1 CAPSULE BY MOUTH DAILY FOR REFLUX 7/25/17  Yes Roselia Stanton NP   warfarin (COUMADIN) 5 mg tablet Take 1 Tab by mouth daily. 7/20/17  Yes Oc Vernon MD   atorvastatin (LIPITOR) 40 mg tablet Take 1 Tab by mouth nightly. 7/14/17  Yes Anita Walter MD   ferrous sulfate 325 mg (65 mg iron) tablet Take 1 Tab by mouth daily (with breakfast). 7/14/17  Yes Anita Walter MD   ondansetron hcl (ZOFRAN, AS HYDROCHLORIDE,) 4 mg tablet Take 1 Tab by mouth every eight (8) hours as needed for Nausea. 7/14/17  Yes Anita Walter MD   prothrombin time/INR test metr misc 1 Each by Does Not Apply route every seven (7) days. Used to check INR weekly 7/20/17   Oc Vernon MD     Came in and states he feels weak all over and legs feel numb and weak when questioned more he states he feels weak in general and legs are sore feeling. . Also has some dizziness at times. Dr Lalita Fagan in and asked for orthostatic BP's see results below:second set lying down, third set sitting, fourth set standing   Vitals:    07/27/17 1019 07/27/17 1031 07/27/17 1032 07/27/17 1033   BP: 125/86 138/88 (!) 135/94 (!) 142/98   Pulse: 78 71 88 82   Resp: 16      Temp: 98.4 °F (36.9 °C)      SpO2: 95% 97% 97% 98%   Weight: 143 lb (64.9 kg)      Height: 5' 10\" (1.778 m)       Dr Lalita Fagan into see him.

## 2017-07-28 LAB
BASOPHILS # BLD AUTO: 0 X10E3/UL (ref 0–0.2)
BASOPHILS NFR BLD AUTO: 1 %
EOSINOPHIL # BLD AUTO: 0.1 X10E3/UL (ref 0–0.4)
EOSINOPHIL NFR BLD AUTO: 2 %
ERYTHROCYTE [DISTWIDTH] IN BLOOD BY AUTOMATED COUNT: 23.6 % (ref 12.3–15.4)
HCT VFR BLD AUTO: 35.8 % (ref 37.5–51)
HGB BLD-MCNC: 11.2 G/DL (ref 12.6–17.7)
IMM GRANULOCYTES # BLD: 0 X10E3/UL (ref 0–0.1)
IMM GRANULOCYTES NFR BLD: 1 %
LYMPHOCYTES # BLD AUTO: 1.6 X10E3/UL (ref 0.7–3.1)
LYMPHOCYTES NFR BLD AUTO: 30 %
MCH RBC QN AUTO: 26.9 PG (ref 26.6–33)
MCHC RBC AUTO-ENTMCNC: 31.3 G/DL (ref 31.5–35.7)
MCV RBC AUTO: 86 FL (ref 79–97)
MONOCYTES # BLD AUTO: 0.4 X10E3/UL (ref 0.1–0.9)
MONOCYTES NFR BLD AUTO: 8 %
MORPHOLOGY BLD-IMP: ABNORMAL
NEUTROPHILS # BLD AUTO: 3.1 X10E3/UL (ref 1.4–7)
NEUTROPHILS NFR BLD AUTO: 58 %
PLATELET # BLD AUTO: 284 X10E3/UL (ref 150–379)
RBC # BLD AUTO: 4.17 X10E6/UL (ref 4.14–5.8)
WBC # BLD AUTO: 5.3 X10E3/UL (ref 3.4–10.8)

## 2017-08-03 ENCOUNTER — CLINICAL SUPPORT (OUTPATIENT)
Dept: FAMILY MEDICINE CLINIC | Age: 37
End: 2017-08-03

## 2017-08-03 VITALS
WEIGHT: 149 LBS | OXYGEN SATURATION: 98 % | DIASTOLIC BLOOD PRESSURE: 77 MMHG | HEART RATE: 72 BPM | BODY MASS INDEX: 21.33 KG/M2 | TEMPERATURE: 98.3 F | RESPIRATION RATE: 18 BRPM | HEIGHT: 70 IN | SYSTOLIC BLOOD PRESSURE: 125 MMHG

## 2017-08-03 DIAGNOSIS — G08 DURAL VENOUS SINUS THROMBOSIS: Primary | ICD-10-CM

## 2017-08-03 LAB
INR BLD: 2.9
PT POC: 34.3 SECONDS
VALID INTERNAL CONTROL?: YES

## 2017-08-03 RX ORDER — ONDANSETRON 4 MG/1
4 TABLET, FILM COATED ORAL
Qty: 30 TAB | Refills: 0 | Status: SHIPPED | OUTPATIENT
Start: 2017-08-03 | End: 2017-08-22 | Stop reason: SDUPTHER

## 2017-08-03 NOTE — PROGRESS NOTES
Chief Complaint   Patient presents with    Anticoagulation     Visit Vitals    /77 (BP 1 Location: Left arm, BP Patient Position: Sitting)    Pulse 72    Temp 98.3 °F (36.8 °C)    Resp 18    Ht 5' 10\" (1.778 m)    Wt 149 lb (67.6 kg)    SpO2 98%    BMI 21.38 kg/m2     Results for orders placed or performed in visit on 08/03/17   AMB POC PT/INR   Result Value Ref Range    VALID INTERNAL CONTROL POC Yes     Prothrombin time (POC) 34.3 seconds    INR POC 2.9       Pt presents today for PT/INR. PT-34.3, INR-2.9. Pt is currently alternating 2.5mg Coumadin 4 days a week and 2.5mg 3 days a week. Per Kelli Earl NP pt to continue current dose and return to the office in 2 weeks.

## 2017-08-03 NOTE — PATIENT INSTRUCTIONS
Continue current dose of Coumadin 2.5mg 4 days a week and 5mg 3 days a week. Return to the office in 2 weeks to recheck level.

## 2017-08-03 NOTE — MR AVS SNAPSHOT
Visit Information Date & Time Provider Department Dept. Phone Encounter #  
 8/3/2017  3:30 PM Mica Mendoza [place] [address] [phone] [id number] Upcoming Health Maintenance Date Due Pneumococcal 19-64 Medium Risk (1 of 1 - PPSV23) 9/21/2000 DTaP/Tdap/Td series (1 - Tdap) 9/21/2002 INFLUENZA AGE 9 TO ADULT 8/1/2017 Allergies as of 8/3/2017  Review Complete On: 7/27/2017 By: Mirella Levine MD  
  
 Severity Noted Reaction Type Reactions Peanut  07/11/2017    Rash Face swelling, scratchy throat, rash Current Immunizations  Never Reviewed No immunizations on file. Not reviewed this visit You Were Diagnosed With   
  
 Codes Comments Dural venous sinus thrombosis    -  Primary ICD-10-CM: G08 ICD-9-CM: 707 Vitals BP Pulse Temp Resp Height(growth percentile) Weight(growth percentile) 125/77 (BP 1 Location: Left arm, BP Patient Position: Sitting) 72 98.3 °F (36.8 °C) 18 5' 10\" (1.778 m) 149 lb (67.6 kg) SpO2 BMI Smoking Status 98% 21.38 kg/m2 Current Every Day Smoker Vitals History BMI and BSA Data Body Mass Index Body Surface Area  
 21.38 kg/m 2 1.83 m 2 Preferred Pharmacy Pharmacy Name Phone [facility], [address] [phone] Your Updated Medication List  
  
   
This list is accurate as of: 8/3/17  4:24 PM.  Always use your most recent med list.  
  
  
  
  
 atorvastatin 40 mg tablet Commonly known as:  LIPITOR Take 1 Tab by mouth nightly. esomeprazole 20 mg capsule Commonly known as:  NEXIUM  
TAKE 1 CAPSULE BY MOUTH DAILY FOR REFLUX  
  
 ferrous sulfate 325 mg (65 mg iron) tablet Take 1 Tab by mouth daily (with breakfast). ondansetron hcl 4 mg tablet Commonly known as:  ZOFRAN (AS HYDROCHLORIDE) Take 1 Tab by mouth every eight (8) hours as needed for Nausea. prothrombin time/INR test metr Misc 1 Each by Does Not Apply route every seven (7) days. Used to check INR weekly  
  
 warfarin 5 mg tablet Commonly known as:  COUMADIN Take 1 Tab by mouth daily. We Performed the Following AMB POC PT/INR [54468 CPT(R)] Patient Instructions Continue current dose of Coumadin 2.5mg 4 days a week and 5mg 3 days a week. Return to the office in 2 weeks to recheck level. Introducing [facility] & HEALTH SERVICES! Dariel Sweeney introduces [employer] patient portal. Now you can access parts of your medical record, email your doctor's office, and request medication refills online. 1. In your internet browser, go to [email or web address] 2. Click on the First Time User? Click Here link in the Sign In box. You will see the New Member Sign Up page. 3. Enter your [employer] Access Code exactly as it appears below. You will not need to use this code after youve completed the sign-up process. If you do not sign up before the expiration date, you must request a new code. · [employer] Access Code: [email or web address] Expires: 10/3/2017  4:03 PM 
 
4. Enter the last four digits of your Social Security Number (xxxx) and Date of Birth (mm/dd/yyyy) as indicated and click Submit. You will be taken to the next sign-up page. 5. Create a [employer] ID. This will be your [employer] login ID and cannot be changed, so think of one that is secure and easy to remember. 6. Create a [employer] password. You can change your password at any time. 7. Enter your Password Reset Question and Answer. This can be used at a later time if you forget your password. 8. Enter your e-mail address. You will receive e-mail notification when new information is available in [address] 9. Click Sign Up. You can now view and download portions of your medical record. 10. Click the Download Summary menu link to download a portable copy of your medical information. If you have questions, please visit the Frequently Asked Questions section of the [employer] website. Remember, [employer] is NOT to be used for urgent needs. For medical emergencies, dial 911. Now available from your iPhone and Android! Please provide this summary of care documentation to your next provider. Your primary care clinician is listed as ALESHIA JERRY. If you have any questions after today's visit, please call [phone].

## 2017-08-07 ENCOUNTER — PATIENT OUTREACH (OUTPATIENT)
Dept: FAMILY MEDICINE CLINIC | Age: 37
End: 2017-08-07

## 2017-08-07 NOTE — PROGRESS NOTES
Patient has been seen in follow up with Dr Cortez Ruiz. His coumadin became in therapeutic range and was able to discontinue lovenox. He has been compliant having his INR monitored. Is scheduled for repeat in 2 weeks.

## 2017-08-17 ENCOUNTER — CLINICAL SUPPORT (OUTPATIENT)
Dept: FAMILY MEDICINE CLINIC | Age: 37
End: 2017-08-17

## 2017-08-17 VITALS
HEIGHT: 70 IN | HEART RATE: 83 BPM | TEMPERATURE: 97.9 F | WEIGHT: 147 LBS | RESPIRATION RATE: 16 BRPM | SYSTOLIC BLOOD PRESSURE: 107 MMHG | DIASTOLIC BLOOD PRESSURE: 72 MMHG | BODY MASS INDEX: 21.05 KG/M2 | OXYGEN SATURATION: 96 %

## 2017-08-17 DIAGNOSIS — Z51.81 ANTICOAGULATION GOAL OF INR 2 TO 3: ICD-10-CM

## 2017-08-17 DIAGNOSIS — Z79.01 ANTICOAGULATION GOAL OF INR 2 TO 3: ICD-10-CM

## 2017-08-17 DIAGNOSIS — G08 DURAL VENOUS SINUS THROMBOSIS: Primary | ICD-10-CM

## 2017-08-17 LAB
INR BLD: 3.5
PT POC: 41.4 SECONDS
VALID INTERNAL CONTROL?: YES

## 2017-08-17 RX ORDER — LANOLIN ALCOHOL/MO/W.PET/CERES
325 CREAM (GRAM) TOPICAL
Qty: 30 TAB | Refills: 11 | Status: SHIPPED | OUTPATIENT
Start: 2017-08-17 | End: 2018-08-16 | Stop reason: SDUPTHER

## 2017-08-17 RX ORDER — WARFARIN SODIUM 5 MG/1
5 TABLET ORAL DAILY
Qty: 30 TAB | Refills: 5 | Status: SHIPPED | OUTPATIENT
Start: 2017-08-17 | End: 2018-04-16 | Stop reason: SDUPTHER

## 2017-08-17 NOTE — MR AVS SNAPSHOT
Visit Information Date & Time Provider Department Dept. Phone Encounter #  
 8/17/2017  3:30 PM Mica Mendoza [place] [address] [phone] [id number] Upcoming Health Maintenance Date Due Pneumococcal 19-64 Medium Risk (1 of 1 - PPSV23) 9/21/2000 DTaP/Tdap/Td series (1 - Tdap) 9/21/2002 INFLUENZA AGE 9 TO ADULT 8/1/2017 Allergies as of 8/17/2017  Review Complete On: 8/17/2017 By: Jaquelin Thao LPN Severity Noted Reaction Type Reactions Peanut  07/11/2017    Rash Face swelling, scratchy throat, rash Current Immunizations  Never Reviewed No immunizations on file. Not reviewed this visit You Were Diagnosed With   
  
 Codes Comments Dural venous sinus thrombosis    -  Primary ICD-10-CM: G08 ICD-9-CM: 680 Anticoagulation goal of INR 2 to 3     ICD-10-CM: Z51.81, Z79.01 
ICD-9-CM: V58.83, V58.61 Vitals BP Pulse Temp Resp Height(growth percentile) Weight(growth percentile) 107/72 (BP 1 Location: Left arm, BP Patient Position: Sitting) 83 97.9 °F (36.6 °C) 16 5' 10\" (1.778 m) 147 lb (66.7 kg) SpO2 BMI Smoking Status 96% 21.09 kg/m2 Current Every Day Smoker BMI and BSA Data Body Mass Index Body Surface Area 21.09 kg/m 2 1.82 m 2 Preferred Pharmacy Pharmacy Name Phone [facility], [address] [phone] Your Updated Medication List  
  
   
This list is accurate as of: 8/17/17  3:54 PM.  Always use your most recent med list.  
  
  
  
  
 atorvastatin 40 mg tablet Commonly known as:  LIPITOR Take 1 Tab by mouth nightly. esomeprazole 20 mg capsule Commonly known as:  NEXIUM  
TAKE 1 CAPSULE BY MOUTH DAILY FOR REFLUX  
  
 ferrous sulfate 325 mg (65 mg iron) tablet Take 1 Tab by mouth daily (with breakfast). ondansetron hcl 4 mg tablet Commonly known as:  ZOFRAN (AS HYDROCHLORIDE) Take 1 Tab by mouth every eight (8) hours as needed for Nausea. prothrombin time/INR test metr Misc  
1 Each by Does Not Apply route every seven (7) days. Used to check INR weekly  
  
 warfarin 5 mg tablet Commonly known as:  COUMADIN Take 1 Tab by mouth daily. Prescriptions Sent to Pharmacy Refills  
 warfarin (COUMADIN) 5 mg tablet 5 Sig: Take 1 Tab by mouth daily. Class: Normal  
 Pharmacy: [address] Ph #: [phone] Route: Oral  
 ferrous sulfate 325 mg (65 mg iron) tablet 11 Sig: Take 1 Tab by mouth daily (with breakfast). Class: Normal  
 Pharmacy: [address] Ph #: [phone] Route: Oral  
  
Description Take 2.5 mg on Sundays, Tuesdays, Thursdays, and Saturdays. 5 mg on Mondays, Wednesdays, and Fridays August 2017 Details Sun Mon Tue Wed Thu Fri Sat  
    1  
  
  
  
   2  
  
  
  
   3  
  
  
  
   4  
  
  
  
   5  
  
  
  
  
  6  
  
  
  
   7  
  
  
  
   8  
  
  
  
   9  
  
  
  
   10  
  
  
  
   11  
  
  
  
   12  
  
  
  
  
  13  
  
  
  
   14  
  
  
  
   15  
  
  
  
   16  
  
  
  
   17  
  
2.5 mg  
See details 18  
  
5 mg  
  
   19  
  
2.5 mg  
  
  
  20  
  
2.5 mg  
  
   21  
  
5 mg  
  
   22  
  
2.5 mg  
  
   23  
  
5 mg  
  
   24  
  
2.5 mg  
  
   25  
  
5 mg  
  
   26  
  
2.5 mg  
  
  
  27  
  
2.5 mg  
  
   28  
  
5 mg  
  
   29  
  
2.5 mg  
  
   30  
  
5 mg  
  
   31  
  
2.5 mg Date Details 08/17 This INR check INR: 3.5 Date of next INR:  8/31/2017 How to take your warfarin dose To take:  2.5 mg Take half of a 5 mg tablet. To take:  5 mg Take one of the 5 mg tablets. We Performed the Following AMB POC PT/INR [60135 CPT(R)] Introducing \[facility]\"" & HEALTH SERVICES! Felicity Pereyra introduces [employer] patient portal. Now you can access parts of your medical record, email your doctor's office, and request medication refills online. 1. In your internet browser, go to [email or web address] 2. Click on the First Time User? Click Here link in the Sign In box. You will see the New Member Sign Up page. 3. Enter your [employer] Access Code exactly as it appears below. You will not need to use this code after youve completed the sign-up process. If you do not sign up before the expiration date, you must request a new code. · [employer] Access Code: [email or web address] Expires: 10/3/2017  4:03 PM 
 
4. Enter the last four digits of your Social Security Number (xxxx) and Date of Birth (mm/dd/yyyy) as indicated and click Submit. You will be taken to the next sign-up page. 5. Create a [employer] ID. This will be your [employer] login ID and cannot be changed, so think of one that is secure and easy to remember. 6. Create a [employer] password. You can change your password at any time. 7. Enter your Password Reset Question and Answer. This can be used at a later time if you forget your password. 8. Enter your e-mail address. You will receive e-mail notification when new information is available in [address] 9. Click Sign Up. You can now view and download portions of your medical record. 10. Click the Download Summary menu link to download a portable copy of your medical information. If you have questions, please visit the Frequently Asked Questions section of the V[employer]opia website. Remember, [employer] is NOT to be used for urgent needs. For medical emergencies, dial 911. Now available from your iPhone and Android! Please provide this summary of care documentation to your next provider. Your primary care clinician is listed as ALESHIA JERRY. If you have any questions after today's visit, please call [phone].

## 2017-08-17 NOTE — PROGRESS NOTES
Patient presents for a PT/INR  See med list and patient instructions along with results for verbal orders by Dr. John York. Results for orders placed or performed in visit on 08/17/17   AMB POC PT/INR   Result Value Ref Range    VALID INTERNAL CONTROL POC Yes     Prothrombin time (POC) 41.4 seconds    INR POC 3.5      Vitals:    08/17/17 1537   BP: 107/72   Pulse: 83   Resp: 16   Temp: 97.9 °F (36.6 °C)   SpO2: 96%   Weight: 147 lb (66.7 kg)   Height: 5' 10\" (1.778 m)     Prior to Admission medications    Medication Sig Start Date End Date Taking? Authorizing Provider   warfarin (COUMADIN) 5 mg tablet Take 1 Tab by mouth daily. 8/17/17  Yes Duc Fleming MD   ferrous sulfate 325 mg (65 mg iron) tablet Take 1 Tab by mouth daily (with breakfast). 8/17/17  Yes Duc Fleming MD   ondansetron hcl (ZOFRAN, AS HYDROCHLORIDE,) 4 mg tablet Take 1 Tab by mouth every eight (8) hours as needed for Nausea. 8/3/17  Yes Roselia Stanton NP   esomeprazole (NEXIUM) 20 mg capsule TAKE 1 CAPSULE BY MOUTH DAILY FOR REFLUX 7/25/17  Yes Roselia Stanton NP   atorvastatin (LIPITOR) 40 mg tablet Take 1 Tab by mouth nightly. 7/14/17  Yes Dariana España MD   prothrombin time/INR test metr misc 1 Each by Does Not Apply route every seven (7) days. Used to check INR weekly 7/20/17   Duc Fleming MD     He said he is taking coumadin 2.5 mg 3 days a week, and 5 mg 4 days a week. Per Dr. John York he is to take 2.5 mg on Sundays, Tuesdays, Thursdays, and Saturdays. 5 mg on Mondays, Wednesdays, and Fridays. He was given verbal instructions and AVS with Coumadin calender.   He voiced understanding of all instructions

## 2017-08-23 ENCOUNTER — TELEPHONE (OUTPATIENT)
Dept: FAMILY MEDICINE CLINIC | Age: 37
End: 2017-08-23

## 2017-08-23 NOTE — TELEPHONE ENCOUNTER
8/23/17 @ 16:00 Attempted PA for Esomeprazole 20 mg capsules. Called [phone] spoke with [facility] transfered to [facility] plan do not cover any proton pump inhibitor,excluded from plan, drug exclusion form will be faxed to [phone].

## 2017-08-24 RX ORDER — ATORVASTATIN CALCIUM 40 MG/1
TABLET, FILM COATED ORAL
Qty: 90 TAB | Refills: 3 | Status: SHIPPED | OUTPATIENT
Start: 2017-08-24 | End: 2019-04-01 | Stop reason: SDUPTHER

## 2017-08-24 RX ORDER — ONDANSETRON 4 MG/1
TABLET, FILM COATED ORAL
Qty: 30 TAB | Refills: 0 | Status: SHIPPED | OUTPATIENT
Start: 2017-08-24 | End: 2017-09-18 | Stop reason: SDUPTHER

## 2017-08-25 DIAGNOSIS — K21.9 GASTROESOPHAGEAL REFLUX DISEASE WITHOUT ESOPHAGITIS: ICD-10-CM

## 2017-08-25 DIAGNOSIS — Z87.19 HISTORY OF PANCREATITIS: ICD-10-CM

## 2017-08-28 RX ORDER — HYDROGEN PEROXIDE 3 %
SOLUTION, NON-ORAL MISCELLANEOUS
Qty: 30 CAP | Refills: 6 | OUTPATIENT
Start: 2017-08-28

## 2017-08-29 NOTE — TELEPHONE ENCOUNTER
Patient verified his name and . Patient notified PPI not covered by insurance. Can purchase OTC. Patient verbalized understanding.

## 2017-08-31 ENCOUNTER — CLINICAL SUPPORT (OUTPATIENT)
Dept: FAMILY MEDICINE CLINIC | Age: 37
End: 2017-08-31

## 2017-08-31 VITALS
HEIGHT: 70 IN | RESPIRATION RATE: 16 BRPM | WEIGHT: 141 LBS | SYSTOLIC BLOOD PRESSURE: 133 MMHG | HEART RATE: 86 BPM | TEMPERATURE: 98.7 F | OXYGEN SATURATION: 96 % | BODY MASS INDEX: 20.19 KG/M2 | DIASTOLIC BLOOD PRESSURE: 94 MMHG

## 2017-08-31 DIAGNOSIS — Z79.01 ANTICOAGULATION GOAL OF INR 2 TO 3: ICD-10-CM

## 2017-08-31 DIAGNOSIS — G08 DURAL VENOUS SINUS THROMBOSIS: Primary | ICD-10-CM

## 2017-08-31 DIAGNOSIS — Z51.81 ANTICOAGULATION GOAL OF INR 2 TO 3: ICD-10-CM

## 2017-08-31 LAB
INR BLD: 1.8
PT POC: 21.2 SECONDS
VALID INTERNAL CONTROL?: YES

## 2017-08-31 NOTE — MR AVS SNAPSHOT
Visit Information Date & Time Provider Department Dept. Phone Encounter #  
 8/31/2017  3:30 PM Mica Mendoza [place] [address] [phone] [id number] Upcoming Health Maintenance Date Due Pneumococcal 19-64 Medium Risk (1 of 1 - PPSV23) 9/21/2000 DTaP/Tdap/Td series (1 - Tdap) 9/21/2002 INFLUENZA AGE 9 TO ADULT 8/1/2017 Allergies as of 8/31/2017  Review Complete On: 8/31/2017 By: Eugenie Hilliard LPN Severity Noted Reaction Type Reactions Peanut  07/11/2017    Rash Face swelling, scratchy throat, rash Current Immunizations  Never Reviewed No immunizations on file. Not reviewed this visit You Were Diagnosed With   
  
 Codes Comments Dural venous sinus thrombosis    -  Primary ICD-10-CM: G08 ICD-9-CM: 376 Anticoagulation goal of INR 2 to 3     ICD-10-CM: Z51.81, Z79.01 
ICD-9-CM: V58.83, V58.61 Vitals BP Pulse Temp Resp Height(growth percentile) Weight(growth percentile) (!) 133/94 (BP 1 Location: Right arm, BP Patient Position: Sitting) 86 98.7 °F (37.1 °C) 16 5' 10\" (1.778 m) 141 lb (64 kg) SpO2 BMI Smoking Status 96% 20.23 kg/m2 Current Every Day Smoker BMI and BSA Data Body Mass Index Body Surface Area  
 20.23 kg/m 2 1.78 m 2 Preferred Pharmacy Pharmacy Name Phone [facility] [address] [phone] Your Updated Medication List  
  
   
This list is accurate as of: 8/31/17  3:39 PM.  Always use your most recent med list.  
  
  
  
  
 atorvastatin 40 mg tablet Commonly known as:  LIPITOR  
TAKE ONE TABLET BY MOUTH NIGHTLY  
  
 esomeprazole 20 mg capsule Commonly known as:  NEXIUM  
TAKE 1 CAPSULE BY MOUTH DAILY FOR REFLUX  
  
 ferrous sulfate 325 mg (65 mg iron) tablet Take 1 Tab by mouth daily (with breakfast). ondansetron hcl 4 mg tablet Commonly known as:  Edwin Sorto  
 TAKE 1 TABLET BY MOUTH EVERY EIGHT HOURS AS NEEDED FOR NAUSEA. prothrombin time/INR test metr Misc  
1 Each by Does Not Apply route every seven (7) days. Used to check INR weekly  
  
 warfarin 5 mg tablet Commonly known as:  COUMADIN Take 1 Tab by mouth daily. Description Take 2.5 mg on Sundays, Tuesdays, Thursdays, and Saturdays. 5 mg on Mondays, Wednesdays, and Fridays August 2017 Details Sun Mon Tue Wed Thu Fri Sat  
    1  
  
  
  
   2  
  
  
  
   3  
  
  
  
   4  
  
  
  
   5  
  
  
  
  
  6  
  
  
  
   7  
  
  
  
   8  
  
  
  
   9  
  
  
  
   10  
  
  
  
   11  
  
  
  
   12  
  
  
  
  
  13  
  
  
  
   14  
  
  
  
   15  
  
  
  
   16  
  
  
  
   17  
  
  
  
   18  
  
  
  
   19  
  
  
  
  
  20  
  
  
  
   21  
  
  
  
   22  
  
  
  
   23  
  
  
  
   24  
  
  
  
   25  
  
  
  
   26  
  
  
  
  
  27  
  
  
  
   28  
  
  
  
   29  
  
  
  
   30  
  
  
  
   31  
  
2.5 mg  
See details Date Details 08/31 This INR check INR: 1.8 How to take your warfarin dose To take:  2.5 mg Take half of a 5 mg tablet. September 2017 Details [place]  Tue Wed Thu Fri Sat  
       1  
  
5 mg 2  
  
5 mg  
  
  
  3  
  
2.5 mg  
  
   4  
  
5 mg  
  
   5  
  
2.5 mg  
  
   6  
  
5 mg  
  
   7  
  
2.5 mg  
  
   8  
  
5 mg  
  
   9  
  
5 mg  
  
  
  10  
  
2.5 mg  
  
   11  
  
5 mg  
  
   12  
  
2.5 mg  
  
   13  
  
5 mg  
  
   14  
  
2.5 mg  
  
   15  
  
  
  
   16  
  
  
  
  
  17  
  
  
  
   18  
  
  
  
   19  
  
  
  
   20  
  
  
  
   21  
  
  
  
   22  
  
  
  
   23  
  
  
  
  
  24  
  
  
  
   25  
  
  
  
   26  
  
  
  
   27  
  
  
  
   28  
  
  
  
   29  
  
  
  
   30  
  
  
  
  
 Date Details No additional details Date of next INR:  9/14/2017 How to take your warfarin dose To take:  2.5 mg Take half of a 5 mg tablet. To take:  5 mg Take one of the 5 mg tablets. We Performed the Following AMB POC PT/INR [14488 CPT(R)] Introducing [facility]! Landon Shaun introduces Snapchat patient portal. Now you can access parts of your medical record, email your doctor's office, and request medication refills online. 1. In your internet browser, go to [email or web address] [place] [place] 2. Click on the First Time User? Click Here link in the Sign In box. You will see the New Member Sign Up page. 3. Enter your Snapchat Access Code exactly as it appears below. You will not need to use this code after youve completed the sign-up process. If you do not sign up before the expiration date, you must request a new code. · Snapchat Access Code: [email or web address] Expires: 10/3/2017  4:03 PM 
 
4. Enter the last four digits of your Social Security Number (xxxx) and Date of Birth (mm/dd/yyyy) as indicated and click Submit. You will be taken to the next sign-up page. 5. Create a Snapchat ID. This will be your Snapchat login ID and cannot be changed, so think of one that is secure and easy to remember. 6. Create a Snapchat password. You can change your password at any time. 7. Enter your Password Reset Question and Answer. This can be used at a later time if you forget your password. 8. Enter your e-mail address. You will receive e-mail notification when new information is available in [address] 9. Click Sign Up. You can now view and download portions of your medical record. 10. Click the Download Summary menu link to download a portable copy of your medical information. If you have questions, please visit the Frequently Asked Questions section of the Snapchat website. Remember, Snapchat is NOT to be used for urgent needs. For medical emergencies, dial 911. Now available from your iPhone and Android! Please provide this summary of care documentation to your next provider. Your primary care clinician is listed as ALESHIA JERRY. If you have any questions after today's visit, please call [phone].

## 2017-08-31 NOTE — PROGRESS NOTES
Patient presents for a PT/INR  See med list and patient instructions along with results for verbal orders by Dr. Romaine Lopez. Results for orders placed or performed in visit on 08/31/17   AMB POC PT/INR   Result Value Ref Range    VALID INTERNAL CONTROL POC Yes     Prothrombin time (POC) 21.2 seconds    INR POC 1.8      Vitals:    08/31/17 1529   BP: (!) 133/94   Pulse: 86   Resp: 16   Temp: 98.7 °F (37.1 °C)   SpO2: 96%   Weight: 141 lb (64 kg)   Height: 5' 10\" (1.778 m)     Prior to Admission medications    Medication Sig Start Date End Date Taking? Authorizing Provider   ondansetron hcl (ZOFRAN) 4 mg tablet TAKE 1 TABLET BY MOUTH EVERY EIGHT HOURS AS NEEDED FOR NAUSEA. 8/24/17  Yes Roselia Stanton NP   atorvastatin (LIPITOR) 40 mg tablet TAKE ONE TABLET BY MOUTH NIGHTLY 8/24/17  Yes Rey Mendoza MD   warfarin (COUMADIN) 5 mg tablet Take 1 Tab by mouth daily. 8/17/17  Yes Rey Mendoza MD   ferrous sulfate 325 mg (65 mg iron) tablet Take 1 Tab by mouth daily (with breakfast). 8/17/17  Yes Rey Mendoza MD   esomeprazole (NEXIUM) 20 mg capsule TAKE 1 CAPSULE BY MOUTH DAILY FOR REFLUX 7/25/17  Yes Zan Merida NP   prothrombin time/INR test metr misc 1 Each by Does Not Apply route every seven (7) days. Used to check INR weekly 7/20/17   Rey Mendoza MD     Coumadin dose was 5 mg Mondays, Wednesdays, and Fridays, and 2.5 mg the rest of the week. Per Dr. La Dalton verbal order dose changed to 5 mg Mondays, Wednesdays, Fridays and Saturdays and 2.5 mg the rest of the week. See anti-coag episode for instructions. AVS and verbal instructions given he voiced understanding of all instructions.

## 2017-08-31 NOTE — PROGRESS NOTES
Patient presents for a PT/INR  See med list and patient instructions along with results for verbal orders by Dr. Qamar Westfall.

## 2017-09-15 ENCOUNTER — CLINICAL SUPPORT (OUTPATIENT)
Dept: FAMILY MEDICINE CLINIC | Age: 37
End: 2017-09-15

## 2017-09-15 VITALS
HEART RATE: 84 BPM | HEIGHT: 70 IN | TEMPERATURE: 98.6 F | OXYGEN SATURATION: 98 % | RESPIRATION RATE: 16 BRPM | SYSTOLIC BLOOD PRESSURE: 138 MMHG | BODY MASS INDEX: 20.47 KG/M2 | WEIGHT: 143 LBS | DIASTOLIC BLOOD PRESSURE: 90 MMHG

## 2017-09-15 DIAGNOSIS — G08 DURAL VENOUS SINUS THROMBOSIS: Primary | ICD-10-CM

## 2017-09-15 DIAGNOSIS — Z79.01 ANTICOAGULATION MONITORING, INR RANGE 2-3: ICD-10-CM

## 2017-09-15 LAB
INR BLD: 1.7
PT POC: 20.2 SECONDS
VALID INTERNAL CONTROL?: YES

## 2017-09-18 RX ORDER — ONDANSETRON 4 MG/1
TABLET, FILM COATED ORAL
Qty: 30 TAB | Refills: 0 | Status: SHIPPED | OUTPATIENT
Start: 2017-09-18 | End: 2017-10-18 | Stop reason: SDUPTHER

## 2017-10-02 ENCOUNTER — CLINICAL SUPPORT (OUTPATIENT)
Dept: FAMILY MEDICINE CLINIC | Age: 37
End: 2017-10-02

## 2017-10-02 VITALS
BODY MASS INDEX: 20.47 KG/M2 | TEMPERATURE: 98.5 F | HEIGHT: 70 IN | RESPIRATION RATE: 17 BRPM | SYSTOLIC BLOOD PRESSURE: 111 MMHG | OXYGEN SATURATION: 95 % | WEIGHT: 143 LBS | HEART RATE: 83 BPM | DIASTOLIC BLOOD PRESSURE: 66 MMHG

## 2017-10-02 DIAGNOSIS — G08 DURAL VENOUS SINUS THROMBOSIS: ICD-10-CM

## 2017-10-02 LAB
INR BLD: 2.8
PT POC: 33.3 SECONDS
VALID INTERNAL CONTROL?: YES

## 2017-10-13 ENCOUNTER — HOSPITAL ENCOUNTER (OUTPATIENT)
Dept: CT IMAGING | Age: 37
Discharge: HOME | End: 2017-10-13
Attending: INTERNAL MEDICINE
Payer: COMMERCIAL

## 2017-10-13 DIAGNOSIS — Z79.01 LONG TERM CURRENT USE OF ANTICOAGULANT THERAPY: ICD-10-CM

## 2017-10-13 DIAGNOSIS — G08 INTRACRANIAL AND INTRASPINAL PHLEBITIS AND THROMBOPHLEBITIS: ICD-10-CM

## 2017-10-13 DIAGNOSIS — I82.409 ACUTE EMBOLISM AND THROMBOSIS OF UNSPECIFIED DEEP VEINS OF UNSPECIFIED LOWER EXTREMITY (HCC): ICD-10-CM

## 2017-10-13 PROCEDURE — 71260 CT THORAX DX C+: CPT

## 2017-10-13 PROCEDURE — 74011000255 HC RX REV CODE- 255: Performed by: INTERNAL MEDICINE

## 2017-10-13 PROCEDURE — 74177 CT ABD & PELVIS W/CONTRAST: CPT

## 2017-10-13 PROCEDURE — 74011250636 HC RX REV CODE- 250/636: Performed by: INTERNAL MEDICINE

## 2017-10-13 PROCEDURE — 74011636320 HC RX REV CODE- 636/320: Performed by: INTERNAL MEDICINE

## 2017-10-13 RX ORDER — SODIUM CHLORIDE 9 MG/ML
50 INJECTION, SOLUTION INTRAVENOUS
Status: COMPLETED | OUTPATIENT
Start: 2017-10-13 | End: 2017-10-13

## 2017-10-13 RX ORDER — SODIUM CHLORIDE 0.9 % (FLUSH) 0.9 %
10 SYRINGE (ML) INJECTION
Status: COMPLETED | OUTPATIENT
Start: 2017-10-13 | End: 2017-10-13

## 2017-10-13 RX ORDER — BARIUM SULFATE 20 MG/ML
900 SUSPENSION ORAL
Status: COMPLETED | OUTPATIENT
Start: 2017-10-13 | End: 2017-10-13

## 2017-10-13 RX ADMIN — SODIUM CHLORIDE 50 ML/HR: 900 INJECTION, SOLUTION INTRAVENOUS at 10:59

## 2017-10-13 RX ADMIN — IOPAMIDOL 100 ML: 755 INJECTION, SOLUTION INTRAVENOUS at 10:59

## 2017-10-13 RX ADMIN — BARIUM SULFATE 900 ML: 21 SUSPENSION ORAL at 10:59

## 2017-10-13 RX ADMIN — Medication 10 ML: at 10:59

## 2017-10-18 RX ORDER — ONDANSETRON 4 MG/1
4 TABLET, FILM COATED ORAL
Qty: 30 TAB | Refills: 0 | Status: SHIPPED | OUTPATIENT
Start: 2017-10-18 | End: 2017-11-17 | Stop reason: SDUPTHER

## 2017-10-18 NOTE — TELEPHONE ENCOUNTER
Chief Complaint   Patient presents with    Medication Refill     Last refill:     4 weeks ago (9/18/2017)       ondansetron hcl (ZOFRAN) 4 mg tablet       TAKE 1 TABLET BY MOUTH EVERY EIGHT HOURS AS NEEDED FOR NAUSEA.        Dispense: 30 Tab     Refills: 0     Start: 9/18/2017     By: Suzanne Swain NP       Future Appointments:  10/20/2017 3:00 PM    [facility] NURSE                  Naina Jarrett      Last Appointment With Me:  7/20/2017      Last Appointment My Department:  10/2/2017

## 2017-10-20 ENCOUNTER — CLINICAL SUPPORT (OUTPATIENT)
Dept: FAMILY MEDICINE CLINIC | Age: 37
End: 2017-10-20

## 2017-10-20 VITALS
BODY MASS INDEX: 20.47 KG/M2 | DIASTOLIC BLOOD PRESSURE: 78 MMHG | OXYGEN SATURATION: 97 % | WEIGHT: 143 LBS | RESPIRATION RATE: 20 BRPM | TEMPERATURE: 98.5 F | HEART RATE: 85 BPM | HEIGHT: 70 IN | SYSTOLIC BLOOD PRESSURE: 116 MMHG

## 2017-10-20 DIAGNOSIS — Z51.81 ANTICOAGULATION GOAL OF INR 2 TO 3: ICD-10-CM

## 2017-10-20 DIAGNOSIS — Z79.01 ANTICOAGULATION GOAL OF INR 2 TO 3: ICD-10-CM

## 2017-10-20 DIAGNOSIS — G08 DURAL VENOUS SINUS THROMBOSIS: Primary | ICD-10-CM

## 2017-10-20 LAB
INR BLD: 1.8
PT POC: 22 SECONDS
VALID INTERNAL CONTROL?: YES

## 2017-10-20 NOTE — PROGRESS NOTES
Patient presents for a PT/INR  See med list and patient instructions along with results for verbal orders by Dr. Yani Torres. Results for orders placed or performed in visit on 10/20/17   AMB POC PT/INR   Result Value Ref Range    VALID INTERNAL CONTROL POC Yes     Prothrombin time (POC) 22.0 seconds    INR POC 1.8      Vitals:    10/20/17 0859   BP: 116/78   Pulse: 85   Resp: 20   Temp: 98.5 °F (36.9 °C)   SpO2: 97%   Weight: 143 lb (64.9 kg)   Height: 5' 10\" (1.778 m)     Prior to Admission medications    Medication Sig Start Date End Date Taking? Authorizing Provider   ondansetron hcl (ZOFRAN) 4 mg tablet Take 1 Tab by mouth every eight (8) hours as needed for Nausea. 10/18/17  Yes Emily Shaikh MD   atorvastatin (LIPITOR) 40 mg tablet TAKE ONE TABLET BY MOUTH NIGHTLY 8/24/17  Yes Emily Shaikh MD   warfarin (COUMADIN) 5 mg tablet Take 1 Tab by mouth daily. 8/17/17  Yes Eimly Shaikh MD   ferrous sulfate 325 mg (65 mg iron) tablet Take 1 Tab by mouth daily (with breakfast). 8/17/17  Yes Emily Shaikh MD   esomeprazole (NEXIUM) 20 mg capsule TAKE 1 CAPSULE BY MOUTH DAILY FOR REFLUX 7/25/17  Yes Jeniffer Gill NP     Per Dr. Yani Torres he is to stay on same dose of coumadin which is 2.5 mg on Tuesdays and Thursdays and 5 mg the rest of the week. See anti-coag episode for instructions which are on AVS.    AVS and verbal instructions given he voiced understanding of all instructions.

## 2017-10-20 NOTE — MR AVS SNAPSHOT
Visit Information Date & Time Provider Department Dept. Phone Encounter #  
 10/20/2017  8:30 AM Mica Mendoza [place] [address] [phone] [id number] Upcoming Health Maintenance Date Due Pneumococcal 19-64 Medium Risk (1 of 1 - PPSV23) 9/21/2000 DTaP/Tdap/Td series (1 - Tdap) 9/21/2002 Allergies as of 10/20/2017  Review Complete On: 10/20/2017 By: Jc Fernandez LPN Severity Noted Reaction Type Reactions Peanut  07/11/2017    Rash Face swelling, scratchy throat, rash Current Immunizations  Never Reviewed No immunizations on file. Not reviewed this visit You Were Diagnosed With   
  
 Codes Comments Dural venous sinus thrombosis    -  Primary ICD-10-CM: G08 ICD-9-CM: 646 Anticoagulation goal of INR 2 to 3     ICD-10-CM: Z51.81, Z79.01 
ICD-9-CM: V58.83, V58.61 Vitals BP Pulse Temp Resp Height(growth percentile) Weight(growth percentile) 116/78 (BP 1 Location: Right arm, BP Patient Position: Sitting) 85 98.5 °F (36.9 °C) 20 5' 10\" (1.778 m) 143 lb (64.9 kg) SpO2 BMI Smoking Status 97% 20.52 kg/m2 Current Every Day Smoker BMI and BSA Data Body Mass Index Body Surface Area 20.52 kg/m 2 1.79 m 2 Preferred Pharmacy Pharmacy Name Phone [facility] [address] [phone] Your Updated Medication List  
  
   
This list is accurate as of: 10/20/17  9:12 AM.  Always use your most recent med list.  
  
  
  
  
 atorvastatin 40 mg tablet Commonly known as:  LIPITOR  
TAKE ONE TABLET BY MOUTH NIGHTLY  
  
 esomeprazole 20 mg capsule Commonly known as:  NEXIUM  
TAKE 1 CAPSULE BY MOUTH DAILY FOR REFLUX  
  
 ferrous sulfate 325 mg (65 mg iron) tablet Take 1 Tab by mouth daily (with breakfast). ondansetron hcl 4 mg tablet Commonly known as:  Daniel Porteous Take 1 Tab by mouth every eight (8) hours as needed for Nausea. warfarin 5 mg tablet Commonly known as:  COUMADIN Take 1 Tab by mouth daily. Description Take 2.5 mg on Tuesdays, Thursdays, and 5 mg  The other days of the week October 2017 Details Sun Mon Tue Wed Thu Fri Sat  
  1  
  
  
  
   2  
  
  
  
   3  
  
  
  
   4  
  
  
  
   5  
  
  
  
   6  
  
  
  
   7  
  
  
  
  
  8  
  
  
  
   9  
  
  
  
   10  
  
  
  
   11  
  
  
  
   12  
  
  
  
   13  
  
  
  
   14  
  
  
  
  
  15  
  
  
  
   16  
  
  
  
   17  
  
  
  
   18  
  
  
  
   19  
  
  
  
   20  
  
5 mg See details 21  
  
5 mg  
  
  
  22  
  
5 mg  
  
   23  
  
5 mg  
  
   24  
  
2.5 mg  
  
   25  
  
5 mg  
  
   26  
  
2.5 mg  
  
   27  
  
5 mg  
  
   28  
  
5 mg  
  
  
  29  
  
5 mg  
  
   30  
  
5 mg  
  
   31  
  
2.5 mg Date Details 10/20 This INR check INR: 1.8! How to take your warfarin dose To take:  2.5 mg Take half of a 5 mg tablet. To take:  5 mg Take one of the 5 mg tablets. November 2017 Details Sun Mon Tue Wed Thu Fri Sat  
     1  
  
5 mg  
  
   2  
  
2.5 mg  
  
   3  
  
5 mg  
  
   4  
  
  
  
  
  5  
  
  
  
   6  
  
  
  
   7  
  
  
  
   8  
  
  
  
   9  
  
  
  
   10  
  
  
  
   11  
  
  
  
  
  12  
  
  
  
   13  
  
  
  
   14  
  
  
  
   15  
  
  
  
   16  
  
  
  
   17  
  
  
  
   18  
  
  
  
  
  19  
  
  
  
   20  
  
  
  
   21  
  
  
  
   22  
  
  
  
   23  
  
  
  
   24  
  
  
  
   25  
  
  
  
  
  26  
  
  
  
   27  
  
  
  
   28  
  
  
  
   29  
  
  
  
   30  
  
  
  
    
 Date Details No additional details Date of next INR:  11/3/2017 How to take your warfarin dose To take:  2.5 mg Take half of a 5 mg tablet. To take:  5 mg Take one of the 5 mg tablets. We Performed the Following AMB POC PT/INR [90691 CPT(R)] Introducing [facility]! Monet michele Evergig patient portal. Now you can access parts of your medical record, email your doctor's office, and request medication refills online. 1. In your internet browser, go to [email or web address] 2. Click on the First Time User? Click Here link in the Sign In box. You will see the New Member Sign Up page. 3. Enter your Evergig Access Code exactly as it appears below. You will not need to use this code after youve completed the sign-up process. If you do not sign up before the expiration date, you must request a new code. · Evergig Access Code: [email or web address] Expires: 1/11/2018 10:27 AM 
 
4. Enter the last four digits of your Social Security Number (xxxx) and Date of Birth (mm/dd/yyyy) as indicated and click Submit. You will be taken to the next sign-up page. 5. Create a Evergig ID. This will be your Evergig login ID and cannot be changed, so think of one that is secure and easy to remember. 6. Create a Evergig password. You can change your password at any time. 7. Enter your Password Reset Question and Answer. This can be used at a later time if you forget your password. 8. Enter your e-mail address. You will receive e-mail notification when new information is available in [address] 9. Click Sign Up. You can now view and download portions of your medical record. 10. Click the Download Summary menu link to download a portable copy of your medical information. If you have questions, please visit the Frequently Asked Questions section of the Evergig website. Remember, Evergig is NOT to be used for urgent needs. For medical emergencies, dial 911. Now available from your iPhone and Android! Please provide this summary of care documentation to your next provider. Your primary care clinician is listed as Nilson Tsang. If you have any questions after today's visit, please call [phone].

## 2017-11-21 RX ORDER — ONDANSETRON 4 MG/1
TABLET, FILM COATED ORAL
Qty: 30 TAB | Refills: 0 | Status: SHIPPED | OUTPATIENT
Start: 2017-11-21 | End: 2017-12-20 | Stop reason: SDUPTHER

## 2017-12-20 ENCOUNTER — CLINICAL SUPPORT (OUTPATIENT)
Dept: FAMILY MEDICINE CLINIC | Age: 37
End: 2017-12-20

## 2017-12-20 VITALS
DIASTOLIC BLOOD PRESSURE: 87 MMHG | RESPIRATION RATE: 17 BRPM | HEART RATE: 85 BPM | HEIGHT: 70 IN | SYSTOLIC BLOOD PRESSURE: 136 MMHG | BODY MASS INDEX: 18.9 KG/M2 | TEMPERATURE: 98.4 F | OXYGEN SATURATION: 98 % | WEIGHT: 132 LBS

## 2017-12-20 DIAGNOSIS — R63.4 WEIGHT LOSS: ICD-10-CM

## 2017-12-20 DIAGNOSIS — Z51.81 ANTICOAGULATION GOAL OF INR 2 TO 3: ICD-10-CM

## 2017-12-20 DIAGNOSIS — Z79.01 ANTICOAGULATION GOAL OF INR 2 TO 3: ICD-10-CM

## 2017-12-20 DIAGNOSIS — D50.9 IRON DEFICIENCY ANEMIA, UNSPECIFIED IRON DEFICIENCY ANEMIA TYPE: ICD-10-CM

## 2017-12-20 DIAGNOSIS — G08 DURAL VENOUS SINUS THROMBOSIS: Primary | ICD-10-CM

## 2017-12-20 DIAGNOSIS — R10.13 EPIGASTRIC PAIN: ICD-10-CM

## 2017-12-20 LAB
INR BLD: 3
PT POC: 36.4 SECONDS
VALID INTERNAL CONTROL?: YES

## 2017-12-20 RX ORDER — PANTOPRAZOLE SODIUM 40 MG/1
40 TABLET, DELAYED RELEASE ORAL DAILY
Qty: 90 TAB | Refills: 3 | Status: SHIPPED | OUTPATIENT
Start: 2017-12-20 | End: 2018-12-20 | Stop reason: SDUPTHER

## 2017-12-20 RX ORDER — ONDANSETRON 4 MG/1
TABLET, FILM COATED ORAL
Qty: 30 TAB | Refills: 0 | Status: SHIPPED | OUTPATIENT
Start: 2017-12-20 | End: 2018-01-24 | Stop reason: SDUPTHER

## 2017-12-20 NOTE — PROGRESS NOTES
CECILIA Oates is a 40 y.o. male who presents for Coumadin check converted to MD visit    He has not been seen for 2 months, has lost 10 pounds in that time. Says that he is under a lot of stress at work because of finances and a unresponsive boss. He is also had several deaths of close friends and family. He says he has been out of the house for the last few days. He feels like he is not eating enough. He says he will be very hungry and then eat a few bites of something and will lose his appetite. Says this sensation is coming going on for about 8 years but it is getting worse in the last 2 months. He has not told any doctors about this before. He has a history of alcohol use, 6 beers per day and has a history of alcoholic pancreatitis (?  Hypertriglyceridemia) although he points to left lower quadrant when describing the pancreatitis pain. He has cut back on the amount of alcohol that he is using and is not drinking anything at the moment. Admits that when he does try to drink he will almost immediately get nauseous. He has about 4 bowel movements per day, it is hard small bowel movements    He is a history of requiring hemorrhoid banding, feels like he has a hemorrhoid that bleeds occasionally    PMHx:  Past Medical History:   Diagnosis Date    Bleeding hemorrhoid     Elevated cholesterol     Hypertension     Pancreatitis        Meds:   Current Outpatient Prescriptions   Medication Sig Dispense Refill    pantoprazole (PROTONIX) 40 mg tablet Take 1 Tab by mouth daily. 90 Tab 3    ondansetron hcl (ZOFRAN) 4 mg tablet TAKE 1 TABLET BY MOUTH EVERY EIGHT (8) HOURS AS NEEDED FOR NAUSEA. 30 Tab 0    atorvastatin (LIPITOR) 40 mg tablet TAKE ONE TABLET BY MOUTH NIGHTLY 90 Tab 3    warfarin (COUMADIN) 5 mg tablet Take 1 Tab by mouth daily. 30 Tab 5    ferrous sulfate 325 mg (65 mg iron) tablet Take 1 Tab by mouth daily (with breakfast). 30 Tab 11       Allergies:    Allergies   Allergen Reactions    Peanut Rash     Face swelling, scratchy throat, rash       Smoker:  History   Smoking Status    Current Every Day Smoker    Packs/day: 0.25   Smokeless Tobacco    Current User       ETOH:   History   Alcohol Use    Yes     Comment: one beer per day       FH:   Family History   Problem Relation Age of Onset    Hypertension Mother     Breast Cancer Mother     Hypertension Father     Heart Attack Father     No Known Problems Sister        ROS:   As listed in HPI. In addition:  Constitutional:   No headache, fever, fatigue, weight loss or weight gain      Cardiac:    No chest pain      Resp:   No cough or shortness of breath      Neuro   No loss of consciousness, dizziness, seizures      Physical Exam:  Blood pressure 136/87, pulse 85, temperature 98.4 °F (36.9 °C), temperature source Oral, resp. rate 17, height 5' 10\" (1.778 m), weight 132 lb (59.9 kg), SpO2 98 %. GEN: No apparent distress. Alert and oriented and responds to all questions appropriately. NEUROLOGIC:  No focal neurologic deficits. Strength and sensation grossly intact. Coordination and gait grossly intact. EXT: Well perfused. No edema. SKIN: No obvious rashes. Abdomen soft nontender       Assessment and Plan     History of dural venous sinus thrombosis  Followed by hematology, will require anticoagulation for 1 year (stop 7/2018)  INR 3.0 today, continue current dose and recheck in 2 weeks    Epigastric pain/early satiety/10 pound weight loss unintentional  Notices a big difference when he does not take his Nexium in these exact symptoms, will upgrade to Protonix and referred to GI, I think he needs an EGD    Possibility of pancreas and gallbladder involvement, check LFTs and lipase    Stress reaction  Provided some education on things that he can do for the stress that he is under at work. I think that a significant number of his symptoms are physiologic in nature although stress is not helping.   Sounds like he is having an ordinary response to extraordinary stress. Also sounds like he has been self-medicating for years with alcohol and tobacco and now he is not relying on these because he is trying to be healthier. Keep an eye on this    He brought up bleeding gums, this was several months ago and he has switched toothpaste and now this is better. He was having this problem when his INR was low      ICD-10-CM ICD-9-CM    1. Dural venous sinus thrombosis G08 325 AMB POC PT/INR   2. Anticoagulation goal of INR 2 to 3 Z51.81 V58.83 AMB POC PT/INR    Z79.01 V58.61    3. Epigastric pain R10.13 789.06 pantoprazole (PROTONIX) 40 mg tablet      CBC WITH AUTOMATED DIFF      METABOLIC PANEL, COMPREHENSIVE      LIPASE      REFERRAL TO GASTROENTEROLOGY   4. Weight loss R63.4 783.21    5. Iron deficiency anemia, unspecified iron deficiency anemia type D50.9 280.9        AVS given.  Pt expressed understanding of instructions

## 2017-12-20 NOTE — MR AVS SNAPSHOT
Visit Information Date & Time Provider Department Dept. Phone Encounter #  
 12/20/2017 10:50 AM Mica Mendoza [place] [address] [phone] [id number] Upcoming Health Maintenance Date Due Pneumococcal 19-64 Medium Risk (1 of 1 - PPSV23) 9/21/2000 DTaP/Tdap/Td series (1 - Tdap) 9/21/2002 Allergies as of 12/20/2017  Review Complete On: 12/20/2017 By: Silvio Murguia LPN Severity Noted Reaction Type Reactions Peanut  07/11/2017    Rash Face swelling, scratchy throat, rash Current Immunizations  Never Reviewed No immunizations on file. Not reviewed this visit You Were Diagnosed With   
  
 Codes Comments Dural venous sinus thrombosis    -  Primary ICD-10-CM: G08 ICD-9-CM: 471 Anticoagulation goal of INR 2 to 3     ICD-10-CM: Z51.81, Z79.01 
ICD-9-CM: V58.83, V58.61 Epigastric pain     ICD-10-CM: R10.13 ICD-9-CM: 789.06 Weight loss     ICD-10-CM: R63.4 ICD-9-CM: 783.21 Iron deficiency anemia, unspecified iron deficiency anemia type     ICD-10-CM: D50.9 ICD-9-CM: 280.9 Vitals BP Pulse Temp Resp Height(growth percentile) Weight(growth percentile) 136/87 (BP 1 Location: Right arm, BP Patient Position: Sitting) 85 98.4 °F (36.9 °C) (Oral) 17 5' 10\" (1.778 m) 132 lb (59.9 kg) SpO2 BMI Smoking Status 98% 18.94 kg/m2 Current Every Day Smoker BMI and BSA Data Body Mass Index Body Surface Area 18.94 kg/m 2 1.72 m 2 Preferred Pharmacy Pharmacy Name Phone [facility] [address] [phone] Your Updated Medication List  
  
   
This list is accurate as of: 12/20/17 11:50 AM.  Always use your most recent med list.  
  
  
  
  
 atorvastatin 40 mg tablet Commonly known as:  LIPITOR  
TAKE ONE TABLET BY MOUTH NIGHTLY  
  
 ferrous sulfate 325 mg (65 mg iron) tablet Take 1 Tab by mouth daily (with breakfast). ondansetron hcl 4 mg tablet Commonly known as:  ZOFRAN  
TAKE 1 TABLET BY MOUTH EVERY EIGHT (8) HOURS AS NEEDED FOR NAUSEA.  
  
 pantoprazole 40 mg tablet Commonly known as:  PROTONIX Take 1 Tab by mouth daily. warfarin 5 mg tablet Commonly known as:  COUMADIN Take 1 Tab by mouth daily. Prescriptions Sent to Pharmacy Refills  
 pantoprazole (PROTONIX) 40 mg tablet 3 Sig: Take 1 Tab by mouth daily. Class: Normal  
 Pharmacy: [address] [address] [place] [address] #: [phone] Route: Oral  
  
Description Take 2.5 mg on Tuesdays, Thursdays, and 5 mg  The other days of the week December 2017 Details Sun Mon Tue Wed Thu Fri Sat  
       1  
  
  
  
   2  
  
  
  
  
  3  
  
  
  
   4  
  
  
  
   5  
  
  
  
   6  
  
  
  
   7  
  
  
  
   8  
  
  
  
   9  
  
  
  
  
  10  
  
  
  
   11  
  
  
  
   12  
  
  
  
   13  
  
  
  
   14  
  
  
  
   15  
  
  
  
   16  
  
  
  
  
  17  
  
  
  
   18  
  
  
  
   19  
  
  
  
   20  
  
5 mg See details 21  
  
2.5 mg  
  
   22  
  
5 mg  
  
   23  
  
5 mg  
  
  
  24  
  
5 mg  
  
   25  
  
5 mg  
  
   26  
  
2.5 mg  
  
   27  
  
5 mg  
  
   28  
  
2.5 mg  
  
   29  
  
5 mg  
  
   30  
  
5 mg  
  
  
  31  
  
5 mg Date Details 12/20 This INR check INR: 3.0 Date of next INR: No date specified How to take your warfarin dose To take:  2.5 mg Take half of a 5 mg tablet. To take:  5 mg Take one of the 5 mg tablets. We Performed the Following AMB POC PT/INR [77647 CPT(R)] CBC WITH AUTOMATED DIFF [87837 CPT(R)] LIPASE [id number] CPT(R)] METABOLIC PANEL, COMPREHENSIVE [86454 CPT(R)] REFERRAL TO GASTROENTEROLOGY [NAQ30 Custom] Referral Information Referral ID Referred By Referred To  
  
 [id number] [facility] [address] [place], [address] [facility] Visits Status Start Date End Date 1 New Request 12/20/17 12/20/18 If your referral has a status of pending review or denied, additional information will be sent to support the outcome of this decision. Introducing [facility]! Theresa Carranza introduces [employer] patient portal. Now you can access parts of your medical record, email your doctor's office, and request medication refills online. 1. In your internet browser, go to [email or web address] 2. Click on the First Time User? Click Here link in the Sign In box. You will see the New Member Sign Up page. 3. Enter your [employer] Access Code exactly as it appears below. You will not need to use this code after youve completed the sign-up process. If you do not sign up before the expiration date, you must request a new code. · [employer] Access Code: [email or web address] Expires: 1/11/2018  9:27 AM 
 
4. Enter the last four digits of your Social Security Number (xxxx) and Date of Birth (mm/dd/yyyy) as indicated and click Submit. You will be taken to the next sign-up page. 5. Create a [employer] ID. This will be your [employer] login ID and cannot be changed, so think of one that is secure and easy to remember. 6. Create a [employer] password. You can change your password at any time. 7. Enter your Password Reset Question and Answer. This can be used at a later time if you forget your password. 8. Enter your e-mail address. You will receive e-mail notification when new information is available in [address] 9. Click Sign Up. You can now view and download portions of your medical record. 10. Click the Download Summary menu link to download a portable copy of your medical information. If you have questions, please visit the Frequently Asked Questions section of the [employer] website. Remember, [employer] is NOT to be used for urgent needs. For medical emergencies, dial 911. Now available from your iPhone and Android! Please provide this summary of care documentation to your next provider. Your primary care clinician is listed as Alverna Poll. If you have any questions after today's visit, please call [phone].

## 2017-12-20 NOTE — PROGRESS NOTES
Patient presents for a PT/INR  See med list and patient instructions along with results for verbal orders by Dr. Rigo Henriquez. Results for orders placed or performed in visit on 17   AMB POC PT/INR   Result Value Ref Range    VALID INTERNAL CONTROL POC Yes     Prothrombin time (POC) 36.4 seconds    INR POC 3.0      Vitals:    17 1107   BP: 136/87   Pulse: 85   Resp: 17   Temp: 98.4 °F (36.9 °C)   TempSrc: Oral   SpO2: 98%   Weight: 132 lb (59.9 kg)   Height: 5' 10\" (1.778 m)     Prior to Admission medications    Medication Sig Start Date End Date Taking? Authorizing Provider   ondansetron hcl (ZOFRAN) 4 mg tablet TAKE 1 TABLET BY MOUTH EVERY EIGHT (8) HOURS AS NEEDED FOR NAUSEA. 17  Yes Kristie Chauhan MD   atorvastatin (LIPITOR) 40 mg tablet TAKE ONE TABLET BY MOUTH NIGHTLY 17  Yes Kristie Chauhan MD   warfarin (COUMADIN) 5 mg tablet Take 1 Tab by mouth daily. 17  Yes Kristie Chauhan MD   ferrous sulfate 325 mg (65 mg iron) tablet Take 1 Tab by mouth daily (with breakfast). 17  Yes Kristie Chauhan MD   esomeprazole (NEXIUM) 20 mg capsule TAKE 1 CAPSULE BY MOUTH DAILY FOR REFLUX 17  Yes Lennox [place], NP     He has lost 11 lbs sense last visit 2 months ago. He states he has had some bleeding gums, and rectal bleeding with clots related to hemorroids. Dr. Rigo Henriquez into see.

## 2017-12-21 ENCOUNTER — TELEPHONE (OUTPATIENT)
Dept: FAMILY MEDICINE CLINIC | Age: 37
End: 2017-12-21

## 2017-12-21 LAB
ALBUMIN SERPL-MCNC: 3.2 G/DL (ref 3.5–5.5)
ALBUMIN/GLOB SERPL: 1.1 {RATIO} (ref 1.2–2.2)
ALP SERPL-CCNC: 163 IU/L (ref 39–117)
ALT SERPL-CCNC: 120 IU/L (ref 0–44)
AST SERPL-CCNC: 296 IU/L (ref 0–40)
BASOPHILS # BLD AUTO: 0 X10E3/UL (ref 0–0.2)
BASOPHILS NFR BLD AUTO: 0 %
BILIRUB SERPL-MCNC: 0.2 MG/DL (ref 0–1.2)
BUN SERPL-MCNC: 5 MG/DL (ref 6–20)
BUN/CREAT SERPL: 6 (ref 9–20)
CALCIUM SERPL-MCNC: 8.8 MG/DL (ref 8.7–10.2)
CHLORIDE SERPL-SCNC: 92 MMOL/L (ref 96–106)
CO2 SERPL-SCNC: 24 MMOL/L (ref 18–29)
CREAT SERPL-MCNC: 0.85 MG/DL (ref 0.76–1.27)
EOSINOPHIL # BLD AUTO: 0 X10E3/UL (ref 0–0.4)
EOSINOPHIL NFR BLD AUTO: 2 %
ERYTHROCYTE [DISTWIDTH] IN BLOOD BY AUTOMATED COUNT: 16.3 % (ref 12.3–15.4)
GLOBULIN SER CALC-MCNC: 2.9 G/DL (ref 1.5–4.5)
GLUCOSE SERPL-MCNC: 116 MG/DL (ref 65–99)
HCT VFR BLD AUTO: 35.5 % (ref 37.5–51)
HGB BLD-MCNC: 12.2 G/DL (ref 13–17.7)
IMM GRANULOCYTES # BLD: 0 X10E3/UL (ref 0–0.1)
IMM GRANULOCYTES NFR BLD: 0 %
LIPASE SERPL-CCNC: 21 U/L (ref 13–78)
LYMPHOCYTES # BLD AUTO: 0.9 X10E3/UL (ref 0.7–3.1)
LYMPHOCYTES NFR BLD AUTO: 37 %
MCH RBC QN AUTO: 32 PG (ref 26.6–33)
MCHC RBC AUTO-ENTMCNC: 34.4 G/DL (ref 31.5–35.7)
MCV RBC AUTO: 93 FL (ref 79–97)
MONOCYTES # BLD AUTO: 0.2 X10E3/UL (ref 0.1–0.9)
MONOCYTES NFR BLD AUTO: 6 %
NEUTROPHILS # BLD AUTO: 1.4 X10E3/UL (ref 1.4–7)
NEUTROPHILS NFR BLD AUTO: 55 %
PLATELET # BLD AUTO: 186 X10E3/UL (ref 150–379)
POTASSIUM SERPL-SCNC: 4.2 MMOL/L (ref 3.5–5.2)
PROT SERPL-MCNC: 6.1 G/DL (ref 6–8.5)
RBC # BLD AUTO: 3.81 X10E6/UL (ref 4.14–5.8)
SODIUM SERPL-SCNC: 132 MMOL/L (ref 134–144)
WBC # BLD AUTO: 2.5 X10E3/UL (ref 3.4–10.8)

## 2017-12-21 NOTE — TELEPHONE ENCOUNTER
Please add on a lipid panel if possible    Please also contact patient, his liver function is worse. It is showing a pattern consistent with damage from alcohol and I am concerned that he may have cirrhosis in addition to his other problems that we talked about. Please make sure he gets in to see GI in the next 2 weeks. I referred him to Dr Tushar Wade group. He needs to be seen for early satiety (he can only eat 3 bites at meals). Make sure they have my note and recent labs.  They can refer him to liver specialist if necessary

## 2017-12-21 NOTE — TELEPHONE ENCOUNTER
Mr. Jenkins April notified Dr. Muriel Ball said Please add on a lipid panel if possible     Please also contact patient, his liver function is worse. It is showing a pattern consistent with damage from alcohol and I am concerned that he may have cirrhosis in addition to his other problems that we talked about.     Please make sure he gets in to see GI in the next 2 weeks. I referred him to Dr Nemo hill. He needs to be seen for early satiety (he can only eat 3 bites at meals). Make sure they have my note and recent labs.  They can refer him to liver specialist if necessary      He voiced understanding and records faxed to Dr. Matthias Yang office

## 2017-12-22 LAB
CHOLEST SERPL-MCNC: 430 MG/DL (ref 100–199)
HDLC SERPL-MCNC: 8 MG/DL
INTERPRETATION, 910389: NORMAL
LDLC SERPL CALC-MCNC: ABNORMAL MG/DL (ref 0–99)
SPECIMEN STATUS REPORT, ROLRST: NORMAL
TRIGL SERPL-MCNC: 3080 MG/DL (ref 0–149)
VLDLC SERPL CALC-MCNC: ABNORMAL MG/DL (ref 5–40)

## 2017-12-28 ENCOUNTER — TELEPHONE (OUTPATIENT)
Dept: FAMILY MEDICINE CLINIC | Age: 37
End: 2017-12-28

## 2017-12-28 NOTE — TELEPHONE ENCOUNTER
Please fax the lipid panel to Dr. Sumaya Gaytan, he should have the other lab work already.     Triglycerides are extremely high, unfortunately I not sure how to reduce them in a way that is not contraindicated by his liver function

## 2018-01-03 ENCOUNTER — TELEPHONE (OUTPATIENT)
Dept: FAMILY MEDICINE CLINIC | Age: 38
End: 2018-01-03

## 2018-01-03 NOTE — TELEPHONE ENCOUNTER
[facility]    Pt's wife, Luigi Lacy, is requesting a callback to discuss the referral to see a Gastroenterologist.   Best [phone]

## 2018-01-24 RX ORDER — ONDANSETRON 4 MG/1
TABLET, FILM COATED ORAL
Qty: 30 TAB | Refills: 0 | Status: SHIPPED | OUTPATIENT
Start: 2018-01-24 | End: 2018-02-19 | Stop reason: SDUPTHER

## 2018-01-26 ENCOUNTER — HOSPITAL ENCOUNTER (OUTPATIENT)
Dept: MRI IMAGING | Age: 38
Discharge: HOME | End: 2018-01-26
Attending: SPECIALIST
Payer: COMMERCIAL

## 2018-01-26 DIAGNOSIS — K85.20 ALCOHOL INDUCED ACUTE PANCREATITIS: ICD-10-CM

## 2018-01-26 DIAGNOSIS — Z79.01 LONG-TERM (CURRENT) USE OF ANTICOAGULANTS: ICD-10-CM

## 2018-01-26 DIAGNOSIS — E78.1 HYPERTRIGLYCERIDEMIA: ICD-10-CM

## 2018-01-26 PROCEDURE — 74011250636 HC RX REV CODE- 250/636: Performed by: SPECIALIST

## 2018-01-26 PROCEDURE — A9577 INJ MULTIHANCE: HCPCS | Performed by: SPECIALIST

## 2018-01-26 PROCEDURE — 74183 MRI ABD W/O CNTR FLWD CNTR: CPT

## 2018-01-26 RX ADMIN — GADOBENATE DIMEGLUMINE 13 ML: 529 INJECTION, SOLUTION INTRAVENOUS at 16:22

## 2018-02-19 RX ORDER — ONDANSETRON 4 MG/1
TABLET, FILM COATED ORAL
Qty: 30 TAB | Refills: 0 | Status: SHIPPED | OUTPATIENT
Start: 2018-02-19 | End: 2018-03-28 | Stop reason: SDUPTHER

## 2018-03-28 RX ORDER — ONDANSETRON 4 MG/1
TABLET, FILM COATED ORAL
Qty: 30 TAB | Refills: 0 | Status: SHIPPED | OUTPATIENT
Start: 2018-03-28 | End: 2018-04-23 | Stop reason: SDUPTHER

## 2018-04-16 DIAGNOSIS — G08 DURAL VENOUS SINUS THROMBOSIS: ICD-10-CM

## 2018-04-16 RX ORDER — WARFARIN SODIUM 5 MG/1
TABLET ORAL
Qty: 30 TAB | Refills: 5 | Status: SHIPPED | OUTPATIENT
Start: 2018-04-16 | End: 2019-01-30 | Stop reason: SDUPTHER

## 2018-04-23 RX ORDER — ONDANSETRON 4 MG/1
TABLET, FILM COATED ORAL
Qty: 30 TAB | Refills: 0 | Status: SHIPPED | OUTPATIENT
Start: 2018-04-23 | End: 2018-05-29 | Stop reason: SDUPTHER

## 2018-05-29 RX ORDER — ONDANSETRON 4 MG/1
TABLET, FILM COATED ORAL
Qty: 30 TAB | Refills: 0 | Status: SHIPPED | OUTPATIENT
Start: 2018-05-29 | End: 2018-07-02 | Stop reason: SDUPTHER

## 2018-07-02 RX ORDER — ONDANSETRON 4 MG/1
TABLET, FILM COATED ORAL
Qty: 30 TAB | Refills: 0 | Status: SHIPPED | OUTPATIENT
Start: 2018-07-02 | End: 2018-08-07 | Stop reason: SDUPTHER

## 2018-08-07 RX ORDER — ONDANSETRON 4 MG/1
TABLET, FILM COATED ORAL
Qty: 30 TAB | Refills: 0 | Status: SHIPPED | OUTPATIENT
Start: 2018-08-07 | End: 2018-09-17 | Stop reason: SDUPTHER

## 2018-08-16 RX ORDER — LANOLIN ALCOHOL/MO/W.PET/CERES
CREAM (GRAM) TOPICAL
Qty: 30 TAB | Refills: 11 | Status: SHIPPED | OUTPATIENT
Start: 2018-08-16 | End: 2019-04-01

## 2018-09-17 RX ORDER — ONDANSETRON 4 MG/1
TABLET, FILM COATED ORAL
Qty: 30 TAB | Refills: 0 | Status: SHIPPED | OUTPATIENT
Start: 2018-09-17 | End: 2018-10-23 | Stop reason: SDUPTHER

## 2018-10-23 RX ORDER — ONDANSETRON 4 MG/1
TABLET, FILM COATED ORAL
Qty: 30 TAB | Refills: 0 | Status: SHIPPED | OUTPATIENT
Start: 2018-10-23 | End: 2018-12-20 | Stop reason: SDUPTHER

## 2018-12-20 DIAGNOSIS — R10.13 EPIGASTRIC PAIN: ICD-10-CM

## 2018-12-20 RX ORDER — PANTOPRAZOLE SODIUM 40 MG/1
TABLET, DELAYED RELEASE ORAL
Qty: 90 TAB | Refills: 3 | Status: SHIPPED | OUTPATIENT
Start: 2018-12-20 | End: 2019-04-01 | Stop reason: SDUPTHER

## 2018-12-20 RX ORDER — ONDANSETRON 4 MG/1
TABLET, FILM COATED ORAL
Qty: 30 TAB | Refills: 0 | Status: SHIPPED | OUTPATIENT
Start: 2018-12-20 | End: 2019-01-30 | Stop reason: SDUPTHER

## 2019-01-30 DIAGNOSIS — G08 DURAL VENOUS SINUS THROMBOSIS: ICD-10-CM

## 2019-01-30 RX ORDER — ONDANSETRON 4 MG/1
TABLET, FILM COATED ORAL
Qty: 30 TAB | Refills: 0 | Status: SHIPPED | OUTPATIENT
Start: 2019-01-30 | End: 2019-04-01 | Stop reason: SDUPTHER

## 2019-01-30 RX ORDER — WARFARIN SODIUM 5 MG/1
TABLET ORAL
Qty: 30 TAB | Refills: 5 | Status: SHIPPED | OUTPATIENT
Start: 2019-01-30 | End: 2019-04-01

## 2019-03-05 RX ORDER — ONDANSETRON 4 MG/1
TABLET, FILM COATED ORAL
Qty: 30 TAB | Refills: 0 | OUTPATIENT
Start: 2019-03-05

## 2019-04-01 ENCOUNTER — OFFICE VISIT (OUTPATIENT)
Dept: FAMILY MEDICINE CLINIC | Age: 39
End: 2019-04-01

## 2019-04-01 VITALS
DIASTOLIC BLOOD PRESSURE: 76 MMHG | TEMPERATURE: 98.6 F | RESPIRATION RATE: 12 BRPM | BODY MASS INDEX: 21.47 KG/M2 | HEART RATE: 93 BPM | SYSTOLIC BLOOD PRESSURE: 117 MMHG | HEIGHT: 70 IN | WEIGHT: 150 LBS | OXYGEN SATURATION: 97 %

## 2019-04-01 DIAGNOSIS — F10.20 UNCOMPLICATED ALCOHOL DEPENDENCE (HCC): ICD-10-CM

## 2019-04-01 DIAGNOSIS — D64.9 ANEMIA, UNSPECIFIED TYPE: ICD-10-CM

## 2019-04-01 DIAGNOSIS — R11.0 NAUSEA: ICD-10-CM

## 2019-04-01 DIAGNOSIS — R53.83 FATIGUE, UNSPECIFIED TYPE: ICD-10-CM

## 2019-04-01 DIAGNOSIS — E78.5 HYPERLIPIDEMIA, UNSPECIFIED HYPERLIPIDEMIA TYPE: ICD-10-CM

## 2019-04-01 DIAGNOSIS — G08 DURAL VENOUS SINUS THROMBOSIS: ICD-10-CM

## 2019-04-01 DIAGNOSIS — R10.13 EPIGASTRIC PAIN: Primary | ICD-10-CM

## 2019-04-01 DIAGNOSIS — R79.1 SUPRATHERAPEUTIC INR: ICD-10-CM

## 2019-04-01 DIAGNOSIS — R10.9 ABDOMINAL PAIN, UNSPECIFIED ABDOMINAL LOCATION: ICD-10-CM

## 2019-04-01 LAB
INR BLD: 6
PT POC: 71.8 SECONDS
VALID INTERNAL CONTROL?: YES

## 2019-04-01 RX ORDER — ONDANSETRON 4 MG/1
TABLET, FILM COATED ORAL
Qty: 30 TAB | Refills: 0 | Status: SHIPPED | OUTPATIENT
Start: 2019-04-01 | End: 2019-04-29 | Stop reason: SDUPTHER

## 2019-04-01 RX ORDER — ATORVASTATIN CALCIUM 40 MG/1
TABLET, FILM COATED ORAL
Qty: 90 TAB | Refills: 3 | Status: SHIPPED | OUTPATIENT
Start: 2019-04-01 | End: 2019-04-03

## 2019-04-01 RX ORDER — ONDANSETRON 4 MG/1
TABLET, FILM COATED ORAL
Qty: 30 TAB | Refills: 0 | Status: CANCELLED | OUTPATIENT
Start: 2019-04-01

## 2019-04-01 RX ORDER — PANTOPRAZOLE SODIUM 40 MG/1
TABLET, DELAYED RELEASE ORAL
Qty: 90 TAB | Refills: 3 | Status: SHIPPED | OUTPATIENT
Start: 2019-04-01 | End: 2019-11-13 | Stop reason: SDUPTHER

## 2019-04-01 RX ORDER — WARFARIN SODIUM 5 MG/1
TABLET ORAL
Qty: 30 TAB | Refills: 5 | Status: CANCELLED | OUTPATIENT
Start: 2019-04-01

## 2019-04-01 NOTE — PROGRESS NOTES
. 
Chief Complaint Patient presents with  Medication Refill Trace Alvarez 1. Have you been to the ER, urgent care clinic since your last visit? Hospitalized since your last visit? no 
 
2. Have you seen or consulted any other health care providers outside of the [facility] since your last visit? Include any pap smears or colon screening. no 
 
. Health Maintenance Due Topic Date Due  Pneumococcal 0-64 years (1 of 1 - PPSV23) 09/21/1987  
 DTaP/Tdap/Td series (1 - Tdap) 09/21/2002 Trace Alvarez [facility]

## 2019-04-01 NOTE — PROGRESS NOTES
HPI 
Clementina Rodriguez is a 41 y.o. male who presents having been last for follow-up for 1.5 years. When I last saw him he had been hospitalized with a dural venous sinus thrombosis. Was placed on anticoagulation. Saw Dr. Evon Brady who suggested anticoagulation for 1 year (should have been stopped 7/2018). Has not been following up with Coumadin checks. Stopped his Coumadin 4 days ago when he ran out and his INR today is 6.0. Continues to drink alcohol. 23 drinks per day. When he drinks too much she gets abdominal pain. Points to epigastric and left lower quadrant. When he eats he can only eat 3 bites before he gets epigastric pain. When he does not take his Protonix if he gets reflux symptoms. Has a history of hemorrhoids requiring banding PMHx: 
Past Medical History:  
Diagnosis Date  Bleeding hemorrhoid  Elevated cholesterol  Hypertension  Pancreatitis Meds:  
Current Outpatient Medications Medication Sig Dispense Refill  pantoprazole (PROTONIX) 40 mg tablet TAKE 1 TABLET BY MOUTH DAILY FOR STOMACH ACID 90 Tab 3  
 atorvastatin (LIPITOR) 40 mg tablet TAKE ONE TABLET BY MOUTH NIGHTLY 90 Tab 3  
 ondansetron hcl (ZOFRAN) 4 mg tablet TAKE 1 TABLET BY MOUTH EVERY EIGHT (8) HOURS AS NEEDED FOR NAUSEA. 30 Tab 0 Allergies: Allergies Allergen Reactions  Peanut Rash Face swelling, scratchy throat, rash Smoker: 
Social History Tobacco Use Smoking Status Current Every Day Smoker  Packs/day: 0.25 Smokeless Tobacco Current User ETOH:  
Social History Substance and Sexual Activity Alcohol Use Yes Comment: one beer per day FH:  
Family History Problem Relation Age of Onset  Hypertension Mother  Breast Cancer Mother  Hypertension Father  Heart Attack Father  No Known Problems Sister ROS:  
As listed in HPI. In addition: 
Constitutional:   No headache, fever, fatigue, weight loss or weight gain Cardiac:    No chest pain Resp:   No cough or shortness of breath Neuro   No loss of consciousness, dizziness, seizures Physical Exam: 
Blood pressure 117/76, pulse 93, temperature 98.6 °F (37 °C), temperature source Oral, resp. rate 12, height 5' 10\" (1.778 m), weight 150 lb (68 kg), SpO2 97 %. GEN: No apparent distress. Alert and oriented and responds to all questions appropriately. NEUROLOGIC:  No focal neurologic deficits. Strength and sensation grossly intact. Coordination and gait grossly intact. EXT: Well perfused. No edema. SKIN: No obvious rashes. Abdomen soft nontender nondistended with normoactive bowel sounds Lungs clear to auscultation bilaterally CV regular rate and rhythm no murmur Assessment and Plan Supratherapeutic INR 
6.0 having stopped Coumadin 4 days ago. Must have been pretty high No sign of bleeding History dural venous sinus thrombosis Hematology suggested he be on Coumadin for 1 year (to be stopped 7/2018. Go ahead and stop this now Anemia History of this, blood in the toilet some days but has not seen this for more than a month. Cannot tolerate iron supplement because of GI upset Has been craving ice Alcohol dependence Has been told multiple times to quit drinking because of its effect on his body. I told him this again today Hypertriglyceridemia Was 3000 when last checked. Check this again today. Has been taking Lipitor but ran out a few days ago Epigastric pain Lipase LFTs Refer back to GI Could be stomach, duodenum, pancreas related. Could even be constipation but focus on upper GI first 
 
Says he is getting a little dizzy when upright. Vital signs are fine Follow-up based on labs Labs reviewed. Extremely anemic. Hemoglobin is \"< 6\". Very low iron stores. LFTs remain elevated AST> ALT. Attempted to call but no answer. Will keep trying.   Needs to go to the emergency room to consider transfusion and possible vitamin K depending on INR 
 
  ICD-10-CM ICD-9-CM 1. Epigastric pain R10.13 789.06 pantoprazole (PROTONIX) 40 mg tablet METABOLIC PANEL, COMPREHENSIVE  
   CBC WITH AUTOMATED DIFF  
   HEMOGLOBIN A1C WITH EAG  
   LIPASE 2. Dural venous sinus thrombosis G08 325 AMB POC PT/INR  
3. Uncomplicated alcohol dependence ([facility] [address] F10.20 303.90   
4. Hyperlipidemia, unspecified hyperlipidemia type E78.5 272.4 atorvastatin (LIPITOR) 40 mg tablet LIPID PANEL 5. Supratherapeutic INR R79.1 790.92   
6. Abdominal pain, unspecified abdominal location R10.9 789.00 REFERRAL TO GASTROENTEROLOGY 7. Nausea R11.0 787.02 ondansetron hcl (ZOFRAN) 4 mg tablet 8. Fatigue, unspecified type R53.83 780.79 TSH 3RD GENERATION 9. Anemia, unspecified type D64.9 285.9 FERRITIN  
   IRON PROFILE  
 
 
AVS given. Pt expressed understanding of instructions

## 2019-04-02 ENCOUNTER — TELEPHONE (OUTPATIENT)
Dept: FAMILY MEDICINE CLINIC | Age: 39
End: 2019-04-02

## 2019-04-02 LAB
ALBUMIN SERPL-MCNC: 4.3 G/DL (ref 3.5–5.5)
ALBUMIN/GLOB SERPL: 1.8 {RATIO} (ref 1.2–2.2)
ALP SERPL-CCNC: 66 IU/L (ref 39–117)
ALT SERPL-CCNC: 55 IU/L (ref 0–44)
AST SERPL-CCNC: 119 IU/L (ref 0–40)
BASOPHILS # BLD AUTO: 0 X10E3/UL (ref 0–0.2)
BASOPHILS NFR BLD AUTO: 1 %
BILIRUB SERPL-MCNC: 0.4 MG/DL (ref 0–1.2)
BUN SERPL-MCNC: 6 MG/DL (ref 6–20)
BUN/CREAT SERPL: 8 (ref 9–20)
CALCIUM SERPL-MCNC: 8.9 MG/DL (ref 8.7–10.2)
CHLORIDE SERPL-SCNC: 95 MMOL/L (ref 96–106)
CO2 SERPL-SCNC: 24 MMOL/L (ref 20–29)
CREAT SERPL-MCNC: 0.73 MG/DL (ref 0.76–1.27)
EOSINOPHIL # BLD AUTO: 0.1 X10E3/UL (ref 0–0.4)
EOSINOPHIL NFR BLD AUTO: 2 %
ERYTHROCYTE [DISTWIDTH] IN BLOOD BY AUTOMATED COUNT: 29 % (ref 12.3–15.4)
EST. AVERAGE GLUCOSE BLD GHB EST-MCNC: 105 MG/DL
FERRITIN SERPL-MCNC: 12 NG/ML (ref 30–400)
GLOBULIN SER CALC-MCNC: 2.4 G/DL (ref 1.5–4.5)
GLUCOSE SERPL-MCNC: 95 MG/DL (ref 65–99)
HBA1C MFR BLD: 5.3 % (ref 4.8–5.6)
HCT VFR BLD AUTO: 21.4 % (ref 37.5–51)
HGB BLD-MCNC: 6 G/DL (ref 13–17.7)
IMM GRANULOCYTES # BLD AUTO: 0 X10E3/UL (ref 0–0.1)
IMM GRANULOCYTES NFR BLD AUTO: 1 %
IRON SATN MFR SERPL: 3 % (ref 15–55)
IRON SERPL-MCNC: 9 UG/DL (ref 38–169)
LIPASE SERPL-CCNC: 21 U/L (ref 13–78)
LYMPHOCYTES # BLD AUTO: 0.9 X10E3/UL (ref 0.7–3.1)
LYMPHOCYTES NFR BLD AUTO: 15 %
MCH RBC QN AUTO: 19.7 PG (ref 26.6–33)
MCHC RBC AUTO-ENTMCNC: 28 G/DL (ref 31.5–35.7)
MCV RBC AUTO: 70 FL (ref 79–97)
MONOCYTES # BLD AUTO: 0.8 X10E3/UL (ref 0.1–0.9)
MONOCYTES NFR BLD AUTO: 14 %
MORPHOLOGY BLD-IMP: ABNORMAL
NEUTROPHILS # BLD AUTO: 3.9 X10E3/UL (ref 1.4–7)
NEUTROPHILS NFR BLD AUTO: 67 %
NRBC BLD AUTO-RTO: 3 % (ref 0–0)
PLATELET # BLD AUTO: 213 X10E3/UL (ref 150–379)
POTASSIUM SERPL-SCNC: 4 MMOL/L (ref 3.5–5.2)
PROT SERPL-MCNC: 6.7 G/DL (ref 6–8.5)
RBC # BLD AUTO: 3.05 X10E6/UL (ref 4.14–5.8)
SODIUM SERPL-SCNC: 135 MMOL/L (ref 134–144)
TIBC SERPL-MCNC: 355 UG/DL (ref 250–450)
TSH SERPL DL<=0.005 MIU/L-ACNC: 3.75 UIU/ML (ref 0.45–4.5)
UIBC SERPL-MCNC: 346 UG/DL (ref 111–343)
WBC # BLD AUTO: 5.8 X10E3/UL (ref 3.4–10.8)

## 2019-04-02 NOTE — TELEPHONE ENCOUNTER
Labs reviewed. Extremely anemic. Hemoglobin is \"< 6\". Needs to go to the emergency room to consider transfusion. Called patient's number. Voicemail box is full. Called wife's number (on disclosure) and had to leave a voice message requesting a call back. I will be on-call this evening.   These are the only 2 numbers I can find in the chart

## 2019-04-03 ENCOUNTER — DOCUMENTATION ONLY (OUTPATIENT)
Dept: FAMILY MEDICINE CLINIC | Age: 39
End: 2019-04-03

## 2019-04-03 ENCOUNTER — APPOINTMENT (OUTPATIENT)
Dept: CT IMAGING | Age: 39
DRG: 378 | End: 2019-04-03
Attending: PHYSICIAN ASSISTANT
Payer: COMMERCIAL

## 2019-04-03 ENCOUNTER — HOSPITAL ENCOUNTER (INPATIENT)
Age: 39
LOS: 2 days | Discharge: HOME | DRG: 378 | End: 2019-04-05
Attending: EMERGENCY MEDICINE | Admitting: INTERNAL MEDICINE
Payer: COMMERCIAL

## 2019-04-03 DIAGNOSIS — D64.9 LOW HEMOGLOBIN: Primary | ICD-10-CM

## 2019-04-03 DIAGNOSIS — R79.1 ELEVATED INR: ICD-10-CM

## 2019-04-03 PROBLEM — K21.9 GERD (GASTROESOPHAGEAL REFLUX DISEASE): Status: ACTIVE | Noted: 2019-04-03

## 2019-04-03 PROBLEM — K92.1 HEMATOCHEZIA: Status: ACTIVE | Noted: 2019-04-03

## 2019-04-03 PROBLEM — Z79.01 CURRENT USE OF LONG TERM ANTICOAGULATION: Status: ACTIVE | Noted: 2019-04-03

## 2019-04-03 PROBLEM — Z79.1 NSAID LONG-TERM USE: Status: ACTIVE | Noted: 2019-04-03

## 2019-04-03 LAB
ALBUMIN SERPL-MCNC: 4.1 G/DL (ref 3.5–5)
ALBUMIN/GLOB SERPL: 1.1 {RATIO} (ref 1.1–2.2)
ALP SERPL-CCNC: 95 U/L (ref 45–117)
ALT SERPL-CCNC: 75 U/L (ref 12–78)
ANION GAP SERPL CALC-SCNC: 9 MMOL/L (ref 5–15)
APPEARANCE UR: CLEAR
AST SERPL-CCNC: 145 U/L (ref 15–37)
BACTERIA URNS QL MICRO: NEGATIVE /HPF
BASOPHILS # BLD: 0 K/UL (ref 0–0.1)
BASOPHILS NFR BLD: 0 % (ref 0–1)
BILIRUB SERPL-MCNC: 0.4 MG/DL (ref 0.2–1)
BILIRUB UR QL: NEGATIVE
BUN SERPL-MCNC: 6 MG/DL (ref 6–20)
BUN/CREAT SERPL: 8 (ref 12–20)
CALCIUM SERPL-MCNC: 9.1 MG/DL (ref 8.5–10.1)
CHLORIDE SERPL-SCNC: 98 MMOL/L (ref 97–108)
CO2 SERPL-SCNC: 27 MMOL/L (ref 21–32)
COLOR UR: NORMAL
CREAT SERPL-MCNC: 0.78 MG/DL (ref 0.7–1.3)
DIFFERENTIAL METHOD BLD: ABNORMAL
EOSINOPHIL # BLD: 0 K/UL (ref 0–0.4)
EOSINOPHIL NFR BLD: 1 % (ref 0–7)
EPITH CASTS URNS QL MICRO: NORMAL /LPF
ERYTHROCYTE [DISTWIDTH] IN BLOOD BY AUTOMATED COUNT: 30.8 % (ref 11.5–14.5)
GLOBULIN SER CALC-MCNC: 3.9 G/DL (ref 2–4)
GLUCOSE SERPL-MCNC: 93 MG/DL (ref 65–100)
GLUCOSE UR STRIP.AUTO-MCNC: NEGATIVE MG/DL
HCT VFR BLD AUTO: 20.2 % (ref 36.6–50.3)
HEMOCCULT STL QL: NEGATIVE
HGB BLD-MCNC: 6 G/DL (ref 12.1–17)
HGB UR QL STRIP: NEGATIVE
HYALINE CASTS URNS QL MICRO: NORMAL /LPF (ref 0–5)
IMM GRANULOCYTES # BLD AUTO: 0.1 K/UL (ref 0–0.04)
IMM GRANULOCYTES NFR BLD AUTO: 2 % (ref 0–0.5)
INR PPP: 3 (ref 0.9–1.1)
KETONES UR QL STRIP.AUTO: NEGATIVE MG/DL
LEUKOCYTE ESTERASE UR QL STRIP.AUTO: NEGATIVE
LYMPHOCYTES # BLD: 0.6 K/UL (ref 0.8–3.5)
LYMPHOCYTES NFR BLD: 16 % (ref 12–49)
MCH RBC QN AUTO: 18.9 PG (ref 26–34)
MCHC RBC AUTO-ENTMCNC: 29.7 G/DL (ref 30–36.5)
MCV RBC AUTO: 63.7 FL (ref 80–99)
MONOCYTES # BLD: 0.4 K/UL (ref 0–1)
MONOCYTES NFR BLD: 10 % (ref 5–13)
NEUTS SEG # BLD: 2.9 K/UL (ref 1.8–8)
NEUTS SEG NFR BLD: 71 % (ref 32–75)
NITRITE UR QL STRIP.AUTO: NEGATIVE
NRBC # BLD: 0.08 K/UL (ref 0–0.01)
NRBC BLD-RTO: 2 PER 100 WBC
PH UR STRIP: 6 [PH] (ref 5–8)
PLATELET # BLD AUTO: 292 K/UL (ref 150–400)
PMV BLD AUTO: ABNORMAL FL (ref 8.9–12.9)
POTASSIUM SERPL-SCNC: 3.5 MMOL/L (ref 3.5–5.1)
PROT SERPL-MCNC: 8 G/DL (ref 6.4–8.2)
PROT UR STRIP-MCNC: NEGATIVE MG/DL
PROTHROMBIN TIME: 29.3 SEC (ref 9–11.1)
RBC # BLD AUTO: 3.17 M/UL (ref 4.1–5.7)
RBC #/AREA URNS HPF: NORMAL /HPF (ref 0–5)
RBC MORPH BLD: ABNORMAL
SODIUM SERPL-SCNC: 134 MMOL/L (ref 136–145)
SP GR UR REFRACTOMETRY: 1.01 (ref 1–1.03)
UA: UC IF INDICATED,UAUC: NORMAL
UROBILINOGEN UR QL STRIP.AUTO: 0.2 EU/DL (ref 0.2–1)
WBC # BLD AUTO: 4 K/UL (ref 4.1–11.1)
WBC URNS QL MICRO: NORMAL /HPF (ref 0–4)

## 2019-04-03 PROCEDURE — 30233N1 TRANSFUSION OF NONAUTOLOGOUS RED BLOOD CELLS INTO PERIPHERAL VEIN, PERCUTANEOUS APPROACH: ICD-10-PCS | Performed by: EMERGENCY MEDICINE

## 2019-04-03 PROCEDURE — 74011250636 HC RX REV CODE- 250/636: Performed by: INTERNAL MEDICINE

## 2019-04-03 PROCEDURE — 99285 EMERGENCY DEPT VISIT HI MDM: CPT

## 2019-04-03 PROCEDURE — 80053 COMPREHEN METABOLIC PANEL: CPT

## 2019-04-03 PROCEDURE — 77030013169 SET IV BLD ICUM -A

## 2019-04-03 PROCEDURE — 74011250637 HC RX REV CODE- 250/637: Performed by: PHYSICIAN ASSISTANT

## 2019-04-03 PROCEDURE — 81001 URINALYSIS AUTO W/SCOPE: CPT

## 2019-04-03 PROCEDURE — 85025 COMPLETE CBC W/AUTO DIFF WBC: CPT

## 2019-04-03 PROCEDURE — 86923 COMPATIBILITY TEST ELECTRIC: CPT

## 2019-04-03 PROCEDURE — 86900 BLOOD TYPING SEROLOGIC ABO: CPT

## 2019-04-03 PROCEDURE — C9113 INJ PANTOPRAZOLE SODIUM, VIA: HCPCS | Performed by: INTERNAL MEDICINE

## 2019-04-03 PROCEDURE — 85018 HEMOGLOBIN: CPT

## 2019-04-03 PROCEDURE — 74011000250 HC RX REV CODE- 250: Performed by: PHYSICIAN ASSISTANT

## 2019-04-03 PROCEDURE — 82272 OCCULT BLD FECES 1-3 TESTS: CPT

## 2019-04-03 PROCEDURE — 36415 COLL VENOUS BLD VENIPUNCTURE: CPT

## 2019-04-03 PROCEDURE — P9016 RBC LEUKOCYTES REDUCED: HCPCS

## 2019-04-03 PROCEDURE — 74011636320 HC RX REV CODE- 636/320: Performed by: INTERNAL MEDICINE

## 2019-04-03 PROCEDURE — 36430 TRANSFUSION BLD/BLD COMPNT: CPT

## 2019-04-03 PROCEDURE — 85610 PROTHROMBIN TIME: CPT

## 2019-04-03 PROCEDURE — 65660000000 HC RM CCU STEPDOWN

## 2019-04-03 PROCEDURE — 94760 N-INVAS EAR/PLS OXIMETRY 1: CPT

## 2019-04-03 PROCEDURE — 74177 CT ABD & PELVIS W/CONTRAST: CPT

## 2019-04-03 RX ORDER — LORAZEPAM 1 MG/1
4 TABLET ORAL
Status: DISCONTINUED | OUTPATIENT
Start: 2019-04-03 | End: 2019-04-05 | Stop reason: HOSPADM

## 2019-04-03 RX ORDER — SODIUM CHLORIDE 0.9 % (FLUSH) 0.9 %
10 SYRINGE (ML) INJECTION
Status: ACTIVE | OUTPATIENT
Start: 2019-04-03 | End: 2019-04-04

## 2019-04-03 RX ORDER — SODIUM CHLORIDE 0.9 % (FLUSH) 0.9 %
5-40 SYRINGE (ML) INJECTION EVERY 8 HOURS
Status: DISCONTINUED | OUTPATIENT
Start: 2019-04-03 | End: 2019-04-05 | Stop reason: HOSPADM

## 2019-04-03 RX ORDER — SODIUM CHLORIDE 0.9 % (FLUSH) 0.9 %
5-40 SYRINGE (ML) INJECTION AS NEEDED
Status: DISCONTINUED | OUTPATIENT
Start: 2019-04-03 | End: 2019-04-05 | Stop reason: HOSPADM

## 2019-04-03 RX ORDER — SODIUM CHLORIDE 9 MG/ML
250 INJECTION, SOLUTION INTRAVENOUS AS NEEDED
Status: DISCONTINUED | OUTPATIENT
Start: 2019-04-03 | End: 2019-04-05 | Stop reason: HOSPADM

## 2019-04-03 RX ORDER — SODIUM CHLORIDE 9 MG/ML
75 INJECTION, SOLUTION INTRAVENOUS CONTINUOUS
Status: DISCONTINUED | OUTPATIENT
Start: 2019-04-03 | End: 2019-04-05 | Stop reason: HOSPADM

## 2019-04-03 RX ORDER — CETIRIZINE HCL 10 MG
10 TABLET ORAL DAILY
Status: DISCONTINUED | OUTPATIENT
Start: 2019-04-04 | End: 2019-04-05 | Stop reason: HOSPADM

## 2019-04-03 RX ORDER — PANTOPRAZOLE SODIUM 40 MG/10ML
40 INJECTION, POWDER, LYOPHILIZED, FOR SOLUTION INTRAVENOUS
Status: COMPLETED | OUTPATIENT
Start: 2019-04-03 | End: 2019-04-03

## 2019-04-03 RX ORDER — ONDANSETRON 2 MG/ML
4 INJECTION INTRAMUSCULAR; INTRAVENOUS
Status: DISCONTINUED | OUTPATIENT
Start: 2019-04-03 | End: 2019-04-05 | Stop reason: HOSPADM

## 2019-04-03 RX ORDER — LORAZEPAM 1 MG/1
2 TABLET ORAL
Status: DISCONTINUED | OUTPATIENT
Start: 2019-04-03 | End: 2019-04-05 | Stop reason: HOSPADM

## 2019-04-03 RX ORDER — CETIRIZINE HCL 10 MG
10 TABLET ORAL DAILY
COMMUNITY
End: 2020-02-27

## 2019-04-03 RX ORDER — SODIUM CHLORIDE 0.9 % (FLUSH) 0.9 %
10 SYRINGE (ML) INJECTION
Status: COMPLETED | OUTPATIENT
Start: 2019-04-03 | End: 2019-04-03

## 2019-04-03 RX ORDER — ACETAMINOPHEN 325 MG/1
650 TABLET ORAL
Status: DISCONTINUED | OUTPATIENT
Start: 2019-04-03 | End: 2019-04-05 | Stop reason: HOSPADM

## 2019-04-03 RX ADMIN — Medication 10 ML: at 20:33

## 2019-04-03 RX ADMIN — SODIUM CHLORIDE 75 ML/HR: 900 INJECTION, SOLUTION INTRAVENOUS at 18:08

## 2019-04-03 RX ADMIN — LIDOCAINE HYDROCHLORIDE 40 ML: 20 SOLUTION ORAL; TOPICAL at 15:20

## 2019-04-03 RX ADMIN — IOPAMIDOL 100 ML: 755 INJECTION, SOLUTION INTRAVENOUS at 20:33

## 2019-04-03 RX ADMIN — POLYETHYLENE GLYCOL 3350, SODIUM SULFATE ANHYDROUS, SODIUM BICARBONATE, SODIUM CHLORIDE, POTASSIUM CHLORIDE 4000 ML: 236; 22.74; 6.74; 5.86; 2.97 POWDER, FOR SOLUTION ORAL at 23:20

## 2019-04-03 RX ADMIN — Medication 10 ML: at 18:13

## 2019-04-03 RX ADMIN — IRON SUCROSE 100 MG: 20 INJECTION, SOLUTION INTRAVENOUS at 18:08

## 2019-04-03 RX ADMIN — PANTOPRAZOLE SODIUM 40 MG: 40 INJECTION, POWDER, FOR SOLUTION INTRAVENOUS at 18:07

## 2019-04-03 RX ADMIN — Medication 5 ML: at 23:05

## 2019-04-03 NOTE — ED PROVIDER NOTES
EMERGENCY DEPARTMENT HISTORY AND PHYSICAL EXAM 
 
 
Date: 4/3/2019 Patient Name: Jennifer Hernández History of Presenting Illness Chief Complaint Patient presents with  Abnormal Lab Results Ambulatory for low hemoglobin Pt states he had check up on Monday with PCP and called last night for low hemoglobin of 6 Pt reports \"sometimes\" having melena History Provided By: Patient HPI: Jennifer Hernández, 41 y.o. male with PMHx  for hypertension, hemorrhage, pancreatitis,, presents to the ED after having been sent over from his PCP due to low hemoglobin. Patient went to his primary care doctor on 4/1/19 for epigastric pain. Blood work was done at the time of and resulted this morning. Patient's INR was also elevated at that time. Patient was placed on Coumadin in 2017 due to dural sinus thrombosis. He was supposed to only be on it for 1 year but patient continued to take the medicine, he had no Coumadin checks or follow up with his PCP for whole year and a half. Patient does admit to external and internal hemorrhoids that come and go. He states that he only bleeds during a full moon, per wife its a like a menstrual cycle. he denies any shortness of breath, dizziness, blood in the stool or urine, abd pain,  fevers, chills, nausea, vomiting, chest pain, shortness of breath, headache, rash, diarrhea, sweating or weight loss. All other ROS negative at this time Pt is in no acute distress and is speaking in full sentences There are no other complaints, changes, or physical findings at this time. Social History Tobacco Use  Smoking status: Current Every Day Smoker Packs/day: 0.25  Smokeless tobacco: Current User Substance Use Topics  Alcohol use: Yes Comment: one beer per day  Drug use: Yes Types: Marijuana Allergies Allergen Reactions  Peanut Rash Face swelling, scratchy throat, rash PCP: Bao Choudhary MD 
 
 No current facility-administered medications on file prior to encounter. Current Outpatient Medications on File Prior to Encounter Medication Sig Dispense Refill  cetirizine (ZYRTEC) 10 mg tablet Take 10 mg by mouth daily.  pantoprazole (PROTONIX) 40 mg tablet TAKE 1 TABLET BY MOUTH DAILY FOR STOMACH ACID 90 Tab 3  
 ondansetron hcl (ZOFRAN) 4 mg tablet TAKE 1 TABLET BY MOUTH EVERY EIGHT (8) HOURS AS NEEDED FOR NAUSEA. 30 Tab 0 Past History Past Medical History: 
Past Medical History:  
Diagnosis Date  Bleeding hemorrhoid  Elevated cholesterol  Hypertension  Pancreatitis Past Surgical History: 
Past Surgical History:  
Procedure Laterality Date  HX GI    
 HX HEMORRHOIDECTOMY 2013 Family History: 
Family History Problem Relation Age of Onset  Hypertension Mother  Breast Cancer Mother  Hypertension Father  Heart Attack Father  No Known Problems Sister Social History: 
Social History Tobacco Use  Smoking status: Current Every Day Smoker Packs/day: 0.25  Smokeless tobacco: Current User Substance Use Topics  Alcohol use: Yes Comment: one beer per day  Drug use: Yes Types: Marijuana Allergies: Allergies Allergen Reactions  Peanut Rash Face swelling, scratchy throat, rash Review of Systems Review of Systems Constitutional: Negative. Negative for chills and fever. HENT: Negative. Eyes: Negative. Respiratory: Negative. Negative for shortness of breath. Cardiovascular: Negative. Negative for chest pain. Gastrointestinal: Negative. Negative for abdominal pain, diarrhea, nausea and vomiting. Endocrine: Negative. Genitourinary: Negative. Musculoskeletal: Negative. Skin: Negative. Allergic/Immunologic: Negative. Neurological: Negative. Negative for headaches. Hematological: Negative. Psychiatric/Behavioral: Negative. All other systems reviewed and are negative. Physical Exam  
Physical Exam  
Constitutional: He is oriented to person, place, and time. He appears well-developed and well-nourished. No distress. HENT:  
Head: Normocephalic and atraumatic. Right Ear: External ear normal.  
Left Ear: External ear normal.  
Nose: Nose normal.  
Mouth/Throat: Oropharynx is clear and moist.  
Eyes: Pupils are equal, round, and reactive to light. Conjunctivae and EOM are normal.  
Neck: Normal range of motion. Neck supple. Cardiovascular: Normal rate, regular rhythm, normal heart sounds and intact distal pulses. Pulmonary/Chest: Effort normal and breath sounds normal. No respiratory distress. He has no wheezes. He has no rales. Abdominal: Soft. Bowel sounds are normal. He exhibits no distension. There is no tenderness. There is no rebound, no guarding, no CVA tenderness, no tenderness at McBurney's point and negative Trujillo's sign. Nontender with light and deep palpation. Genitourinary: Rectal exam shows no external hemorrhoid, no internal hemorrhoid, no fissure, no mass, no tenderness, anal tone normal and guaiac negative stool. Musculoskeletal: Normal range of motion. Lymphadenopathy:  
  He has no cervical adenopathy. Neurological: He is alert and oriented to person, place, and time. He has normal reflexes. He displays normal reflexes. No cranial nerve deficit. He exhibits normal muscle tone. Coordination normal.  
Skin: No rash noted. He is not diaphoretic. Psychiatric: He has a normal mood and affect. His behavior is normal. Judgment and thought content normal.  
Nursing note and vitals reviewed. Diagnostic Study Results Labs - Recent Results (from the past 12 hour(s)) CBC WITH AUTOMATED DIFF Collection Time: 04/03/19 10:18 AM  
Result Value Ref Range WBC 4.0 (L) 4.1 - 11.1 K/uL  
 RBC 3.17 (L) 4.10 - 5.70 M/uL HGB 6.0 (L) 12.1 - 17.0 g/dL HCT 20.2 (L) 36.6 - 50.3 % MCV 63.7 (L) 80.0 - 99.0 FL  
 MCH 18.9 (L) 26.0 - 34.0 PG  
 MCHC 29.7 (L) 30.0 - 36.5 g/dL RDW 30.8 (H) 11.5 - 14.5 % PLATELET 414 486 - 574 K/uL MPV ABNORMAL 8.9 - 12.9 FL  
 NRBC 2.0 (H) 0  WBC ABSOLUTE NRBC 0.08 (H) 0.00 - 0.01 K/uL NEUTROPHILS 71 32 - 75 % LYMPHOCYTES 16 12 - 49 % MONOCYTES 10 5 - 13 % EOSINOPHILS 1 0 - 7 % BASOPHILS 0 0 - 1 % IMMATURE GRANULOCYTES 2 (H) 0.0 - 0.5 % ABS. NEUTROPHILS 2.9 1.8 - 8.0 K/UL  
 ABS. LYMPHOCYTES 0.6 (L) 0.8 - 3.5 K/UL  
 ABS. MONOCYTES 0.4 0.0 - 1.0 K/UL  
 ABS. EOSINOPHILS 0.0 0.0 - 0.4 K/UL  
 ABS. BASOPHILS 0.0 0.0 - 0.1 K/UL  
 ABS. IMM. GRANS. 0.1 (H) 0.00 - 0.04 K/UL  
 DF AUTOMATED    
 RBC COMMENTS MICROCYTOSIS 2+ 
    
 RBC COMMENTS HYPOCHROMIA 2+ 
    
 RBC COMMENTS POLYCHROMASIA 1+ 
    
 RBC COMMENTS TEARDROP CELLS 
PRESENT 
    
 RBC COMMENTS POIKILOCYTOSIS 
PRESENT 
    
 RBC COMMENTS TARGET CELLS 
PRESENT 
    
METABOLIC PANEL, COMPREHENSIVE Collection Time: 04/03/19 10:18 AM  
Result Value Ref Range Sodium 134 (L) 136 - 145 mmol/L Potassium 3.5 3.5 - 5.1 mmol/L Chloride 98 97 - 108 mmol/L  
 CO2 27 21 - 32 mmol/L Anion gap 9 5 - 15 mmol/L Glucose 93 65 - 100 mg/dL BUN 6 6 - 20 MG/DL Creatinine 0.78 0.70 - 1.30 MG/DL  
 BUN/Creatinine ratio 8 (L) 12 - 20 GFR est AA >60 >60 ml/min/1.73m2 GFR est non-AA >60 >60 ml/min/1.73m2 Calcium 9.1 8.5 - 10.1 MG/DL Bilirubin, total 0.4 0.2 - 1.0 MG/DL  
 ALT (SGPT) 75 12 - 78 U/L  
 AST (SGOT) 145 (H) 15 - 37 U/L Alk. phosphatase 95 45 - 117 U/L Protein, total 8.0 6.4 - 8.2 g/dL Albumin 4.1 3.5 - 5.0 g/dL Globulin 3.9 2.0 - 4.0 g/dL A-G Ratio 1.1 1.1 - 2.2 TYPE + CROSSMATCH Collection Time: 04/03/19 11:30 AM  
Result Value Ref Range Crossmatch Expiration 04/06/2019 ABO/Rh(D) O POSITIVE Antibody screen NEG Unit number [id number] Blood component type RC LR,2 Unit division 00 Status of unit ISSUED Crossmatch result Compatible Unit number [id number] Blood component type RC LR,2 Unit division 00 Status of unit ALLOCATED Crossmatch result Compatible OCCULT BLOOD, STOOL Collection Time: 04/03/19 12:01 PM  
Result Value Ref Range Occult blood, stool NEGATIVE  NEG PROTHROMBIN TIME + INR Collection Time: 04/03/19  1:05 PM  
Result Value Ref Range INR 3.0 (H) 0.9 - 1.1 Prothrombin time 29.3 (H) 9.0 - 11.1 sec URINALYSIS W/ REFLEX CULTURE Collection Time: 04/03/19  1:55 PM  
Result Value Ref Range Color YELLOW/STRAW Appearance CLEAR CLEAR Specific gravity 1.009 1.003 - 1.030    
 pH (UA) 6.0 5.0 - 8.0 Protein NEGATIVE  NEG mg/dL Glucose NEGATIVE  NEG mg/dL Ketone NEGATIVE  NEG mg/dL Bilirubin NEGATIVE  NEG Blood NEGATIVE  NEG Urobilinogen 0.2 0.2 - 1.0 EU/dL Nitrites NEGATIVE  NEG Leukocyte Esterase NEGATIVE  NEG    
 WBC 0-4 0 - 4 /hpf  
 RBC 0-5 0 - 5 /hpf Epithelial cells FEW FEW /lpf Bacteria NEGATIVE  NEG /hpf  
 UA:UC IF INDICATED CULTURE NOT INDICATED BY UA RESULT CNI Hyaline cast 0-2 0 - 5 /lpf Radiologic Studies -  
CT ABD PELV W CONT    (Results Pending) CT Results  (Last 48 hours) None CXR Results  (Last 48 hours) None Medical Decision Making I am the first provider for this patient. I reviewed the vital signs, available nursing notes, past medical history, past surgical history, family history and social history. Vital Signs-Reviewed the patient's vital signs. Patient Vitals for the past 12 hrs: 
 Temp Pulse Resp BP SpO2  
04/03/19 1548 98.8 °F (37.1 °C) 82 14 120/77   
04/03/19 1529 98.4 °F (36.9 °C) 78 18 115/73   
04/03/19 1520 98.5 °F (36.9 °C) 84 18 109/66   
04/03/19 1508 98.5 °F (36.9 °C) 81 20 110/70 100 % 04/03/19 1500  85 20 110/70 100 % 04/03/19 1445  81 22 111/68 100 % 04/03/19 1430  81 19 114/71 100 % 04/03/19 1415  81 20 109/64 100 % 04/03/19 1400  80 17 108/63 100 % 04/03/19 1345  84 20 113/70 100 % 04/03/19 1330  85 18 109/68 100 % 04/03/19 1315  86 18 108/64 100 % 04/03/19 1300  88 19 115/67 99 % 04/03/19 1245  86 20 109/61 100 % 04/03/19 1223     100 % 04/03/19 1013 98.1 °F (36.7 °C) 92 16 128/66 100 % Records Reviewed: Nursing Notes, Old Medical Records, Previous Radiology Studies and Previous Laboratory Studies Provider Notes (Medical Decision Making): Gilberto Roberts DDx: upper gi bleed 2/2 PUD, Esophageal varices; Iron intake, internal and external hemorrhoids, Pepto bismol. Will get labs, T+S, and treat anemia as needed. CONSULT NOTE:  
2:50 PM 
PAULINA Ibanez  spoke with Love Bautista, Specialty: Hospitalist  
Discussed pt's hx, disposition, and available diagnostic and imaging results. Reviewed care plans. Consultant agrees with plans of admission CONSULT NOTE:  
2:30 PM 
PAULINA Ibanez  spoke with Bárbara Hess Specialty: GI 
Discussed pt's hx, disposition, and available diagnostic and imaging results. Reviewed care plans. Consultant will follow pt once he is admitted . ED Course:  
Initial assessment performed. The patients presenting problems have been discussed, and they are in agreement with the care plan formulated and outlined with them. I have encouraged them to ask questions as they arise throughout their visit. Disposition: 
Admit Diagnosis Clinical Impression: 1. Low hemoglobin 2.  Elevated INR

## 2019-04-03 NOTE — ED NOTES
TRANSFER - OUT REPORT: 
 
Verbal report given to Theresa Powell(name) on Valerie Hannon  being transferred to [facility] (unit) for routine progression of care Report consisted of patients Situation, Background, Assessment and  
Recommendations(SBAR). Information from the following report(s) SBAR, ED Summary, [facility] and Recent Results was reviewed with the receiving nurse. Lines:  
Peripheral IV 04/03/19 Left Antecubital (Active) Site Assessment Clean, dry, & intact 4/3/2019 11:52 AM  
Phlebitis Assessment 0 4/3/2019 11:52 AM  
Infiltration Assessment 0 4/3/2019 11:52 AM  
Dressing Status Clean, dry, & intact 4/3/2019 11:52 AM  
Dressing Type Transparent 4/3/2019 11:52 AM  
Hub Color/Line Status Pink 4/3/2019 11:52 AM  
  
 
Opportunity for questions and clarification was provided.

## 2019-04-03 NOTE — TELEPHONE ENCOUNTER
verified. Patient notified to go to the ER related to extreme anemia. Understanding verbalized and he will go to the ER today.

## 2019-04-03 NOTE — H&P
Hospitalist Admission NoteNAME: Irwin Yost :  1980 MRN:  [id number] Date/Time:  4/3/2019 4:39 PM 
 
Patient PCP: Bassem Tavares MD 
________________________________________________________________________ My assessment of this patient's clinical condition and my plan of care is as follows. Assessment / Plan: 
Severe anemia suspect GI losses due to supratherapeutic INR from Coumadin Baseline hemoglobin is around 12 and currently hemoglobin is 6 He does report intermittent bleeding in the stool Iron profile done few days ago shows sever iron def anemia so will give iv iron. Start Protonix 40 mg IV every 12 Check hemoglobin every 8 GI has been consulted Keep n.p.o. from midnight for possible EGD in a.m. tomorrow next Transfuse to keep hemoglobin more than 7 Chronic alcoholism with concern for alcohol withdrawal 
Reports drinking 6 pack of beers every day Start Ativan with alcohol protocol Start banana bag 
Counseled regarding alcohol cessation History of dural venous sinus thrombosis on anticoagulation Acquired coagulopathy due to warfarin He was supposed to be on warfarin for 1 year and must have been discontinued after that but has been taking INR is 3.0 Discontinue warfarin Recheck INR in a.m. Acute transaminitis AST is elevated at 145 likely related to alcohol INR is 3.0 but he is on Coumadin so hepatitis discriminant score unreliable Check CMP in a.m. Code Status: full Surrogate Decision Maker: Wife Genny Winston DVT Prophylaxis: [facility] GI Prophylaxis: not indicated Baseline: From home independent Subjective: CHIEF COMPLAINT: Low Hemoglobin HISTORY OF PRESENT ILLNESS:    
This is a 81-year-old male with past medical history of chronic alcoholism, pancreatitis, dural sinus venous thrombosis on anticoagulation is coming to the hospital after he was told by his primary care physician that he has low hemoglobin and is advised to go to the emergency department. [facility] reports that he was diagnosed to have dural venous sinus thrombosis about 1 and half years ago and was placed on Coumadin for about 1 year and was supposed to be stopped after that but patient continued taking Coumadin and did not get his INR checked R see his primary care physician in the last 6 months.  Reports feeling weak in general involving both his arms and legs.  He also reports generalized abdominal pain which is mild, 2 x 10, nonradiating, without any aggravating or relieving factors.  He reports he sometimes has bright red blood in the stool intermittently. [facility] was having dark stool while he was on iron supplements but he stopped them about 6 months ago. Karma Mass not report any chest pain or shortness of breath.  He reports drinking about a sixpack of beers every day. On arrival to the hospital his vital signs were within normal limits.  Lab work showed a hemoglobin of 6.0.  INR was 3.0.  BMP showed a AST of 145.  1 unit of PRBC was given a GI consultation was placed. We were asked to admit for work up and evaluation of the above problems. Past Medical History:  
Diagnosis Date  Bleeding hemorrhoid  Elevated cholesterol  Hypertension  Pancreatitis Past Surgical History:  
Procedure Laterality Date  HX GI    
 HX HEMORRHOIDECTOMY 2013 Social History Tobacco Use  Smoking status: Current Every Day Smoker Packs/day: 0.25  Smokeless tobacco: Current User Substance Use Topics  Alcohol use: Yes Comment: one beer per day Family History Problem Relation Age of Onset  Hypertension Mother  Breast Cancer Mother  Hypertension Father  Heart Attack Father  No Known Problems Sister Allergies Allergen Reactions  Peanut Rash Face swelling, scratchy throat, rash Prior to Admission medications Medication Sig Start Date End Date Taking? Authorizing Provider  
cetirizine (ZYRTEC) 10 mg tablet Take 10 mg by mouth daily. Yes Other, MD Kim  
pantoprazole (PROTONIX) 40 mg tablet TAKE 1 TABLET BY MOUTH DAILY FOR STOMACH ACID 4/1/19   Bao Pulido MD  
ondansetron hcl (ZOFRAN) 4 mg tablet TAKE 1 TABLET BY MOUTH EVERY EIGHT (8) HOURS AS NEEDED FOR NAUSEA. 4/1/19   Bao Pulido MD  
 
 
REVIEW OF SYSTEMS:    
I am not able to complete the review of systems because: The patient is intubated and sedated The patient has altered mental status due to his acute medical problems The patient has baseline aphasia from prior stroke(s) The patient has baseline dementia and is not reliable historian The patient is in acute medical distress and unable to provide information Total of 12 systems reviewed as follows:   
   POSITIVE= underlined text  Negative = text not underlined General:  fever, chills, sweats, generalized weakness, weight loss/gain,  
   loss of appetite Eyes:    blurred vision, eye pain, loss of vision, double vision ENT:    rhinorrhea, pharyngitis Respiratory:   cough, sputum production, SOB, ALEX, wheezing, pleuritic pain  
Cardiology:   chest pain, palpitations, orthopnea, PND, edema, syncope Gastrointestinal:  abdominal pain , N/V, diarrhea, dysphagia, constipation, bleeding Genitourinary:  frequency, urgency, dysuria, hematuria, incontinence Muskuloskeletal :  arthralgia, myalgia, back pain Hematology:  easy bruising, nose or gum bleeding, lymphadenopathy Dermatological: rash, ulceration, pruritis, color change / jaundice Endocrine:   hot flashes or polydipsia Neurological:  headache, dizziness, confusion, focal weakness, paresthesia, Speech difficulties, memory loss, gait difficulty Psychological: Feelings of anxiety, depression, agitation Objective: VITALS:   
Visit Vitals /77 (BP 1 Location: Left arm, BP Patient Position: At rest) Pulse 82 Temp 98.6 °F (37 °C) Resp 16 Wt 70.8 kg (156 lb 1.4 oz) SpO2 100% BMI 22.40 kg/m² PHYSICAL EXAM: 
 
General:    Alert, cooperative, no distress, appears stated age. HEENT: Atraumatic, anicteric sclerae, pale conjunctivae No oral ulcers, mucosa moist, throat clear, dentition fair Neck:  Supple, symmetrical,  thyroid: non tender Lungs:   Clear to auscultation bilaterally. No Wheezing or Rhonchi. No rales. Chest wall:  No tenderness  No Accessory muscle use. Heart:   Regular  rhythm,  No  murmur   No edema Abdomen:   Soft, non-tender. Not distended. Bowel sounds normal 
Extremities: No cyanosis. No clubbing,   
  Skin turgor normal, Capillary refill normal, Radial dial pulse 2+ Skin:     Not pale. Not Jaundiced  No rashes Psych:  Not anxious or agitated. Neurologic: EOMs intact. No facial asymmetry. No aphasia or slurred speech. Symmetrical strength, Sensation grossly intact. Alert and oriented X 4.  
 
_______________________________________________________________________ Care Plan discussed with: 
  Comments Patient y Family  y Wife at bed side RN y   
Care Manager Consultant:     
_______________________________________________________________________ Expected  Disposition:  
Home with Family y HH/PT/OT/RN   
SNF/LTC   
[facility]   
________________________________________________________________________ TOTAL TIME:  60  Minutes Critical Care Provided     Minutes non procedure based Comments  
 y Reviewed previous records  
>50% of visit spent in counseling and coordination of care y Discussion with patient and/or family and questions answered 
  
 
________________________________________________________________________ Signed: Leonardo Patton MD 
 
Procedures: see electronic medical records for all procedures/Xrays and details which were not copied into this note but were reviewed prior to creation of Plan.  
 
LAB DATA REVIEWED:   
 Recent Results (from the past 24 hour(s)) CBC WITH AUTOMATED DIFF Collection Time: 04/03/19 10:18 AM  
Result Value Ref Range WBC 4.0 (L) 4.1 - 11.1 K/uL  
 RBC 3.17 (L) 4.10 - 5.70 M/uL HGB 6.0 (L) 12.1 - 17.0 g/dL HCT 20.2 (L) 36.6 - 50.3 % MCV 63.7 (L) 80.0 - 99.0 FL  
 MCH 18.9 (L) 26.0 - 34.0 PG  
 MCHC 29.7 (L) 30.0 - 36.5 g/dL RDW 30.8 (H) 11.5 - 14.5 % PLATELET 158 254 - 914 K/uL MPV ABNORMAL 8.9 - 12.9 FL  
 NRBC 2.0 (H) 0  WBC ABSOLUTE NRBC 0.08 (H) 0.00 - 0.01 K/uL NEUTROPHILS 71 32 - 75 % LYMPHOCYTES 16 12 - 49 % MONOCYTES 10 5 - 13 % EOSINOPHILS 1 0 - 7 % BASOPHILS 0 0 - 1 % IMMATURE GRANULOCYTES 2 (H) 0.0 - 0.5 % ABS. NEUTROPHILS 2.9 1.8 - 8.0 K/UL  
 ABS. LYMPHOCYTES 0.6 (L) 0.8 - 3.5 K/UL  
 ABS. MONOCYTES 0.4 0.0 - 1.0 K/UL  
 ABS. EOSINOPHILS 0.0 0.0 - 0.4 K/UL  
 ABS. BASOPHILS 0.0 0.0 - 0.1 K/UL  
 ABS. IMM. GRANS. 0.1 (H) 0.00 - 0.04 K/UL  
 DF AUTOMATED    
 RBC COMMENTS MICROCYTOSIS 2+ 
    
 RBC COMMENTS HYPOCHROMIA 2+ 
    
 RBC COMMENTS POLYCHROMASIA 1+ 
    
 RBC COMMENTS TEARDROP CELLS 
PRESENT 
    
 RBC COMMENTS POIKILOCYTOSIS 
PRESENT 
    
 RBC COMMENTS TARGET CELLS 
PRESENT 
    
METABOLIC PANEL, COMPREHENSIVE Collection Time: 04/03/19 10:18 AM  
Result Value Ref Range Sodium 134 (L) 136 - 145 mmol/L Potassium 3.5 3.5 - 5.1 mmol/L Chloride 98 97 - 108 mmol/L  
 CO2 27 21 - 32 mmol/L Anion gap 9 5 - 15 mmol/L Glucose 93 65 - 100 mg/dL BUN 6 6 - 20 MG/DL Creatinine 0.78 0.70 - 1.30 MG/DL  
 BUN/Creatinine ratio 8 (L) 12 - 20 GFR est AA >60 >60 ml/min/1.73m2 GFR est non-AA >60 >60 ml/min/1.73m2 Calcium 9.1 8.5 - 10.1 MG/DL Bilirubin, total 0.4 0.2 - 1.0 MG/DL  
 ALT (SGPT) 75 12 - 78 U/L  
 AST (SGOT) 145 (H) 15 - 37 U/L Alk. phosphatase 95 45 - 117 U/L Protein, total 8.0 6.4 - 8.2 g/dL Albumin 4.1 3.5 - 5.0 g/dL Globulin 3.9 2.0 - 4.0 g/dL A-G Ratio 1.1 1.1 - 2.2 TYPE + CROSSMATCH Collection Time: 04/03/19 11:30 AM  
Result Value Ref Range Crossmatch Expiration 04/06/2019 ABO/Rh(D) O POSITIVE Antibody screen NEG Unit number [id number] Blood component type RC LR,2 Unit division 00 Status of unit ISSUED Crossmatch result Compatible Unit number [id number] Blood component type RC LR,2 Unit division 00 Status of unit ALLOCATED Crossmatch result Compatible OCCULT BLOOD, STOOL Collection Time: 04/03/19 12:01 PM  
Result Value Ref Range Occult blood, stool NEGATIVE  NEG PROTHROMBIN TIME + INR Collection Time: 04/03/19  1:05 PM  
Result Value Ref Range INR 3.0 (H) 0.9 - 1.1 Prothrombin time 29.3 (H) 9.0 - 11.1 sec URINALYSIS W/ REFLEX CULTURE Collection Time: 04/03/19  1:55 PM  
Result Value Ref Range Color YELLOW/STRAW Appearance CLEAR CLEAR Specific gravity 1.009 1.003 - 1.030    
 pH (UA) 6.0 5.0 - 8.0 Protein NEGATIVE  NEG mg/dL Glucose NEGATIVE  NEG mg/dL Ketone NEGATIVE  NEG mg/dL Bilirubin NEGATIVE  NEG Blood NEGATIVE  NEG Urobilinogen 0.2 0.2 - 1.0 EU/dL Nitrites NEGATIVE  NEG Leukocyte Esterase NEGATIVE  NEG    
 WBC 0-4 0 - 4 /hpf  
 RBC 0-5 0 - 5 /hpf Epithelial cells FEW FEW /lpf Bacteria NEGATIVE  NEG /hpf  
 UA:UC IF INDICATED CULTURE NOT INDICATED BY UA RESULT CNI Hyaline cast 0-2 0 - 5 /lpf

## 2019-04-03 NOTE — CONSULTS
[address]               Gastroenterology Consult Note  Major DELFINO Mckeon covering for Dr. Lance Allen     Requesting physician: Dr. Hao Ariza Date: 4/3/2019     Subjective:     Chief Complaint: Low blood count     History of Present Illness: Melody Floyd is a 41 y.o. male who is seen in consultation for possible GI bleed. Pt has seen Dr. Sumaya Gaytan in the past for hx of pancreatitis believed to be secondary to hypertriglyceridemia as well as alcohol abuse. Pt reports he saw PCP on Friday for multiple complaints including BRB in his stool as well as lower abdominal pain and decrease in appetite. Pt and family in room, pt is poor historian. PCP called pt and told blood count was low and o go to ED for transfusion. Also instructed to hold Coumadin for a week. He reports he has been seeing blood in his stool since last f/u in our office back in 2018. He reports blood is always BRB in color, wife describes it as coming and going with episodes once monthly during \"full moon\". Blood can fill toilet at times and pt admits to bleeding that persists without BM. Last episode of hematochezia was on Friday but not as severe. He denies any dark tarry stools currently. He was having darker stools with iron replacement therapy that he stopped over 6 months ago because he couldn't tolerate. He reports lower abdominal pain on Friday as well as constipation, when he had a BM abdominal pain was relieved, nothing further. Admits to nausea with increased acid reflux with all meals, he will take 2 bites and then start having acid reflux and have to stop eating. He is on protonix and started supplementing with ranitidine as well as TUMs. No dysphagia. He admits to some dizziness and lightheadedness, no syncope but felt as though he was going to. Admits to SOB. He has been taking Aleve twice daily for joint pains over the last few weeks.   No other NSAID use, he is on coumadin therapy, placed on Coumadin in 2017 due to dural sinus thrombosis according to wife. Per ED note he was supposed to only be on it for 1 year but patient continued to take the medicine, he had no Coumadin checks or follow up with his PCP for whole year and a half. Seen Pt in ED, labs done showing anemia with hgb at 6, repeat INR at 3, was 6 on 4/1. Stool was checked and is heme negative. Has one unit of blood being transfused. Pt reports colonoscopy done in '15 as well as '16 or '17, when asked if he had any polyps he report \"I definitely had some of them, I have so many issues with going to the bathroom\" unsure of other findings, thinks it was done at [facility]. He thinks EGD was done with most recent EGD, unsure of findings. No family hx of colon, stomach, or esophageal cancer. He smokes 5-10 cigarettes a day, drinks a 6 pack of beer nightly, no hard liquor, admits to daily Marijuana use, no other illicit substance use.      Past Medical History:   Diagnosis Date    Bleeding hemorrhoid     Elevated cholesterol     Hypertension     Pancreatitis      Past Surgical History:   Procedure Laterality Date    HX GI      HX HEMORRHOIDECTOMY      2013      Family History   Problem Relation Age of Onset    Hypertension Mother     Breast Cancer Mother     Hypertension Father     Heart Attack Father     No Known Problems Sister      Social History     Tobacco Use    Smoking status: Current Every Day Smoker     Packs/day: 0.25    Smokeless tobacco: Current User   Substance Use Topics    Alcohol use: Yes     Comment: one beer per day      Allergies   Allergen Reactions    Peanut Rash     Face swelling, scratchy throat, rash     Current Facility-Administered Medications   Medication Dose Route Frequency    0.9% sodium chloride infusion 250 mL  250 mL IntraVENous PRN    pantoprazole (PROTONIX) injection 40 mg  40 mg IntraVENous NOW    iopamidol (ISOVUE-370) 76 % injection 100 mL  100 mL IntraVENous RAD ONCE    sodium chloride (NS) flush 10 mL  10 mL IntraVENous RAD ONCE     Current Outpatient Medications   Medication Sig    cetirizine (ZYRTEC) 10 mg tablet Take 10 mg by mouth daily.  pantoprazole (PROTONIX) 40 mg tablet TAKE 1 TABLET BY MOUTH DAILY FOR STOMACH ACID    ondansetron hcl (ZOFRAN) 4 mg tablet TAKE 1 TABLET BY MOUTH EVERY EIGHT (8) HOURS AS NEEDED FOR NAUSEA. Review of Systems:    A detailed review of systems was performed as follows:  Constitutional:  +chills, +lighheadedness  Eyes:  No ocular sensitivity to the sun, blurred vision or double vision. ENMT:  +nasal conjestion, + sinus problems. Respiratory: + coughing, +sob  Cardiac: +chest pain, nopalpitations  Gastrointestinal:  See history of the present illness  :   No pain with urination or hematuria  Musculoskeletal:  +arthritis no hot swollen joints. Endocrine:  No thyroid disease or diabetes  Psychiatric: No depression or feeling blue  Integumentary:  No skin rash or sensitivity to the sun. Neurologic:  No stroke or seizure; no numbness or tingling of the extremities. Heme-Lymphatic:  +history of anemia, no unexplained lumps or bumps      Objective:     Physical Exam:  Visit Vitals  /77 (BP 1 Location: Left arm, BP Patient Position: At rest)   Pulse 82   Temp 98.8 °F (37.1 °C)   Resp 14   Wt 70.8 kg (156 lb 1.4 oz)   SpO2 100%   BMI 22.40 kg/m²        GEN: BM, NAD  Skin:  Extremities and face reveal no rashes. No gilmore erythema. No telangiectasias on the chest wall. HEENT: Sclerae anicteric. Extra-occular muscles are intact. Pale conjuctiva  Cardiovascular: Regular rate and rhythm. No murmurs, gallops, or rubs. Respiratory:  Comfortable breathing with no accessory muscle use. Clear breath sounds with no wheezes, rales, or rhonchi. GI:  Abdomen nondistended, soft, and nontender. Normal active bowel sounds. No enlargement of the liver or spleen. No masses palpable.   Rectal:  ED rectal exam: no external hemorrhoid, no internal hemorrhoid, no fissure, no mass, no tenderness, anal tone normal and guaiac negative stool  Musculoskeletal:  No pitting edema of the lower legs. Neurological:  Gross memory appears intact. Patient is alert and oriented. Psychiatric:  Mood appears appropriate poor judgement and insight  Lymphatic:  No cervical or supraclavicular adenopathy. Laboratory:    Recent Results (from the past 24 hour(s))   CBC WITH AUTOMATED DIFF    Collection Time: 04/03/19 10:18 AM   Result Value Ref Range    WBC 4.0 (L) 4.1 - 11.1 K/uL    RBC 3.17 (L) 4.10 - 5.70 M/uL    HGB 6.0 (L) 12.1 - 17.0 g/dL    HCT 20.2 (L) 36.6 - 50.3 %    MCV 63.7 (L) 80.0 - 99.0 FL    MCH 18.9 (L) 26.0 - 34.0 PG    MCHC 29.7 (L) 30.0 - 36.5 g/dL    RDW 30.8 (H) 11.5 - 14.5 %    PLATELET 741 069 - 566 K/uL    MPV ABNORMAL 8.9 - 12.9 FL    NRBC 2.0 (H) 0  WBC    ABSOLUTE NRBC 0.08 (H) 0.00 - 0.01 K/uL    NEUTROPHILS 71 32 - 75 %    LYMPHOCYTES 16 12 - 49 %    MONOCYTES 10 5 - 13 %    EOSINOPHILS 1 0 - 7 %    BASOPHILS 0 0 - 1 %    IMMATURE GRANULOCYTES 2 (H) 0.0 - 0.5 %    ABS. NEUTROPHILS 2.9 1.8 - 8.0 K/UL    ABS. LYMPHOCYTES 0.6 (L) 0.8 - 3.5 K/UL    ABS. MONOCYTES 0.4 0.0 - 1.0 K/UL    ABS. EOSINOPHILS 0.0 0.0 - 0.4 K/UL    ABS. BASOPHILS 0.0 0.0 - 0.1 K/UL    ABS. IMM.  GRANS. 0.1 (H) 0.00 - 0.04 K/UL    DF AUTOMATED      RBC COMMENTS MICROCYTOSIS  2+        RBC COMMENTS HYPOCHROMIA  2+        RBC COMMENTS POLYCHROMASIA  1+        RBC COMMENTS TEARDROP CELLS  PRESENT        RBC COMMENTS POIKILOCYTOSIS  PRESENT        RBC COMMENTS TARGET CELLS  PRESENT       METABOLIC PANEL, COMPREHENSIVE    Collection Time: 04/03/19 10:18 AM   Result Value Ref Range    Sodium 134 (L) 136 - 145 mmol/L    Potassium 3.5 3.5 - 5.1 mmol/L    Chloride 98 97 - 108 mmol/L    CO2 27 21 - 32 mmol/L    Anion gap 9 5 - 15 mmol/L    Glucose 93 65 - 100 mg/dL    BUN 6 6 - 20 MG/DL    Creatinine 0.78 0.70 - 1.30 MG/DL    BUN/Creatinine ratio 8 (L) 12 - 20      GFR est AA >60 >60 ml/min/1.73m2    GFR est non-AA >60 >60 ml/min/1.73m2    Calcium 9.1 8.5 - 10.1 MG/DL    Bilirubin, total 0.4 0.2 - 1.0 MG/DL    ALT (SGPT) 75 12 - 78 U/L    AST (SGOT) 145 (H) 15 - 37 U/L    Alk.  phosphatase 95 45 - 117 U/L    Protein, total 8.0 6.4 - 8.2 g/dL    Albumin 4.1 3.5 - 5.0 g/dL    Globulin 3.9 2.0 - 4.0 g/dL    A-G Ratio 1.1 1.1 - 2.2     TYPE + CROSSMATCH    Collection Time: 04/03/19 11:30 AM   Result Value Ref Range    Crossmatch Expiration 04/06/2019     ABO/Rh(D) O POSITIVE     Antibody screen NEG     Unit number [id number]     Blood component type  LR,2     Unit division 00     Status of unit ISSUED     Crossmatch result Compatible     Unit number [id number]     Blood component type  LR,2     Unit division 00     Status of unit ALLOCATED     Crossmatch result Compatible    OCCULT BLOOD, STOOL    Collection Time: 04/03/19 12:01 PM   Result Value Ref Range    Occult blood, stool NEGATIVE  NEG     PROTHROMBIN TIME + INR    Collection Time: 04/03/19  1:05 PM   Result Value Ref Range    INR 3.0 (H) 0.9 - 1.1      Prothrombin time 29.3 (H) 9.0 - 11.1 sec   URINALYSIS W/ REFLEX CULTURE    Collection Time: 04/03/19  1:55 PM   Result Value Ref Range    Color YELLOW/STRAW      Appearance CLEAR CLEAR      Specific gravity 1.009 1.003 - 1.030      pH (UA) 6.0 5.0 - 8.0      Protein NEGATIVE  NEG mg/dL    Glucose NEGATIVE  NEG mg/dL    Ketone NEGATIVE  NEG mg/dL    Bilirubin NEGATIVE  NEG      Blood NEGATIVE  NEG      Urobilinogen 0.2 0.2 - 1.0 EU/dL    Nitrites NEGATIVE  NEG      Leukocyte Esterase NEGATIVE  NEG      WBC 0-4 0 - 4 /hpf    RBC 0-5 0 - 5 /hpf    Epithelial cells FEW FEW /lpf    Bacteria NEGATIVE  NEG /hpf    UA:UC IF INDICATED CULTURE NOT INDICATED BY UA RESULT CNI      Hyaline cast 0-2 0 - 5 /lpf     Lab Results   Component Value Date/Time    INR 3.0 (H) 04/03/2019 01:05 PM    INR 1.0 07/14/2017 03:22 AM    INR 0.9 07/11/2017 10:03 AM    INR POC 6.0 04/01/2019 04:58 PM    INR POC 3.0 12/20/2017 11:16 AM    INR POC 1.8 10/20/2017 09:07 AM    Prothrombin time 29.3 (H) 04/03/2019 01:05 PM    Prothrombin time 9.6 07/14/2017 03:22 AM    Prothrombin time 9.2 07/11/2017 10:03 AM       Assessment/Plan:     Active Problems:    Acute anemia (4/3/2019)      Hematochezia (4/3/2019)      GERD (gastroesophageal reflux disease) (4/3/2019)      Current use of long term anticoagulation (4/3/2019)      NSAID long-term use (4/3/2019)    Overall pt is presenting with multiple GI complaints, given his hg found to be low at 6 will start evaluation for anemia with EGD to evaluate for PUD secondary to NSAID use, esophagitis, gastritis, and H. Pylori infection. He has eaten a full meal today and INR is currently 3. Will hold off on EGD today but will repeat INR tomorrow with hopes it decreaases <2.5, and schedule for diagnostic EGD tomorrow. He will also need a colonoscopy tomorrow given severe hematochezia, unsure if this is coming from a bleeding diverticulum or AVM or other more worrisome source of bleeding. Continue to monitor hgb, agree with protonix.   Will order split dose prep and NPO at 8 AM.      PAULINA Alford    04/03/19  3:51 PM    [address] [facility], [address] [place] [address]  [phone]  [address] [place] [address] [place]: [phone]

## 2019-04-03 NOTE — PROGRESS NOTES
TRANSFER - IN REPORT: 
 
Verbal report received from leonora(name) on Coralee Ped  being received from ed(unit) for routine progression of care Report consisted of patients Situation, Background, Assessment and  
Recommendations(SBAR). Information from the following report(s) SBAR, Kardex, Recent Results and Med Rec Status was reviewed with the receiving nurse. Opportunity for questions and clarification was provided. Assessment completed upon patients arrival to unit and care assumed.

## 2019-04-03 NOTE — PROGRESS NOTES
* No post-op diagnosis entered * 
* No post-op diagnosis entered * Bedside and Verbal shift change report given to jurgen (oncoming nurse) by carmine (offgoing nurse). Report included the following information SBAR and Kardex. Zone Phone:   [phone] Significant changes during shift:  New admit  Received one unit of blood Patient Information Nirmal Torres 41 y.o. 
4/3/2019 11:35 AM by Gareth Mata MD. Nirmal Torres was admitted from Home 
 
Problem List 
 
Patient Active Problem List  
 Diagnosis Date Noted  Acute anemia 04/03/2019  Hematochezia 04/03/2019  GERD (gastroesophageal reflux disease) 04/03/2019  Current use of long term anticoagulation 04/03/2019  NSAID long-term use 04/03/2019  Iron deficiency anemia 07/14/2017  Hyperlipidemia 07/14/2017  Dural venous sinus thrombosis 07/12/2017  Acute pancreatitis 12/27/2014  Abnormal LFTs 12/27/2014  Hyponatremia 12/27/2014  Accelerated hypertension 12/27/2014  Alcohol dependence ([address] 12/27/2014 Past Medical History:  
Diagnosis Date  Bleeding hemorrhoid  Elevated cholesterol  Hypertension  Pancreatitis Core Measures: CVA: No No 
CHF:No No 
PNA:No No 
 
} Activity Status: OOB to Chair Yes Ambulated this shift Yes Bed Rest No 
 
Supplemental O2: (If Applicable) NC No 
NRB No 
Venti-mask No 
On  Liters/min LINES AND DRAINS: 
Central Line? No Placement date  Reason Medically Necessary PICC LINE? No Placement date Reason Medically Necessary Urinary Catheter? No Placement Date  Reason Medically Necessary DVT prophylaxis: DVT prophylaxis Med- No 
DVT prophylaxis SCD or ARTIE- Yes Wounds: (If Applicable) Wounds- No 
 
Location Patient Safety: 
 
Falls Score Total Score: 1 Safety Level_______ Bed Alarm On? No 
Sitter? No 
 
Plan for upcoming shift:  Check hgb Discharge Plan: No  
 
Active Consults: 
IP CONSULT TO HOSPITALIST IP CONSULT TO GASTROENTEROLOGY

## 2019-04-04 ENCOUNTER — ANESTHESIA (OUTPATIENT)
Dept: ENDOSCOPY | Age: 39
DRG: 378 | End: 2019-04-04
Payer: COMMERCIAL

## 2019-04-04 ENCOUNTER — ANESTHESIA EVENT (OUTPATIENT)
Dept: ENDOSCOPY | Age: 39
DRG: 378 | End: 2019-04-04
Payer: COMMERCIAL

## 2019-04-04 LAB
ALBUMIN SERPL-MCNC: 4.2 G/DL (ref 3.5–5)
ALBUMIN/GLOB SERPL: 1.1 {RATIO} (ref 1.1–2.2)
ALP SERPL-CCNC: 70 U/L (ref 45–117)
ALT SERPL-CCNC: 71 U/L (ref 12–78)
ANION GAP SERPL CALC-SCNC: 6 MMOL/L (ref 5–15)
AST SERPL-CCNC: 119 U/L (ref 15–37)
BASOPHILS # BLD: 0 K/UL (ref 0–0.1)
BASOPHILS NFR BLD: 0 % (ref 0–1)
BILIRUB SERPL-MCNC: 0.9 MG/DL (ref 0.2–1)
BUN SERPL-MCNC: 5 MG/DL (ref 6–20)
BUN/CREAT SERPL: 6 (ref 12–20)
CALCIUM SERPL-MCNC: 9.1 MG/DL (ref 8.5–10.1)
CHLORIDE SERPL-SCNC: 103 MMOL/L (ref 97–108)
CO2 SERPL-SCNC: 27 MMOL/L (ref 21–32)
CREAT SERPL-MCNC: 0.83 MG/DL (ref 0.7–1.3)
DIFFERENTIAL METHOD BLD: ABNORMAL
EOSINOPHIL # BLD: 0.1 K/UL (ref 0–0.4)
EOSINOPHIL NFR BLD: 1 % (ref 0–7)
GLOBULIN SER CALC-MCNC: 3.8 G/DL (ref 2–4)
GLUCOSE SERPL-MCNC: 99 MG/DL (ref 65–100)
HCT VFR BLD AUTO: 29.1 % (ref 36.6–50.3)
HGB BLD-MCNC: 6.2 G/DL (ref 12.1–17)
HGB BLD-MCNC: 9.1 G/DL (ref 12.1–17)
HGB BLD-MCNC: 9.3 G/DL (ref 12.1–17)
IMM GRANULOCYTES # BLD AUTO: 0.1 K/UL (ref 0–0.04)
IMM GRANULOCYTES NFR BLD AUTO: 1 % (ref 0–0.5)
INR PPP: 1.4 (ref 0.9–1.1)
LYMPHOCYTES # BLD: 0.9 K/UL (ref 0.8–3.5)
LYMPHOCYTES NFR BLD: 18 % (ref 12–49)
MCH RBC QN AUTO: 22.6 PG (ref 26–34)
MCHC RBC AUTO-ENTMCNC: 32 G/DL (ref 30–36.5)
MCV RBC AUTO: 70.6 FL (ref 80–99)
MONOCYTES # BLD: 0.5 K/UL (ref 0–1)
MONOCYTES NFR BLD: 10 % (ref 5–13)
NEUTS SEG # BLD: 3.3 K/UL (ref 1.8–8)
NEUTS SEG NFR BLD: 70 % (ref 32–75)
NRBC # BLD: 0.17 K/UL (ref 0–0.01)
NRBC BLD-RTO: 3.4 PER 100 WBC
PLATELET # BLD AUTO: 320 K/UL (ref 150–400)
POTASSIUM SERPL-SCNC: 3.9 MMOL/L (ref 3.5–5.1)
PROT SERPL-MCNC: 8 G/DL (ref 6.4–8.2)
PROTHROMBIN TIME: 14.2 SEC (ref 9–11.1)
RBC # BLD AUTO: 4.12 M/UL (ref 4.1–5.7)
RBC MORPH BLD: ABNORMAL
SODIUM SERPL-SCNC: 136 MMOL/L (ref 136–145)
WBC # BLD AUTO: 4.9 K/UL (ref 4.1–11.1)

## 2019-04-04 PROCEDURE — 74011250636 HC RX REV CODE- 250/636

## 2019-04-04 PROCEDURE — 0DJD8ZZ INSPECTION OF LOWER INTESTINAL TRACT, VIA NATURAL OR ARTIFICIAL OPENING ENDOSCOPIC: ICD-10-PCS | Performed by: SPECIALIST

## 2019-04-04 PROCEDURE — 36415 COLL VENOUS BLD VENIPUNCTURE: CPT

## 2019-04-04 PROCEDURE — P9016 RBC LEUKOCYTES REDUCED: HCPCS

## 2019-04-04 PROCEDURE — 85025 COMPLETE CBC W/AUTO DIFF WBC: CPT

## 2019-04-04 PROCEDURE — 74011250637 HC RX REV CODE- 250/637: Performed by: HOSPITALIST

## 2019-04-04 PROCEDURE — C9113 INJ PANTOPRAZOLE SODIUM, VIA: HCPCS | Performed by: INTERNAL MEDICINE

## 2019-04-04 PROCEDURE — 36430 TRANSFUSION BLD/BLD COMPNT: CPT

## 2019-04-04 PROCEDURE — 85018 HEMOGLOBIN: CPT

## 2019-04-04 PROCEDURE — 0DJ08ZZ INSPECTION OF UPPER INTESTINAL TRACT, VIA NATURAL OR ARTIFICIAL OPENING ENDOSCOPIC: ICD-10-PCS | Performed by: SPECIALIST

## 2019-04-04 PROCEDURE — 65660000000 HC RM CCU STEPDOWN

## 2019-04-04 PROCEDURE — 77030019988 HC FCPS ENDOSC DISP BSC -B: Performed by: SPECIALIST

## 2019-04-04 PROCEDURE — 76040000007: Performed by: SPECIALIST

## 2019-04-04 PROCEDURE — 80053 COMPREHEN METABOLIC PANEL: CPT

## 2019-04-04 PROCEDURE — 74011250637 HC RX REV CODE- 250/637: Performed by: INTERNAL MEDICINE

## 2019-04-04 PROCEDURE — 74011000250 HC RX REV CODE- 250: Performed by: INTERNAL MEDICINE

## 2019-04-04 PROCEDURE — 85610 PROTHROMBIN TIME: CPT

## 2019-04-04 PROCEDURE — 94760 N-INVAS EAR/PLS OXIMETRY 1: CPT

## 2019-04-04 PROCEDURE — 74011250636 HC RX REV CODE- 250/636: Performed by: INTERNAL MEDICINE

## 2019-04-04 PROCEDURE — 76060000032 HC ANESTHESIA 0.5 TO 1 HR: Performed by: SPECIALIST

## 2019-04-04 RX ORDER — LIDOCAINE HYDROCHLORIDE 20 MG/ML
INJECTION, SOLUTION EPIDURAL; INFILTRATION; INTRACAUDAL; PERINEURAL AS NEEDED
Status: DISCONTINUED | OUTPATIENT
Start: 2019-04-04 | End: 2019-04-04 | Stop reason: HOSPADM

## 2019-04-04 RX ORDER — SODIUM CHLORIDE 9 MG/ML
INJECTION, SOLUTION INTRAVENOUS
Status: DISCONTINUED | OUTPATIENT
Start: 2019-04-04 | End: 2019-04-04 | Stop reason: HOSPADM

## 2019-04-04 RX ORDER — FLUMAZENIL 0.1 MG/ML
0.2 INJECTION INTRAVENOUS
Status: DISCONTINUED | OUTPATIENT
Start: 2019-04-04 | End: 2019-04-04 | Stop reason: HOSPADM

## 2019-04-04 RX ORDER — ALPRAZOLAM 0.5 MG/1
0.5 TABLET ORAL ONCE
Status: COMPLETED | OUTPATIENT
Start: 2019-04-04 | End: 2019-04-04

## 2019-04-04 RX ORDER — SODIUM CHLORIDE 0.9 % (FLUSH) 0.9 %
5-40 SYRINGE (ML) INJECTION EVERY 8 HOURS
Status: DISCONTINUED | OUTPATIENT
Start: 2019-04-04 | End: 2019-04-05 | Stop reason: HOSPADM

## 2019-04-04 RX ORDER — SODIUM CHLORIDE 0.9 % (FLUSH) 0.9 %
5-40 SYRINGE (ML) INJECTION AS NEEDED
Status: DISCONTINUED | OUTPATIENT
Start: 2019-04-04 | End: 2019-04-05 | Stop reason: HOSPADM

## 2019-04-04 RX ORDER — PROPOFOL 10 MG/ML
INJECTION, EMULSION INTRAVENOUS AS NEEDED
Status: DISCONTINUED | OUTPATIENT
Start: 2019-04-04 | End: 2019-04-04 | Stop reason: HOSPADM

## 2019-04-04 RX ORDER — NALOXONE HYDROCHLORIDE 0.4 MG/ML
0.4 INJECTION, SOLUTION INTRAMUSCULAR; INTRAVENOUS; SUBCUTANEOUS
Status: DISCONTINUED | OUTPATIENT
Start: 2019-04-04 | End: 2019-04-04 | Stop reason: HOSPADM

## 2019-04-04 RX ORDER — SODIUM CHLORIDE 9 MG/ML
250 INJECTION, SOLUTION INTRAVENOUS AS NEEDED
Status: DISCONTINUED | OUTPATIENT
Start: 2019-04-04 | End: 2019-04-05 | Stop reason: HOSPADM

## 2019-04-04 RX ORDER — EPINEPHRINE 0.1 MG/ML
1 INJECTION INTRACARDIAC; INTRAVENOUS
Status: DISCONTINUED | OUTPATIENT
Start: 2019-04-04 | End: 2019-04-04 | Stop reason: HOSPADM

## 2019-04-04 RX ORDER — ATROPINE SULFATE 0.1 MG/ML
0.5 INJECTION INTRAVENOUS
Status: DISCONTINUED | OUTPATIENT
Start: 2019-04-04 | End: 2019-04-04 | Stop reason: HOSPADM

## 2019-04-04 RX ORDER — DEXTROMETHORPHAN/PSEUDOEPHED 2.5-7.5/.8
1.2 DROPS ORAL
Status: DISCONTINUED | OUTPATIENT
Start: 2019-04-04 | End: 2019-04-04 | Stop reason: HOSPADM

## 2019-04-04 RX ADMIN — SODIUM CHLORIDE: 9 INJECTION, SOLUTION INTRAVENOUS at 12:12

## 2019-04-04 RX ADMIN — FOLIC ACID: 5 INJECTION, SOLUTION INTRAMUSCULAR; INTRAVENOUS; SUBCUTANEOUS at 10:44

## 2019-04-04 RX ADMIN — Medication 5 ML: at 06:15

## 2019-04-04 RX ADMIN — PANTOPRAZOLE SODIUM 40 MG: 40 INJECTION, POWDER, FOR SOLUTION INTRAVENOUS at 17:20

## 2019-04-04 RX ADMIN — PROPOFOL 100 MG: 10 INJECTION, EMULSION INTRAVENOUS at 12:39

## 2019-04-04 RX ADMIN — Medication 20 ML: at 22:55

## 2019-04-04 RX ADMIN — PROPOFOL 100 MG: 10 INJECTION, EMULSION INTRAVENOUS at 12:22

## 2019-04-04 RX ADMIN — ACETAMINOPHEN 650 MG: 325 TABLET ORAL at 07:51

## 2019-04-04 RX ADMIN — PROPOFOL 50 MG: 10 INJECTION, EMULSION INTRAVENOUS at 12:26

## 2019-04-04 RX ADMIN — PROPOFOL 50 MG: 10 INJECTION, EMULSION INTRAVENOUS at 12:30

## 2019-04-04 RX ADMIN — ALPRAZOLAM 0.5 MG: 0.5 TABLET ORAL at 22:55

## 2019-04-04 RX ADMIN — LIDOCAINE HYDROCHLORIDE 100 MG: 20 INJECTION, SOLUTION EPIDURAL; INFILTRATION; INTRACAUDAL; PERINEURAL at 12:22

## 2019-04-04 RX ADMIN — CETIRIZINE HYDROCHLORIDE 10 MG: 10 TABLET, FILM COATED ORAL at 07:51

## 2019-04-04 RX ADMIN — PROPOFOL 100 MG: 10 INJECTION, EMULSION INTRAVENOUS at 12:33

## 2019-04-04 RX ADMIN — SODIUM CHLORIDE 75 ML/HR: 900 INJECTION, SOLUTION INTRAVENOUS at 22:56

## 2019-04-04 RX ADMIN — PROPOFOL 50 MG: 10 INJECTION, EMULSION INTRAVENOUS at 12:23

## 2019-04-04 RX ADMIN — PANTOPRAZOLE SODIUM 40 MG: 40 INJECTION, POWDER, FOR SOLUTION INTRAVENOUS at 06:15

## 2019-04-04 NOTE — PROGRESS NOTES
Bedside shift change report given to Nancy RN (oncoming nurse) by Poonam Palacios RN (offgoing nurse). Report included the following information SBAR, Kardex, Intake/Output and Recent Results.

## 2019-04-04 NOTE — ANESTHESIA PREPROCEDURE EVALUATION
Relevant Problems No relevant active problems Anesthetic History No history of anesthetic complications Review of Systems / Medical History Patient summary reviewed, nursing notes reviewed and pertinent labs reviewed Pulmonary Smoker Neuro/Psych Within defined limits Cardiovascular Hypertension: well controlled Hyperlipidemia Exercise tolerance: >4 METS Comments: DVT  
GI/Hepatic/Renal 
  
GERD: well controlled Liver disease Endo/Other Within defined limits Anemia Other Findings Comments: Pancreatitis Hematochezia Physical Exam 
 
Airway Mallampati: II 
TM Distance: > 6 cm Neck ROM: normal range of motion Mouth opening: Normal 
 
 Cardiovascular Rhythm: regular Rate: normal 
 
 
 
 Dental 
 
Dentition: Caps/crowns, Upper dentition intact and Lower dentition intact Pulmonary Breath sounds clear to auscultation Abdominal 
GI exam deferred Other Findings Anesthetic Plan ASA: 2 Anesthesia type: general and total IV anesthesia Induction: Intravenous Anesthetic plan and risks discussed with: Patient

## 2019-04-04 NOTE — PROCEDURES
Colonoscopy Procedure Note    Indications:   Acute GI Hemorrhage    Referring Physician: Virginia Hall MD  Anesthesia/Sedation: MAC anesthesia Propofol  Endoscopist:  Dr. Janae Mendez    Procedure in Detail:  Informed consent was obtained for the procedure, including sedation. Risks of perforation, hemorrhage, adverse drug reaction, and aspiration were discussed. The patient was placed in the left lateral decubitus position. Based on the pre-procedure assessment, including review of the patient's medical history, medications, allergies, and review of systems, he had been deemed to be an appropriate candidate for moderate sedation; he was therefore sedated with the medications listed above. The patient was monitored continuously with ECG tracing, pulse oximetry, blood pressure monitoring, and direct observations. A rectal examination was performed. The [id number] was inserted into the rectum and advanced under direct vision to the terminal ileum. The quality of the colonic preparation was adequate. A careful inspection was made as the colonoscope was withdrawn, including a retroflexed view of the rectum; findings and interventions are described below. Appropriate photodocumentation was obtained. Findings:     1. Scope advanced to the cecum and terminal ileum. 2.  Preparation was good. 3.  Normal mucosa throughout colon and terminal ileum. No blood see in colon. 4.  There were friable internal hemorrhoids. 5.  No polyps seen. Therapies:  none    Specimen:  none     Complications: None were encountered during the procedure. EBL: < 10 ml.     Recommendations:   -Colonoscopy with internal hemorrhoids but will order SB Pill cam to r/o SB etiology  Signed By: David Modi MD                        April 4, 2019

## 2019-04-04 NOTE — PROGRESS NOTES
Spiritual Care Partner Volunteer visited patient in [address] on April 4, 2019. Documented by: 
MAYNOR Jacobs Div  Paging Service [phone])

## 2019-04-04 NOTE — PERIOP NOTES
TRANSFER - OUT REPORT: 
 
Verbal report given to Laina RN(name) on Juan David Montenegro  being transferred to [place] [address](unit) for routine progression of care Report consisted of patients Situation, Background, Assessment and  
Recommendations(SBAR). Information from the following report(s) SBAR was reviewed with the receiving nurse. Lines:  
Peripheral IV 04/04/19 Right;Posterior Forearm (Active) Site Assessment Clean, dry, & intact 4/4/2019  7:28 AM  
Phlebitis Assessment 0 4/4/2019  7:28 AM  
Infiltration Assessment 0 4/4/2019  7:28 AM  
Dressing Status Clean, dry, & intact 4/4/2019  7:28 AM  
Dressing Type Tape;Transparent 4/4/2019  7:28 AM  
Hub Color/Line Status Blue; Infusing 4/4/2019  7:28 AM  
  
 
Opportunity for questions and clarification was provided.

## 2019-04-04 NOTE — ANESTHESIA POSTPROCEDURE EVALUATION
Procedure(s): ESOPHAGOGASTRODUODENOSCOPY (EGD) COLONOSCOPY 
ESOPHAGOGASTRODUODENAL (EGD) BIOPSY. general, total IV anesthesia Anesthesia Post Evaluation Patient location during evaluation: PACU Note status: Adequate. Level of consciousness: responsive to verbal stimuli and sleepy but conscious Pain management: satisfactory to patient Airway patency: patent Anesthetic complications: no 
Cardiovascular status: acceptable Respiratory status: acceptable Hydration status: acceptable Comments: +Post-Anesthesia Evaluation and Assessment Patient: Derick Lang MRN: [id number]  SSN: [id number] YOB: 1980  Age: 41 y.o. Sex: male Cardiovascular Function/Vital Signs /78   Pulse 74   Temp 36.7 °C (98 °F)   Resp 17   Wt 70.8 kg (156 lb 1.4 oz)   SpO2 100%   BMI 22.40 kg/m² Patient is status post Procedure(s): ESOPHAGOGASTRODUODENOSCOPY (EGD) COLONOSCOPY 
ESOPHAGOGASTRODUODENAL (EGD) BIOPSY. Nausea/Vomiting: Controlled. Postoperative hydration reviewed and adequate. Pain: 
Pain Scale 1: Numeric (0 - 10) (04/04/19 1139) Pain Intensity 1: 0 (04/04/19 1139) Managed. Neurological Status: At baseline. Mental Status and Level of Consciousness: Arousable. Pulmonary Status:  
O2 Device: Room air (04/04/19 1250) Adequate oxygenation and airway patent. Complications related to anesthesia: None Post-anesthesia assessment completed. No concerns. Signed By: Mauro Dominique MD  
 4/4/2019 Post anesthesia nausea and vomiting:  controlled Vitals Value Taken Time  
BP 0/0 4/4/2019  1:00 PM  
Temp Pulse 139 4/4/2019  1:00 PM  
Resp 16 4/4/2019  1:00 PM  
SpO2 Vitals shown include unvalidated device data.

## 2019-04-04 NOTE — PROGRESS NOTES
Transporting  back to inpatient room [address] [phone] per Chary Hash per wheel chair. Pt in stable condition.

## 2019-04-04 NOTE — PROCEDURES
Esophagogastroduodenoscopy Procedure Note      Wojciech Bingham  1980  [id number]    Indication:  Acute GI bleeding on supratherapeutic warfarin    Endoscopist: Unique Davidson MD    Referring Provider:  Casey Burks MD    Sedation:  MAC anesthesia Propofol    Procedure Details:  After infomed consent was obtained for the procedure, with all risks and benefits of procedure explained the patient was taken to the endoscopy suite and placed in the left lateral decubitus position. Following sequential administration of sedation as per above, the endoscope was inserted into the mouth and advanced under direct vision to second portion of the duodenum. A careful inspection was made as the gastroscope was withdrawn, including a retroflexed view of the proximal stomach; findings and interventions are described below. Findings:     Esophagus:   - Normal mucosa throughout. Z line normal at 40 cm.  - No varices seen. Stomach:   + Streaking erythema in gastric body and antrum c/w nonerosive gastritis (no bx performed). Duodenum:   - The bulb and post bulbar mucosa is normal in appearance to the second portion. The duodenal folds appeared normal.     Therapies:  none    Specimen: none            Complications:   None were encountered during the procedure. EBL:  None.           Recommendations:   -EGD negative for GI blood loss source; proceed with colonoscopy    Unique Davidson MD  4/4/2019  12:50 PM

## 2019-04-04 NOTE — PERIOP NOTES
Endoscope was pre-cleaned at the bedside immediately following procedure by [facility] ET.

## 2019-04-04 NOTE — PROGRESS NOTES
Reason for Admission:   Patient came in from [facility] for decreased hgb and elevated inr. He lives in one story home with his wife. Patient was independent prior to coming to the hospital. He does not use dme at home. He works and drives. RRAT Score:   3 Plan for utilizing home health:  He denies using home health or snf in the past.      
                 
Current Advanced Directive/Advance Care Plan: Not on file Likelihood of Readmission:  Low Transition of Care Plan:   Patient plans to discharge home with family when medically stable. He will follow up with medical care team when discharged. VITA fontanez and made aware of patient's admission to [facility]. Care Management Interventions PCP Verified by CM: Yes Transition of Care Consult (CM Consult): Discharge Planning Current Support Network: Lives with Spouse Confirm Follow Up Transport: Family Plan discussed with Pt/Family/Caregiver: Yes Discharge Location Discharge Placement: Home

## 2019-04-04 NOTE — ROUTINE PROCESS
Clementina Rodriguez 1980 
[id number] Situation: 
Verbal report received from: [employer] Procedure: Procedure(s): ESOPHAGOGASTRODUODENOSCOPY (EGD) COLONOSCOPY 
ESOPHAGOGASTRODUODENAL (EGD) BIOPSY Background: 
 
Preoperative diagnosis: Anemia, unspecified type [D64.9] Hematochezia [K92.1] Postoperative diagnosis: EGD- Gastritis Colon- Internal hemorrhoids :  Dr. Jany Hills Assistant(s): Endoscopy Technician-1: Christian Hernandez Endoscopy RN-1: Osman Lang RN Specimens: * No specimens in log * H. Pylori  no Assessment: 
Intra-procedure medications Anesthesia gave intra-procedure sedation and medications, see anesthesia flow sheet yes Intravenous fluids: NS@ Lambert Caper Vital signs stable Abdominal assessment: round and soft Recommendation: 
Discharge patient per MD order Shruti Rowell Return to floor Family or Ceshima Amanda wife Permission to share finding with family or friend yes

## 2019-04-04 NOTE — PERIOP NOTES
Anesthesia reports 450mg Propofol, 100mg Lidocaine and 450mL NS given during procedure. Received report from anesthesia staff on vital signs and status of patient.

## 2019-04-04 NOTE — PROGRESS NOTES
Gastroenterology Daily Progress Note (Dr. Emre Frazier) [facility] Admit Date: 4/3/2019 Subjective:   
  
 
Current Facility-Administered Medications Medication Dose Route Frequency  0.9% sodium chloride infusion 250 mL  250 mL IntraVENous PRN  
 0.9% sodium chloride infusion 250 mL  250 mL IntraVENous PRN  
 cetirizine (ZYRTEC) tablet 10 mg  10 mg Oral DAILY  sodium chloride (NS) flush 5-40 mL  5-40 mL IntraVENous Q8H  
 sodium chloride (NS) flush 5-40 mL  5-40 mL IntraVENous PRN  
 acetaminophen (TYLENOL) tablet 650 mg  650 mg Oral Q6H PRN  
 ondansetron (ZOFRAN) injection 4 mg  4 mg IntraVENous Q6H PRN  
 0.9% sodium chloride infusion  75 mL/hr IntraVENous CONTINUOUS  
 0.9% sodium chloride 7,813 mL with folic acid 1 mg, thiamine 100 mg, mvi, adult no. 4 with vit K 10 mL infusion   IntraVENous DAILY  LORazepam (ATIVAN) tablet 2 mg  2 mg Oral Q1H PRN  
 LORazepam (ATIVAN) tablet 4 mg  4 mg Oral Q1H PRN  pantoprazole (PROTONIX) 40 mg in sodium chloride 0.9% 10 mL injection  40 mg IntraVENous Q12H  
 iopamidol (ISOVUE-370) 76 % injection 100 mL  100 mL IntraVENous RAD ONCE  
  
 
Objective:  
 
Visit Vitals /83 Pulse 84 Temp 97.9 °F (36.6 °C) Resp 16 Wt 70.8 kg (156 lb 1.4 oz) SpO2 100% BMI 22.40 kg/m² Blood pressure 139/83, pulse 84, temperature 97.9 °F (36.6 °C), resp. rate 16, weight 70.8 kg (156 lb 1.4 oz), SpO2 100 %. No intake/output data recorded. 04/02 1901 - 04/04 0700 In: 4611.3 [P.O.:4000] Out: - Intake/Output Summary (Last 24 hours) at 4/4/2019 5687 Last data filed at 4/4/2019 1805 Gross per 24 hour Intake 4611.3 ml Output  Net 4611.3 ml Physical Exam:  
 
 
General: 
Chest:  CTA, No rhonchi, rales or rubs. Heart: S1, S2, RRR 
GI: Soft, NT, ND + bowel sounds Extremities:  
CNS: CN II-XII normal. 
 
 
Labs:  
 
 
Recent Results (from the past 24 hour(s)) CBC WITH AUTOMATED DIFF  
 Collection Time: 04/03/19 10:18 AM  
Result Value Ref Range WBC 4.0 (L) 4.1 - 11.1 K/uL  
 RBC 3.17 (L) 4.10 - 5.70 M/uL HGB 6.0 (L) 12.1 - 17.0 g/dL HCT 20.2 (L) 36.6 - 50.3 % MCV 63.7 (L) 80.0 - 99.0 FL  
 MCH 18.9 (L) 26.0 - 34.0 PG  
 MCHC 29.7 (L) 30.0 - 36.5 g/dL RDW 30.8 (H) 11.5 - 14.5 % PLATELET 122 596 - 229 K/uL MPV ABNORMAL 8.9 - 12.9 FL  
 NRBC 2.0 (H) 0  WBC ABSOLUTE NRBC 0.08 (H) 0.00 - 0.01 K/uL NEUTROPHILS 71 32 - 75 % LYMPHOCYTES 16 12 - 49 % MONOCYTES 10 5 - 13 % EOSINOPHILS 1 0 - 7 % BASOPHILS 0 0 - 1 % IMMATURE GRANULOCYTES 2 (H) 0.0 - 0.5 % ABS. NEUTROPHILS 2.9 1.8 - 8.0 K/UL  
 ABS. LYMPHOCYTES 0.6 (L) 0.8 - 3.5 K/UL  
 ABS. MONOCYTES 0.4 0.0 - 1.0 K/UL  
 ABS. EOSINOPHILS 0.0 0.0 - 0.4 K/UL  
 ABS. BASOPHILS 0.0 0.0 - 0.1 K/UL  
 ABS. IMM. GRANS. 0.1 (H) 0.00 - 0.04 K/UL  
 DF AUTOMATED    
 RBC COMMENTS MICROCYTOSIS 2+ 
    
 RBC COMMENTS HYPOCHROMIA 2+ 
    
 RBC COMMENTS POLYCHROMASIA 1+ 
    
 RBC COMMENTS TEARDROP CELLS 
PRESENT 
    
 RBC COMMENTS POIKILOCYTOSIS 
PRESENT 
    
 RBC COMMENTS TARGET CELLS 
PRESENT 
    
METABOLIC PANEL, COMPREHENSIVE Collection Time: 04/03/19 10:18 AM  
Result Value Ref Range Sodium 134 (L) 136 - 145 mmol/L Potassium 3.5 3.5 - 5.1 mmol/L Chloride 98 97 - 108 mmol/L  
 CO2 27 21 - 32 mmol/L Anion gap 9 5 - 15 mmol/L Glucose 93 65 - 100 mg/dL BUN 6 6 - 20 MG/DL Creatinine 0.78 0.70 - 1.30 MG/DL  
 BUN/Creatinine ratio 8 (L) 12 - 20 GFR est AA >60 >60 ml/min/1.73m2 GFR est non-AA >60 >60 ml/min/1.73m2 Calcium 9.1 8.5 - 10.1 MG/DL Bilirubin, total 0.4 0.2 - 1.0 MG/DL  
 ALT (SGPT) 75 12 - 78 U/L  
 AST (SGOT) 145 (H) 15 - 37 U/L Alk. phosphatase 95 45 - 117 U/L Protein, total 8.0 6.4 - 8.2 g/dL Albumin 4.1 3.5 - 5.0 g/dL Globulin 3.9 2.0 - 4.0 g/dL A-G Ratio 1.1 1.1 - 2.2 TYPE + CROSSMATCH  Collection Time: 04/03/19 11:30 AM  
 Result Value Ref Range Crossmatch Expiration 04/06/2019 ABO/Rh(D) O POSITIVE Antibody screen NEG Unit number [id number] Blood component type RC LR,2 Unit division 00 Status of unit TRANSFUSED Crossmatch result Compatible Unit number [id number] Blood component type RC LR,2 Unit division 00 Status of unit ISSUED Crossmatch result Compatible Unit number [id number] Blood component type RC LR Unit division 00 Status of unit ISSUED Crossmatch result Compatible Unit number [id number] Blood component type RC LR Unit division 00 Status of unit ALLOCATED Crossmatch result Compatible OCCULT BLOOD, STOOL Collection Time: 04/03/19 12:01 PM  
Result Value Ref Range Occult blood, stool NEGATIVE  NEG PROTHROMBIN TIME + INR Collection Time: 04/03/19  1:05 PM  
Result Value Ref Range INR 3.0 (H) 0.9 - 1.1 Prothrombin time 29.3 (H) 9.0 - 11.1 sec URINALYSIS W/ REFLEX CULTURE Collection Time: 04/03/19  1:55 PM  
Result Value Ref Range Color YELLOW/STRAW Appearance CLEAR CLEAR Specific gravity 1.009 1.003 - 1.030    
 pH (UA) 6.0 5.0 - 8.0 Protein NEGATIVE  NEG mg/dL Glucose NEGATIVE  NEG mg/dL Ketone NEGATIVE  NEG mg/dL Bilirubin NEGATIVE  NEG Blood NEGATIVE  NEG Urobilinogen 0.2 0.2 - 1.0 EU/dL Nitrites NEGATIVE  NEG Leukocyte Esterase NEGATIVE  NEG    
 WBC 0-4 0 - 4 /hpf  
 RBC 0-5 0 - 5 /hpf Epithelial cells FEW FEW /lpf Bacteria NEGATIVE  NEG /hpf  
 UA:UC IF INDICATED CULTURE NOT INDICATED BY UA RESULT CNI Hyaline cast 0-2 0 - 5 /lpf HEMOGLOBIN Collection Time: 04/03/19 11:26 PM  
Result Value Ref Range HGB 6.2 (L) 12.1 - 17.0 g/dL LABRCNT(wbc:2,hgb:2,hct:2,plt:2,) Recent Labs 04/03/19 
1018 04/01/19 
1649 * 135  
K 3.5 4.0  
CL 98 95* CO2 27 24 BUN 6 6 CREA 0.78 0.73* GLU 93 95  
CA 9.1 8.9 LABRCNT(sgot:3,gpt:3,ap:3,tbiL:3,TP:3,ALB:3,GLOB:3,ggt:3,aml:3,amyp:3,lpse:3,hlpse:3) Recent Labs 04/03/19 
1305 04/01/19 
1658 INR 3.0* 6.0 PTP 29.3*  --   
 
Recent Labs 04/03/19 
1018 04/01/19 
1649 SGOT 145* 119* AP 95 66  
TP 8.0 6.7 ALB 4.1 4.3 GLOB 3.9  --   
LPSE  --  21 Impression: 
Acute GI blood loss anemia Chronic anticoagulation on warfarin H/O prior EtOH abuse Plan: 
Patient with Hgb 6 after receiving 2 units of prbcs already. He is taking warfarin for intracerebral thrombus and inr was 3 yesterday down from 6 on Monday without receiving Vit K or FFP. -proceed with planned EGD and colonoscopy today to locate bleeding source 
-check INR prior to procedures to see if rising or falling ; increased bleeding risk due to him having therapeutic anticoagulation Lluvia Larsen MD 
 
4/4/2019 [address] [facility], [address] [address] Loc: [phone]

## 2019-04-05 VITALS
HEART RATE: 72 BPM | OXYGEN SATURATION: 100 % | BODY MASS INDEX: 22.4 KG/M2 | DIASTOLIC BLOOD PRESSURE: 97 MMHG | TEMPERATURE: 98.1 F | WEIGHT: 156.09 LBS | RESPIRATION RATE: 18 BRPM | SYSTOLIC BLOOD PRESSURE: 151 MMHG

## 2019-04-05 LAB
ANION GAP SERPL CALC-SCNC: 7 MMOL/L (ref 5–15)
BASOPHILS # BLD: 0 K/UL (ref 0–0.1)
BASOPHILS NFR BLD: 0 % (ref 0–1)
BUN SERPL-MCNC: 4 MG/DL (ref 6–20)
BUN/CREAT SERPL: 6 (ref 12–20)
CALCIUM SERPL-MCNC: 8.8 MG/DL (ref 8.5–10.1)
CHLORIDE SERPL-SCNC: 106 MMOL/L (ref 97–108)
CO2 SERPL-SCNC: 25 MMOL/L (ref 21–32)
CREAT SERPL-MCNC: 0.71 MG/DL (ref 0.7–1.3)
DIFFERENTIAL METHOD BLD: ABNORMAL
EOSINOPHIL # BLD: 0.1 K/UL (ref 0–0.4)
EOSINOPHIL NFR BLD: 2 % (ref 0–7)
ERYTHROCYTE [DISTWIDTH] IN BLOOD BY AUTOMATED COUNT: 32.4 % (ref 11.5–14.5)
GLUCOSE SERPL-MCNC: 101 MG/DL (ref 65–100)
HCT VFR BLD AUTO: 30.1 % (ref 36.6–50.3)
HGB BLD-MCNC: 9.3 G/DL (ref 12.1–17)
IMM GRANULOCYTES # BLD AUTO: 0.1 K/UL (ref 0–0.04)
IMM GRANULOCYTES NFR BLD AUTO: 1 % (ref 0–0.5)
LYMPHOCYTES # BLD: 1.4 K/UL (ref 0.8–3.5)
LYMPHOCYTES NFR BLD: 24 % (ref 12–49)
MCH RBC QN AUTO: 22.7 PG (ref 26–34)
MCHC RBC AUTO-ENTMCNC: 30.9 G/DL (ref 30–36.5)
MCV RBC AUTO: 73.6 FL (ref 80–99)
MONOCYTES # BLD: 0.4 K/UL (ref 0–1)
MONOCYTES NFR BLD: 7 % (ref 5–13)
NEUTS SEG # BLD: 3.7 K/UL (ref 1.8–8)
NEUTS SEG NFR BLD: 66 % (ref 32–75)
NRBC # BLD: 0.09 K/UL (ref 0–0.01)
NRBC BLD-RTO: 1.6 PER 100 WBC
PLATELET # BLD AUTO: 345 K/UL (ref 150–400)
POTASSIUM SERPL-SCNC: 3.6 MMOL/L (ref 3.5–5.1)
RBC # BLD AUTO: 4.09 M/UL (ref 4.1–5.7)
RBC MORPH BLD: ABNORMAL
SODIUM SERPL-SCNC: 138 MMOL/L (ref 136–145)
WBC # BLD AUTO: 5.7 K/UL (ref 4.1–11.1)

## 2019-04-05 PROCEDURE — 80048 BASIC METABOLIC PNL TOTAL CA: CPT

## 2019-04-05 PROCEDURE — 74011250637 HC RX REV CODE- 250/637: Performed by: INTERNAL MEDICINE

## 2019-04-05 PROCEDURE — 85025 COMPLETE CBC W/AUTO DIFF WBC: CPT

## 2019-04-05 PROCEDURE — 94762 N-INVAS EAR/PLS OXIMTRY CONT: CPT

## 2019-04-05 PROCEDURE — C9113 INJ PANTOPRAZOLE SODIUM, VIA: HCPCS | Performed by: INTERNAL MEDICINE

## 2019-04-05 PROCEDURE — 74011000250 HC RX REV CODE- 250: Performed by: INTERNAL MEDICINE

## 2019-04-05 PROCEDURE — 76060000031 HC ANESTHESIA FIRST 0.5 HR: Performed by: SPECIALIST

## 2019-04-05 PROCEDURE — 77030010306 HC SYS DEL CAP STRC -C: Performed by: SPECIALIST

## 2019-04-05 PROCEDURE — 74011250636 HC RX REV CODE- 250/636: Performed by: INTERNAL MEDICINE

## 2019-04-05 PROCEDURE — 76040000019: Performed by: SPECIALIST

## 2019-04-05 PROCEDURE — 36415 COLL VENOUS BLD VENIPUNCTURE: CPT

## 2019-04-05 PROCEDURE — 94761 N-INVAS EAR/PLS OXIMETRY MLT: CPT

## 2019-04-05 RX ADMIN — SODIUM CHLORIDE 75 ML/HR: 900 INJECTION, SOLUTION INTRAVENOUS at 05:16

## 2019-04-05 RX ADMIN — CETIRIZINE HYDROCHLORIDE 10 MG: 10 TABLET, FILM COATED ORAL at 09:31

## 2019-04-05 RX ADMIN — FOLIC ACID: 5 INJECTION, SOLUTION INTRAMUSCULAR; INTRAVENOUS; SUBCUTANEOUS at 09:31

## 2019-04-05 RX ADMIN — PANTOPRAZOLE SODIUM 40 MG: 40 INJECTION, POWDER, FOR SOLUTION INTRAVENOUS at 05:16

## 2019-04-05 RX ADMIN — Medication 10 ML: at 05:17

## 2019-04-05 NOTE — PROGRESS NOTES
Reviewed discharge paperwork with patient and wife. Discussed medications to stop and follow up appointments. Patient leaving with wife. Iv removed

## 2019-04-05 NOTE — PROGRESS NOTES
Gastroenterology Daily Progress Note [facility] 
 (Alicia Iglesias PA-C covering for Dr. Silvana Marie) Admit Date: 4/3/2019 Subjective:   
  
Patient is resting comfortably in bed, f/u for anemia with suspected GI bleed. He had SB capsule placed this morning. He is feeling well otherwise. No N/V, denies any abdominal pain. Is hungry and ready for a full diet. Reports a normal BM yesterday, no melena or hematochezia. Hgb stable at 9.3 today EGD 4/4/19:  
Stomach:  
+ Streaking erythema in gastric body and antrum c/w nonerosive gastritis (no bx performed). Colonoscopy 4/4/19:  
1.  Scope advanced to the cecum and terminal ileum. 2.  Preparation was good. 3.  Normal mucosa throughout colon and terminal ileum. No blood see in colon. 4.  There were friable internal hemorrhoids. 5.  No polyps seen. Current Facility-Administered Medications Medication Dose Route Frequency  0.9% sodium chloride infusion 250 mL  250 mL IntraVENous PRN  
 sodium chloride (NS) flush 5-40 mL  5-40 mL IntraVENous Q8H  
 sodium chloride (NS) flush 5-40 mL  5-40 mL IntraVENous PRN  
 0.9% sodium chloride infusion 250 mL  250 mL IntraVENous PRN  
 cetirizine (ZYRTEC) tablet 10 mg  10 mg Oral DAILY  sodium chloride (NS) flush 5-40 mL  5-40 mL IntraVENous Q8H  
 sodium chloride (NS) flush 5-40 mL  5-40 mL IntraVENous PRN  
 acetaminophen (TYLENOL) tablet 650 mg  650 mg Oral Q6H PRN  
 ondansetron (ZOFRAN) injection 4 mg  4 mg IntraVENous Q6H PRN  
 0.9% sodium chloride infusion  75 mL/hr IntraVENous CONTINUOUS  
 0.9% sodium chloride 2,129 mL with folic acid 1 mg, thiamine 100 mg, mvi, adult no. 4 with vit K 10 mL infusion   IntraVENous DAILY  LORazepam (ATIVAN) tablet 2 mg  2 mg Oral Q1H PRN  
 LORazepam (ATIVAN) tablet 4 mg  4 mg Oral Q1H PRN  pantoprazole (PROTONIX) 40 mg in sodium chloride 0.9% 10 mL injection  40 mg IntraVENous Q12H Objective: Visit Vitals BP (!) 150/96 Pulse 62 Temp 97.7 °F (36.5 °C) Resp 18 Wt 70.8 kg (156 lb 1.4 oz) SpO2 100% BMI 22.40 kg/m² Blood pressure (!) 150/96, pulse 62, temperature 97.7 °F (36.5 °C), resp. rate 18, weight 70.8 kg (156 lb 1.4 oz), SpO2 100 %. No intake/output data recorded. 04/03 1901 - 04/05 0700 In: 5785 [P.O.:4000; I.V.:1125] Out: - Intake/Output Summary (Last 24 hours) at 4/5/2019 1280 Last data filed at 4/5/2019 1493 Gross per 24 hour Intake 1475 ml Output  Net 1475 ml Physical Exam:  
 
 
General: BM, pleasant in NAD Chest:  CTA, No rhonchi, rales or rubs. Heart: S1, S2, RRR 
GI: Soft, NT, ND + bowel sounds Extremities: no cyanosis or edema CNS: CN II-XII normal. 
 
 
Labs:  
 
 
Recent Results (from the past 24 hour(s)) METABOLIC PANEL, COMPREHENSIVE Collection Time: 04/04/19  8:46 AM  
Result Value Ref Range Sodium 136 136 - 145 mmol/L Potassium 3.9 3.5 - 5.1 mmol/L Chloride 103 97 - 108 mmol/L  
 CO2 27 21 - 32 mmol/L Anion gap 6 5 - 15 mmol/L Glucose 99 65 - 100 mg/dL BUN 5 (L) 6 - 20 MG/DL Creatinine 0.83 0.70 - 1.30 MG/DL  
 BUN/Creatinine ratio 6 (L) 12 - 20 GFR est AA >60 >60 ml/min/1.73m2 GFR est non-AA >60 >60 ml/min/1.73m2 Calcium 9.1 8.5 - 10.1 MG/DL Bilirubin, total 0.9 0.2 - 1.0 MG/DL  
 ALT (SGPT) 71 12 - 78 U/L  
 AST (SGOT) 119 (H) 15 - 37 U/L Alk. phosphatase 70 45 - 117 U/L Protein, total 8.0 6.4 - 8.2 g/dL Albumin 4.2 3.5 - 5.0 g/dL Globulin 3.8 2.0 - 4.0 g/dL A-G Ratio 1.1 1.1 - 2.2 PROTHROMBIN TIME + INR Collection Time: 04/04/19  8:46 AM  
Result Value Ref Range INR 1.4 (H) 0.9 - 1.1 Prothrombin time 14.2 (H) 9.0 - 11.1 sec CBC WITH AUTOMATED DIFF Collection Time: 04/04/19 10:47 AM  
Result Value Ref Range WBC 4.9 4.1 - 11.1 K/uL  
 RBC 4.12 4.10 - 5.70 M/uL HGB 9.3 (L) 12.1 - 17.0 g/dL HCT 29.1 (L) 36.6 - 50.3 %  MCV 70.6 (L) 80.0 - 99.0 FL  
 MCH 22.6 (L) 26.0 - 34.0 PG  
 MCHC 32.0 30.0 - 36.5 g/dL PLATELET 926 993 - 106 K/uL NRBC 3.4 (H) 0  WBC ABSOLUTE NRBC 0.17 (H) 0.00 - 0.01 K/uL NEUTROPHILS 70 32 - 75 % LYMPHOCYTES 18 12 - 49 % MONOCYTES 10 5 - 13 % EOSINOPHILS 1 0 - 7 % BASOPHILS 0 0 - 1 % IMMATURE GRANULOCYTES 1 (H) 0.0 - 0.5 % ABS. NEUTROPHILS 3.3 1.8 - 8.0 K/UL  
 ABS. LYMPHOCYTES 0.9 0.8 - 3.5 K/UL  
 ABS. MONOCYTES 0.5 0.0 - 1.0 K/UL  
 ABS. EOSINOPHILS 0.1 0.0 - 0.4 K/UL  
 ABS. BASOPHILS 0.0 0.0 - 0.1 K/UL  
 ABS. IMM. GRANS. 0.1 (H) 0.00 - 0.04 K/UL  
 DF AUTOMATED    
 RBC COMMENTS MICROCYTOSIS 2+ 
    
 RBC COMMENTS HYPOCHROMIA 2+ 
    
 RBC COMMENTS TARGET CELLS 
PRESENT 
    
 RBC COMMENTS TEARDROP CELLS 
PRESENT 
    
 RBC COMMENTS SCHISTOCYTES PRESENT 
    
 RBC COMMENTS POIKILOCYTOSIS 2+ HEMOGLOBIN Collection Time: 04/04/19  5:11 PM  
Result Value Ref Range HGB 9.1 (L) 12.1 - 17.0 g/dL CBC WITH AUTOMATED DIFF Collection Time: 04/05/19  5:27 AM  
Result Value Ref Range WBC 5.7 4.1 - 11.1 K/uL  
 RBC 4.09 (L) 4.10 - 5.70 M/uL HGB 9.3 (L) 12.1 - 17.0 g/dL HCT 30.1 (L) 36.6 - 50.3 % MCV 73.6 (L) 80.0 - 99.0 FL  
 MCH 22.7 (L) 26.0 - 34.0 PG  
 MCHC 30.9 30.0 - 36.5 g/dL RDW 32.4 (H) 11.5 - 14.5 % PLATELET 552 206 - 242 K/uL NRBC 1.6 (H) 0  WBC ABSOLUTE NRBC 0.09 (H) 0.00 - 0.01 K/uL NEUTROPHILS 66 32 - 75 % LYMPHOCYTES 24 12 - 49 % MONOCYTES 7 5 - 13 % EOSINOPHILS 2 0 - 7 % BASOPHILS 0 0 - 1 % IMMATURE GRANULOCYTES 1 (H) 0.0 - 0.5 % ABS. NEUTROPHILS 3.7 1.8 - 8.0 K/UL  
 ABS. LYMPHOCYTES 1.4 0.8 - 3.5 K/UL  
 ABS. MONOCYTES 0.4 0.0 - 1.0 K/UL  
 ABS. EOSINOPHILS 0.1 0.0 - 0.4 K/UL  
 ABS. BASOPHILS 0.0 0.0 - 0.1 K/UL  
 ABS. IMM. GRANS. 0.1 (H) 0.00 - 0.04 K/UL  
 DF AUTOMATED    
 RBC COMMENTS HYPOCHROMIA 2+ 
    
 RBC COMMENTS ANISOCYTOSIS 4+  RBC COMMENTS TARGET CELLS 
PRESENT 
    
METABOLIC PANEL, BASIC  
 Collection Time: 04/05/19  5:27 AM  
Result Value Ref Range Sodium 138 136 - 145 mmol/L Potassium 3.6 3.5 - 5.1 mmol/L Chloride 106 97 - 108 mmol/L  
 CO2 25 21 - 32 mmol/L Anion gap 7 5 - 15 mmol/L Glucose 101 (H) 65 - 100 mg/dL BUN 4 (L) 6 - 20 MG/DL Creatinine 0.71 0.70 - 1.30 MG/DL  
 BUN/Creatinine ratio 6 (L) 12 - 20 GFR est AA >60 >60 ml/min/1.73m2 GFR est non-AA >60 >60 ml/min/1.73m2 Calcium 8.8 8.5 - 10.1 MG/DL  
LABRCNT(wbc:2,hgb:2,hct:2,plt:2,) Recent Labs 04/05/19 
0527 04/04/19 
0846 04/03/19 
1018  136 134* K 3.6 3.9 3.5  103 98 CO2 25 27 27 BUN 4* 5* 6  
CREA 0.71 0.83 0.78 * 99 93 CA 8.8 9.1 9.1 LABRCNT(sgot:3,gpt:3,ap:3,tbiL:3,TP:3,ALB:3,GLOB:3,ggt:3,aml:3,amyp:3,lpse:3,hlpse:3) Recent Labs 04/04/19 
7335 04/03/19 
1305 INR 1.4* 3.0*  
PTP 14.2* 29.3* Recent Labs 04/04/19 
0846 04/03/19 
1018 SGOT 119* 145* AP 70 95  
TP 8.0 8.0 ALB 4.2 4.1 GLOB 3.8 3.9 Impression: 
Acute GI blood loss anemia Chronic anticoagulation on warfarin H/O prior EtOH abuse Plan: 
Patient had capsule placed today to further evaluate SB. He is otherwise feeling much better with return of appetite. No further bleeding reported and hgb is now stable at 9.3. Can resume Warfarin if primary care team feels necessary. Discussed avoidance of NSAIDs given gastritis seen on EGD, also discussed importance of abstinence from EtOH, pt agrees. We will arrange for f/u in office in 2-3 weeks to discuss capsule results. Odra [address], PA 
 
4/5/2019 [address] [facility], [address] [address] Loc: [phone]

## 2019-04-05 NOTE — PROGRESS NOTES
Spiritual Care Partner Volunteer visited patient in [address] on April 5, 2019. Documented by: 
MAYNOR Rodriguez Div  Paging Service [phone]

## 2019-04-05 NOTE — PROGRESS NOTES
Patient is being discharged to home with family today. He will follow up with medical team when discharged.

## 2019-04-05 NOTE — PROGRESS NOTES
Problem: Falls - Risk of 
Goal: *Absence of Falls Description Document Elizabet Correia Fall Risk and appropriate interventions in the flowsheet. Outcome: Progressing Towards Goal 
  
Problem: Anemia Care Plan (Adult and Pediatric) Goal: *Labs within defined limits Outcome: Progressing Towards Goal 
  
Problem: Anemia Care Plan (Adult and Pediatric) Goal: *Tolerates increased activity Outcome: Progressing Towards Goal 
  
Problem: Patient Education: Go to Patient Education Activity Goal: Patient/Family Education Outcome: Progressing Towards Goal

## 2019-04-05 NOTE — DISCHARGE SUMMARY
Hospitalist Discharge Summary     Patient ID:  Jorge Yusuf  [id number]  45 y.o.  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 Brandy Aguero reports that he was diagnosed to have dural venous sinus thrombosis about 1 and half years ago and was placed on Coumadin for about 1 year and was supposed to be stopped after that but patient continued taking Coumadin and did not get his INR checked R see his primary care physician in the last 6 months.  Reports feeling weak in general involving both his arms and legs.  He also reports generalized abdominal pain which is mild, 2 x 10, nonradiating, without any aggravating or relieving factors.  He reports he sometimes has bright red blood in the stool intermittently. Brandy Aguero was having dark stool while he was on iron supplements but he stopped them about 6 months ago. Reginaldo Fuentesoter not report any chest pain or shortness of breath.  He reports drinking about a sixpack of beers every day. On arrival to the hospital his vital signs were within normal limits.  Lab work showed a hemoglobin of 6.0. Flavia Reinier was 3.0.  BMP showed a AST of 145.  1 unit of PRBC was given a GI consultation was placed    ______________________________________________________________________  DISCHARGE SUMMARY/HOSPITAL COURSE:  for full details see H&P, daily progress notes, labs, consult notes.      Severe anemia suspect GI losses due to supratherapeutic INR from Coumadin  Baseline hemoglobin is around 12 and currently hemoglobin is 6  He does report intermittent bleeding in the stool  Iron profile done few days ago shows sever iron def anemia so will give iv iron. Start Protonix 40 mg IV every 12  S/p transfusion 2units RBCs  EGD and colonoscopy 4/4: no source of bleeding on EGD, internal hemorrhoids only finding on colonoscopy  Pt to undergo SB pill cam procedure, OP follow up for results  Transfuse to keep hemoglobin more than 7    Chronic alcoholism with concern for alcohol withdrawal  Reports drinking 6 pack of beers every day  Start Ativan with alcohol protocol  Start banana bag  Counseled regarding alcohol cessation    History of dural venous sinus thrombosis on anticoagulation  Acquired coagulopathy due to warfarin   He was supposed to be on warfarin for 1 year and must have been discontinued after that but has been taking  INR is 3.0  Discontinue warfarin  Follow INR. Acute transaminitis  AST is elevated at 145 on admission likely related to alcohol, trending down  INR is 3.0 but he is on Coumadin so hepatitis discriminant score unreliable    _______________________________________________________________________  Patient seen and examined by me on discharge day. Pertinent Findings:  Gen:    Not in distress  Chest: Clear lungs  CVS:   Regular rhythm. No edema  Abd:  Soft, not distended, not tender  Neuro:  Alert, oriented x 3  _______________________________________________________________________  DISCHARGE MEDICATIONS:   Current Discharge Medication List      CONTINUE these medications which have NOT CHANGED    Details   cetirizine (ZYRTEC) 10 mg tablet Take 10 mg by mouth daily.       pantoprazole (PROTONIX) 40 mg tablet TAKE 1 TABLET BY MOUTH DAILY FOR STOMACH ACID  Qty: 90 Tab, Refills: 3    Comments: Generic For:PROTONIX 40MG    N O T I C E    Last quantity doesn't match original quantity  Associated Diagnoses: Epigastric pain      ondansetron hcl (ZOFRAN) 4 mg tablet TAKE 1 TABLET BY MOUTH EVERY EIGHT (8) HOURS AS NEEDED FOR NAUSEA. Qty: 30 Tab, Refills: 0    Comments: Generic For:ZOFRAN 4MG  Associated Diagnoses: Nausea         STOP taking these medications       warfarin sodium (COUMADIN PO) Comments:   Reason for Stopping:               My Recommended Diet, Activity, Wound Care, and follow-up labs are listed in the patient's Discharge Insturctions which I have personally completed and reviewed.     ______________________________________________________________________    Risk of deterioration: Low    Condition at Discharge:  Stable  ______________________________________________________________________    Disposition  Home with family, no needs  ______________________________________________________________________    Care Plan discussed with:   Patient, Family, RN, Care Manager, Consultant    ______________________________________________________________________    Code Status: Full Code  ______________________________________________________________________      Follow up with:   PCP : Ok Mccoy MD  Follow-up Information     Follow up With Specialties Details Why Contact Info    Ok Mccoy, [address] [place] [address] Jessica Bustamante [address]  [phone]                Total time in minutes spent coordinating this discharge (includes going over instructions, follow-up, prescriptions, and preparing report for sign off to her PCP) :  > 30  minutes    Signed:  Severiano Mulligan DO

## 2019-04-05 NOTE — PROGRESS NOTES
Spiritual Care Assessment/Progress Note [facility] 
 
 
NAME: Wojciech Bingham      MRN: [id number] AGE: 41 y.o. SEX: male Yarsanism Affiliation: [facility]  
Language: [place] 4/5/2019     Total Time (in minutes): 40 Spiritual Assessment begun in [facility] NEUROSCIENCE TELEMETRY through conversation with: 
  
    [x]Patient        [x] Family    [] Friend(s) Reason for Consult: Initial/Spiritual assessment, patient floor Spiritual beliefs: (Please include comment if needed) [x] Identifies with a marian tradition:     
   [x] Supported by a marian community:        
   [] Claims no spiritual orientation:       
   [] Seeking spiritual identity:            
   [] Adheres to an individual form of spirituality:       
   [] Not able to assess:                   
 
    
Identified resources for coping:  
   [x] Prayer                           
   [] Music                  [] Guided Imagery [x] Family/friends                 [] Pet visits [] Devotional reading                         [] Unknown 
   [x] Other: Spiritual guidance Interventions offered during this visit: (See comments for more details) Patient Interventions: Affirmation of emotions/emotional suffering, Affirmation of marian, Coping skills reviewed/reinforced, Iconic (affirming the presence of God/Higher Power), Initial/Spiritual assessment, patient floor, Normalization of emotional/spiritual concerns, Yarsanism beliefs/image of God discussed Family/Friend(s): Affirmation of emotions/emotional suffering, Affirmation of marian, Coping skills reviewed/reinforced, Iconic (affirming the presence of God/Higher Power), Normalization of emotional/spiritual concerns Plan of Care: 
 
 [x] Support spiritual and/or cultural needs  
 [] Support AMD and/or advance care planning process    
 [] Support grieving process 
 [] Coordinate Rites and/or Rituals [] Coordination with community clergy [] No spiritual needs identified at this time 
 [] Detailed Plan of Care below (See Comments)  [] Make referral to Music Therapy 
[] Make referral to Pet Therapy    
[] Make referral to Addiction services 
[] Make referral to [facility] 
[] Make referral to Spiritual Care Partner 
[] No future visits requested       
[x] Follow up visits as needed Comments: Initial spiritual assessment in Neuroscience Telemetry. Patient/family shared about history, following God's call, and life. Provided effective listening. Consulted with patient and patient's wife. Advised of  Availability. [address] Rm  Paging Service [phone]

## 2019-04-05 NOTE — ROUTINE PROCESS
EGD- Gastritis Colon- Internal hemorrhoids EGD- Gastritis Colon- Internal hemorrhoids Bedside and Verbal shift change report given to Kathy, RN (oncoming nurse) by [employer], RN (offgoing nurse). Report included the following information SBAR, Kardex, Intake/Output and MAR. Zone Phone:   [phone] Significant changes during shift:  Pt accidentally pulled out IV, inserted a new IV. No other significant changes Patient Information Derick Lang 41 y.o. 
4/3/2019 11:35 AM by Zain Singh MD. Derick Lang was admitted from Home 
 
Problem List 
 
Patient Active Problem List  
 Diagnosis Date Noted  Acute anemia 04/03/2019  Hematochezia 04/03/2019  GERD (gastroesophageal reflux disease) 04/03/2019  Current use of long term anticoagulation 04/03/2019  NSAID long-term use 04/03/2019  Iron deficiency anemia 07/14/2017  Hyperlipidemia 07/14/2017  Dural venous sinus thrombosis 07/12/2017  Acute pancreatitis 12/27/2014  Abnormal LFTs 12/27/2014  Hyponatremia 12/27/2014  Accelerated hypertension 12/27/2014  Alcohol dependence ([facility] [address] 12/27/2014 Past Medical History:  
Diagnosis Date  Bleeding hemorrhoid  Elevated cholesterol  Hypertension  Pancreatitis Core Measures: CVA: No No 
CHF:No No 
PNA:No No 
 
 
Activity Status: OOB to Chair Yes Ambulated this shift Yes Bed Rest No 
 
Supplemental O2: (If Applicable) On Room air. Vignesh Ruib LINES AND DRAINS: 
 
PIV 
 
DVT prophylaxis: DVT prophylaxis Med- No 
DVT prophylaxis SCD or ARTIE- Refused Wounds: (If Applicable) Wounds- No 
 
Location Patient Safety: 
 
Falls Score Total Score: 1 Safety Level_______ Bed Alarm On? No 
Sitter? No 
 
Plan for upcoming shift: safety Discharge Plan: Yes , home with family when medically stable. Active Consults: 
IP CONSULT TO HOSPITALIST 
IP CONSULT TO GASTROENTEROLOGY

## 2019-04-05 NOTE — PROGRESS NOTES
Hospitalist Progress Note NAME: Clementina Rodriguez :  1980 MRN:  [id number] Assessment / Plan: 
Severe anemia suspect GI losses due to supratherapeutic INR from Coumadin Baseline hemoglobin is around 12 and currently hemoglobin is 6 He does report intermittent bleeding in the stool Iron profile done few days ago shows sever iron def anemia so will give iv iron. Start Protonix 40 mg IV every 12 S/p transfusion 2units RBCs EGD and colonoscopy today Transfuse to keep hemoglobin more than 7 Chronic alcoholism with concern for alcohol withdrawal 
Reports drinking 6 pack of beers every day Start Ativan with alcohol protocol Start banana bag 
Counseled regarding alcohol cessation History of dural venous sinus thrombosis on anticoagulation Acquired coagulopathy due to warfarin He was supposed to be on warfarin for 1 year and must have been discontinued after that but has been taking INR is 3.0 Discontinue warfarin Follow INR. Acute transaminitis AST is elevated at 145 on admission likely related to alcohol, trending down INR is 3.0 but he is on Coumadin so hepatitis discriminant score unreliable 
  
  
Code Status: full Surrogate Decision Maker: Wife Andrew Gonzalez 
  
DVT Prophylaxis: [facility] GI Prophylaxis: not indicated 
  
Baseline: From home independent Subjective: Chief Complaint / Reason for Physician Visit No acute complaints, denies bloody stools, hematemesis. Denies abd pain, n/v 
 
Review of Systems: 
Symptom Y/N Comments  Symptom Y/N Comments Fever/Chills n   Chest Pain n   
Poor Appetite    Edema Cough    Abdominal Pain n   
Sputum    Joint Pain SOB/ALEX n   Pruritis/Rash Nausea/vomit n   Tolerating PT/OT Diarrhea n   Tolerating Diet Constipation n   Other Could NOT obtain due to:   
 
Objective: VITALS:  
Last 24hrs VS reviewed since prior progress note. Most recent are: 
Patient Vitals for the past 24 hrs: Temp Pulse Resp BP SpO2  
04/04/19 1929 98.2 °F (36.8 °C) 76 20 (!) 133/91 100 % 04/04/19 1409 98.3 °F (36.8 °C) 70 20 (!) 139/93 100 % 04/04/19 1334  69  (!) 136/92 100 % 04/04/19 1329  71 17 145/84 100 % 04/04/19 1325  69 14 156/54 100 % 04/04/19 1322  66 23 134/81 100 % 04/04/19 1319  70 14 131/87 99 % 04/04/19 1317  68 12 150/84 100 % 04/04/19 1315  70 14 125/55 100 % 04/04/19 1312  66 21 126/82   
04/04/19 1310  73 25 130/83   
04/04/19 1304  76 21 128/58 98 % 04/04/19 1300 97.8 °F (36.6 °C) 81 10 (!) 141/102 92 % 04/04/19 1250  74 17 116/78 100 % 04/04/19 1139 98 °F (36.7 °C) 68 20 147/86 100 % 04/04/19 1016 98.3 °F (36.8 °C) 85 16 (!) 141/91 100 % 04/04/19 0728 97.9 °F (36.6 °C) 84 16 139/83   
04/04/19 0713 97.9 °F (36.6 °C) 74 16 131/87   
04/04/19 0530 98.6 °F (37 °C) 72 16 131/88   
04/04/19 0512  76 16 (!) 146/97 100 % 04/04/19 0442  70 16 (!) 137/95 100 % 04/04/19 0412  61 16 139/90 100 % 04/04/19 0342 98.2 °F (36.8 °C) 72 16 127/83 100 % 04/04/19 0312 98.2 °F (36.8 °C) 72 16 121/85 100 % 04/04/19 0242 98.2 °F (36.8 °C) 74 16 131/86 100 % 04/04/19 0227 98.2 °F (36.8 °C) 76 16 137/76 100 % 04/04/19 0212 98 °F (36.7 °C) 78 16 (!) 146/108 100 % 04/04/19 0157 98 °F (36.7 °C) 73 16 121/77 100 % 04/04/19 0142 98 °F (36.7 °C) 70 16 125/82 100 % 04/03/19 2338 97.9 °F (36.6 °C) 74 18 108/89 100 % Intake/Output Summary (Last 24 hours) at 4/4/2019 7721 Last data filed at 4/4/2019 1249 Gross per 24 hour Intake 5110 ml Output  Net 5110 ml PHYSICAL EXAM: 
General: WD, WN. Alert, cooperative, no acute distress   
EENT:  EOMI. Anicteric sclerae. MMM Resp:  CTA bilaterally, no wheezing or rales. No accessory muscle use CV:  Regular  rhythm,  No edema GI:  Soft, Non distended, Non tender.  +Bowel sounds Neurologic:  Alert and oriented X 3, normal speech, Psych:   Good insight. Not anxious nor agitated Skin:  No rashes. No jaundice Reviewed most current lab test results and cultures  YES Reviewed most current radiology test results   YES Review and summation of old records today    NO Reviewed patient's current orders and MAR    YES 
PMH/SH reviewed - no change compared to H&P 
________________________________________________________________________ Care Plan discussed with: 
  Comments Patient x Family RN x Care Manager Consultant  x Multidiciplinary team rounds were held today with , nursing, pharmacist and clinical coordinator. Patient's plan of care was discussed; medications were reviewed and discharge planning was addressed. ________________________________________________________________________ Total NON critical care TIME: 30   Minutes Total CRITICAL CARE TIME Spent:   Minutes non procedure based Comments >50% of visit spent in counseling and coordination of care x   
________________________________________________________________________ Raynell Party, DO  
 
Procedures: see electronic medical records for all procedures/Xrays and details which were not copied into this note but were reviewed prior to creation of Plan. LABS: 
I reviewed today's most current labs and imaging studies. Pertinent labs include: 
Recent Labs 04/04/19 
1711 04/04/19 
1047 04/03/19 
2326 04/03/19 
1018 WBC  --  4.9  --  4.0* HGB 9.1* 9.3* 6.2* 6.0*  
HCT  --  29.1*  --  20.2* PLT  --  320  --  292 Recent Labs 04/04/19 
0846 04/03/19 
1305 04/03/19 
1018   --  134* K 3.9  --  3.5   --  98  
CO2 27  --  27  
GLU 99  --  93 BUN 5*  --  6  
CREA 0.83  --  0.78 CA 9.1  --  9.1 ALB 4.2  --  4.1 TBILI 0.9  --  0.4 SGOT 119*  --  145* ALT 71  --  75 INR 1.4* 3.0*  --   
 
 
Signed: Riaz Taylor, DO

## 2019-04-05 NOTE — PERIOP NOTES
Gave rpt to day nurse and times for drinking and eating. Instructed patient and nurse to watch for blue blinking light. Swallowed pill cam without difficulty. Capsule # [id number], lot # [id number], exp date 4/2/20

## 2019-04-06 LAB
ABO + RH BLD: NORMAL
BLD PROD TYP BPU: NORMAL
BLOOD GROUP ANTIBODIES SERPL: NORMAL
BPU ID: NORMAL
CROSSMATCH RESULT,%XM: NORMAL
SPECIMEN EXP DATE BLD: NORMAL
STATUS OF UNIT,%ST: NORMAL
UNIT DIVISION, %UDIV: 0

## 2019-04-10 ENCOUNTER — OFFICE VISIT (OUTPATIENT)
Dept: FAMILY MEDICINE CLINIC | Age: 39
End: 2019-04-10

## 2019-04-10 VITALS
HEART RATE: 85 BPM | HEIGHT: 70 IN | BODY MASS INDEX: 21.99 KG/M2 | DIASTOLIC BLOOD PRESSURE: 82 MMHG | TEMPERATURE: 98.4 F | OXYGEN SATURATION: 96 % | WEIGHT: 153.6 LBS | RESPIRATION RATE: 18 BRPM | SYSTOLIC BLOOD PRESSURE: 131 MMHG

## 2019-04-10 DIAGNOSIS — D50.9 IRON DEFICIENCY ANEMIA, UNSPECIFIED IRON DEFICIENCY ANEMIA TYPE: ICD-10-CM

## 2019-04-10 DIAGNOSIS — E78.1 HYPERTRIGLYCERIDEMIA: ICD-10-CM

## 2019-04-10 DIAGNOSIS — R79.89 ABNORMAL LFTS: ICD-10-CM

## 2019-04-10 DIAGNOSIS — K21.9 GASTROESOPHAGEAL REFLUX DISEASE WITHOUT ESOPHAGITIS: Primary | ICD-10-CM

## 2019-04-10 PROBLEM — G08 DURAL VENOUS SINUS THROMBOSIS: Status: RESOLVED | Noted: 2017-07-12 | Resolved: 2019-04-10

## 2019-04-10 RX ORDER — LANOLIN ALCOHOL/MO/W.PET/CERES
325 CREAM (GRAM) TOPICAL
COMMUNITY
End: 2020-02-27

## 2019-04-10 NOTE — PATIENT INSTRUCTIONS
Patellofemoral Pain Syndrome (Runner's Knee): Exercises  Your Care Instructions  Here are some examples of typical rehabilitation exercises for your condition. Start each exercise slowly. Ease off the exercise if you start to have pain. Your doctor or physical therapist will tell you when you can start these exercises and which ones will work best for you. How to do the exercises  Calf wall stretch    1. Stand facing a wall with your hands on the wall at about eye level. Put your affected leg about a step behind your other leg. 2. Keeping your back leg straight and your back heel on the floor, bend your front knee and gently bring your hip and chest toward the wall until you feel a stretch in the calf of your back leg. 3. Hold the stretch for at least 15 to 30 seconds. 4. Repeat 2 to 4 times. 5. Repeat steps 1 through 4, but this time keep your back knee bent. Quadriceps stretch    1. If you are not steady on your feet, hold on to a chair, counter, or wall. 2. Bend your affected leg, and reach behind you to grab the front of your foot or ankle with the hand on the same side. For example, if you are stretching your right leg, use your right hand. 3. Keeping your knees next to each other, pull your foot toward your buttock until you feel a gentle stretch across the front of your hip and down the front of your thigh. Your knee should be pointed directly to the ground, and not out to the side. 4. Hold the stretch for at least 15 to 30 seconds. 5. Repeat 2 to 4 times. Hamstring wall stretch    1. Lie on your back in a doorway, with your good leg through the open door. 2. Slide your affected leg up the wall to straighten your knee. You should feel a gentle stretch down the back of your leg. 1. Do not arch your back. 2. Do not bend either knee. 3. Keep one heel touching the floor and the other heel touching the wall. Do not point your toes. 3. Hold the stretch for at least 1 minute.  Then over time, try to lengthen the time you hold the stretch to as long as 6 minutes. 4. Repeat 2 to 4 times. 5. If you do not have a place to do this exercise in a doorway, there is another way to do it:  6. Lie on your back, and bend your affected leg. 7. Loop a towel under the ball and toes of that foot, and hold the ends of the towel in your hands. 8. Straighten your knee, and slowly pull back on the towel. You should feel a gentle stretch down the back of your leg. 9. Hold the stretch for at least 15 to 30 seconds. Or even better, hold the stretch for 1 minute if you can. 10. Repeat 2 to 4 times. Quad sets    1. Sit with your affected leg straight and supported on the floor or a firm bed. Place a small, rolled-up towel under your affected knee. Your other leg should be bent, with that foot flat on the floor. 2. Tighten the thigh muscles of your affected leg by pressing the back of your knee down into the towel. 3. Hold for about 6 seconds, then rest for up to 10 seconds. 4. Repeat 8 to 12 times. Straight-leg raises to the front    1. Lie on your back with your good knee bent so that your foot rests flat on the floor. Your affected leg should be straight. Make sure that your low back has a normal curve. You should be able to slip your hand in between the floor and the small of your back, with your palm touching the floor and your back touching the back of your hand. 2. Tighten the thigh muscles in your affected leg by pressing the back of your knee flat down to the floor. Hold your knee straight. 3. Keeping the thigh muscles tight and your leg straight, lift your affected leg up so that your heel is about 12 inches off the floor. 4. Hold for about 6 seconds, then lower your leg slowly. Rest for up to 10 seconds between repetitions. 5. Repeat 8 to 12 times. Straight-leg raises to the back    1. Lie on your stomach, and lift your leg straight up behind you (toward the ceiling).   2. Lift your toes about 6 inches off the floor, hold for about 6 seconds, then lower slowly. 3. Do 8 to 12 repetitions. Wall slide with ball squeeze    1. Stand with your back against a wall and with your feet about shoulder-width apart. Your feet should be about 12 inches away from the wall. 2. Put a ball about the size of a soccer ball between your knees. Then slowly slide down the wall until your knees are bent about 20 to 30 degrees. 3. Tighten your thigh muscles by squeezing the ball between your knees. Hold that position for about 10 seconds, then stop squeezing. Rest for up to 10 seconds between repetitions. 4. Repeat 8 to 12 times. Follow-up care is a key part of your treatment and safety. Be sure to make and go to all appointments, and call your doctor if you are having problems. It's also a good idea to know your test results and keep a list of the medicines you take. Where can you learn more? Go to http://carli-aidan.info/. Enter A404 in the search box to learn more about \"Patellofemoral Pain Syndrome (Runner's Knee): Exercises. \"  Current as of: September 20, 2018  Content Version: 11.9  © 5360-4855 [employer]. Care instructions adapted under license by [employer] (which disclaims liability or warranty for this information). If you have questions about a medical condition or this instruction, always ask your healthcare professional. [place] [address] any warranty or liability for your use of this information.

## 2019-04-10 NOTE — PROGRESS NOTES
Chief Complaint   Patient presents with   [facility] Follow Up     4/3/19 Anemia     1. Have you been to the ER, urgent care clinic since your last visit? ED [facility] 4/3/19  Hospitalized since your last visit? ED [facility] 4/3/19    2. Have you seen or consulted any other health care providers outside of the [address] [place] [address] since your last visit?   No

## 2019-04-10 NOTE — PROGRESS NOTES
CECILIA Obando is a 41 y.o. male who presents to follow-up after hospital stay for anemia due to supratherapeutic INR and gastritis. INR was 6.0 after 4 days off of Coumadin. Hemoglobin was less than 6 prompting referral to the emergency room. He had 3 units of blood. Iron infusion. He had an EGD colonoscopy and pill study. EGD showed gastritis. Colonoscopy was normal.  Do not see the results of the pill study    Discharged on Protonix  Took it upon himself to take an iron supplement    He has had some reflux symptoms that seem to get a little better with food. Had some nausea prior to admission that seems to have improved with the Protonix. Had early satiety (3 bites and then stop) prior to admission and is now eating all meals    Has much more energy    Was on Coumadin for history of dural venous thrombosis. Was to only take this for 1 year but continued for 1.5 years and did not comply with Coumadin checks    PMHx:  Past Medical History:   Diagnosis Date    Anemia     4/3/19    Bleeding hemorrhoid     Elevated cholesterol     Hypertension     Pancreatitis        Meds:   Current Outpatient Medications   Medication Sig Dispense Refill    ferrous sulfate (IRON) 325 mg (65 mg iron) tablet Take  by mouth Daily (before breakfast).  pantoprazole (PROTONIX) 40 mg tablet TAKE 1 TABLET BY MOUTH DAILY FOR STOMACH ACID 90 Tab 3    ondansetron hcl (ZOFRAN) 4 mg tablet TAKE 1 TABLET BY MOUTH EVERY EIGHT (8) HOURS AS NEEDED FOR NAUSEA. 30 Tab 0    cetirizine (ZYRTEC) 10 mg tablet Take 10 mg by mouth daily. Allergies:    Allergies   Allergen Reactions    Peanut Rash     Face swelling, scratchy throat, rash       Smoker:  Social History     Tobacco Use   Smoking Status Current Every Day Smoker    Packs/day: 0.25   Smokeless Tobacco Current User       ETOH:   Social History     Substance and Sexual Activity   Alcohol Use Yes    Comment: one beer per day       FH:   Family History   Problem Relation Age of Onset    Hypertension Mother     Breast Cancer Mother     Hypertension Father     Heart Attack Father     No Known Problems Sister        ROS:   As listed in HPI. In addition:  Constitutional:   No headache, fever, fatigue, weight loss or weight gain      Cardiac:    No chest pain      Resp:   No cough or shortness of breath      Neuro   No loss of consciousness, dizziness, seizures      Physical Exam:  Blood pressure 131/82, pulse 85, temperature 98.4 °F (36.9 °C), temperature source Oral, resp. rate 18, height 5' 10\" (1.778 m), weight 153 lb 9.6 oz (69.7 kg), SpO2 96 %. GEN: No apparent distress. Alert and oriented and responds to all questions appropriately. NEUROLOGIC:  No focal neurologic deficits. Strength and sensation grossly intact. Coordination and gait grossly intact. EXT: Well perfused. No edema. SKIN: No obvious rashes. Assessment and Plan     Blood loss anemia, GI source  EGD showed nonerosive gastritis  Pill study  Supratherapeutic INR. No longer taking Coumadin and no need to restart    Found to be profoundly iron deficient, received 3 units packed red blood cells and iron infusion    Check a CBC and iron studies today. I think iron supplement every other day for 3 months would be wise     gastritis, reflux  Patient benefiting for Protonix. Explained the rationale behind this medication and its importance. He should continue this at least 3 months but will ask GI about duration  Should avoid NSAIDs    He was taking Aleve once or twice per day in response to aches and pains. His low back bothers him. There is some myofascial points at the insertion of the erector spinae on the left. He has bilateral patellofemoral pain which would be amenable to physical therapy. Referred    EtOH use, elevated AST.   Did not discuss EtOH use today    History of hypertriglyceridemia contributing to pancreatitis, check lipid panel    Follow-up 1 month for CBC        ICD-10-CM ICD-9-CM    1. Gastroesophageal reflux disease without esophagitis K21.9 530.81    2. Abnormal LFTs R94.5 790.6    3. Iron deficiency anemia, unspecified iron deficiency anemia type D50.9 280.9 CBC WITH AUTOMATED DIFF      IRON PROFILE      FERRITIN   4. Hypertriglyceridemia E78.1 272.1 LIPID PANEL       AVS given.  Pt expressed understanding of instructions

## 2019-04-11 LAB
BASOPHILS # BLD AUTO: 0 X10E3/UL (ref 0–0.2)
BASOPHILS NFR BLD AUTO: 1 %
CHOLEST SERPL-MCNC: 191 MG/DL (ref 100–199)
EOSINOPHIL # BLD AUTO: 0.1 X10E3/UL (ref 0–0.4)
EOSINOPHIL NFR BLD AUTO: 1 %
ERYTHROCYTE [DISTWIDTH] IN BLOOD BY AUTOMATED COUNT: 29.8 % (ref 12.3–15.4)
FERRITIN SERPL-MCNC: 41 NG/ML (ref 30–400)
HCT VFR BLD AUTO: 31.7 % (ref 37.5–51)
HDLC SERPL-MCNC: 62 MG/DL
HGB BLD-MCNC: 8.9 G/DL (ref 13–17.7)
IMM GRANULOCYTES # BLD AUTO: 0 X10E3/UL (ref 0–0.1)
IMM GRANULOCYTES NFR BLD AUTO: 0 %
INTERPRETATION, 910389: NORMAL
IRON SATN MFR SERPL: 23 % (ref 15–55)
IRON SERPL-MCNC: 91 UG/DL (ref 38–169)
LDLC SERPL CALC-MCNC: 105 MG/DL (ref 0–99)
LYMPHOCYTES # BLD AUTO: 1.3 X10E3/UL (ref 0.7–3.1)
LYMPHOCYTES NFR BLD AUTO: 25 %
MCH RBC QN AUTO: 22.1 PG (ref 26.6–33)
MCHC RBC AUTO-ENTMCNC: 28.1 G/DL (ref 31.5–35.7)
MCV RBC AUTO: 79 FL (ref 79–97)
MONOCYTES # BLD AUTO: 0.5 X10E3/UL (ref 0.1–0.9)
MONOCYTES NFR BLD AUTO: 10 %
MORPHOLOGY BLD-IMP: ABNORMAL
NEUTROPHILS # BLD AUTO: 3.2 X10E3/UL (ref 1.4–7)
NEUTROPHILS NFR BLD AUTO: 63 %
PLATELET # BLD AUTO: 442 X10E3/UL (ref 150–379)
RBC # BLD AUTO: 4.02 X10E6/UL (ref 4.14–5.8)
TIBC SERPL-MCNC: 404 UG/DL (ref 250–450)
TRIGL SERPL-MCNC: 122 MG/DL (ref 0–149)
UIBC SERPL-MCNC: 313 UG/DL (ref 111–343)
VLDLC SERPL CALC-MCNC: 24 MG/DL (ref 5–40)
WBC # BLD AUTO: 5.1 X10E3/UL (ref 3.4–10.8)

## 2019-04-29 DIAGNOSIS — R11.0 NAUSEA: ICD-10-CM

## 2019-04-29 RX ORDER — ONDANSETRON 4 MG/1
TABLET, FILM COATED ORAL
Qty: 30 TAB | Refills: 0 | Status: SHIPPED | OUTPATIENT
Start: 2019-04-29 | End: 2019-06-03 | Stop reason: SDUPTHER

## 2019-06-03 DIAGNOSIS — R11.0 NAUSEA: ICD-10-CM

## 2019-06-03 RX ORDER — ONDANSETRON 4 MG/1
TABLET, FILM COATED ORAL
Qty: 30 TAB | Refills: 0 | Status: SHIPPED | OUTPATIENT
Start: 2019-06-03 | End: 2019-07-03 | Stop reason: SDUPTHER

## 2019-07-03 DIAGNOSIS — R11.0 NAUSEA: ICD-10-CM

## 2019-07-08 RX ORDER — ONDANSETRON 4 MG/1
TABLET, FILM COATED ORAL
Qty: 30 TAB | Refills: 0 | Status: SHIPPED | OUTPATIENT
Start: 2019-07-08 | End: 2021-08-23

## 2019-07-09 ENCOUNTER — APPOINTMENT (OUTPATIENT)
Dept: CT IMAGING | Age: 39
DRG: 439 | End: 2019-07-09
Attending: EMERGENCY MEDICINE
Payer: COMMERCIAL

## 2019-07-09 ENCOUNTER — HOSPITAL ENCOUNTER (INPATIENT)
Age: 39
LOS: 2 days | Discharge: HOME | DRG: 439 | End: 2019-07-11
Attending: EMERGENCY MEDICINE | Admitting: HOSPITALIST
Payer: COMMERCIAL

## 2019-07-09 DIAGNOSIS — K85.90 ACUTE PANCREATITIS, UNSPECIFIED COMPLICATION STATUS, UNSPECIFIED PANCREATITIS TYPE: Primary | ICD-10-CM

## 2019-07-09 LAB
ALBUMIN SERPL-MCNC: 3.6 G/DL (ref 3.5–5)
ALBUMIN/GLOB SERPL: 1.1 {RATIO} (ref 1.1–2.2)
ALP SERPL-CCNC: 91 U/L (ref 45–117)
ALT SERPL-CCNC: 292 U/L (ref 12–78)
AMPHET UR QL SCN: NEGATIVE
ANION GAP SERPL CALC-SCNC: 6 MMOL/L (ref 5–15)
APPEARANCE UR: CLEAR
AST SERPL-CCNC: 622 U/L (ref 15–37)
BACTERIA URNS QL MICRO: NEGATIVE /HPF
BARBITURATES UR QL SCN: NEGATIVE
BASOPHILS # BLD: 0 K/UL (ref 0–0.1)
BASOPHILS NFR BLD: 0 % (ref 0–1)
BENZODIAZ UR QL: NEGATIVE
BILIRUB SERPL-MCNC: 1.2 MG/DL (ref 0.2–1)
BILIRUB UR QL: NEGATIVE
BUN SERPL-MCNC: 8 MG/DL (ref 6–20)
BUN/CREAT SERPL: 7 (ref 12–20)
CALCIUM SERPL-MCNC: 8.6 MG/DL (ref 8.5–10.1)
CANNABINOIDS UR QL SCN: POSITIVE
CHLORIDE SERPL-SCNC: 100 MMOL/L (ref 97–108)
CO2 SERPL-SCNC: 28 MMOL/L (ref 21–32)
COCAINE UR QL SCN: NEGATIVE
COLOR UR: NORMAL
CREAT SERPL-MCNC: 1.07 MG/DL (ref 0.7–1.3)
DIFFERENTIAL METHOD BLD: ABNORMAL
DRUG SCRN COMMENT,DRGCM: ABNORMAL
EOSINOPHIL # BLD: 0 K/UL (ref 0–0.4)
EOSINOPHIL NFR BLD: 0 % (ref 0–7)
EPITH CASTS URNS QL MICRO: NORMAL /LPF
ERYTHROCYTE [DISTWIDTH] IN BLOOD BY AUTOMATED COUNT: 20.4 % (ref 11.5–14.5)
GLOBULIN SER CALC-MCNC: 3.3 G/DL (ref 2–4)
GLUCOSE SERPL-MCNC: 115 MG/DL (ref 65–100)
GLUCOSE UR STRIP.AUTO-MCNC: NEGATIVE MG/DL
HCT VFR BLD AUTO: 36.5 % (ref 36.6–50.3)
HGB BLD-MCNC: 13.2 G/DL (ref 12.1–17)
HGB UR QL STRIP: NEGATIVE
IMM GRANULOCYTES # BLD AUTO: 0 K/UL (ref 0–0.04)
IMM GRANULOCYTES NFR BLD AUTO: 0 % (ref 0–0.5)
KETONES UR QL STRIP.AUTO: NEGATIVE MG/DL
LACTATE SERPL-SCNC: 0.8 MMOL/L (ref 0.4–2)
LEUKOCYTE ESTERASE UR QL STRIP.AUTO: NEGATIVE
LIPASE SERPL-CCNC: 724 U/L (ref 73–393)
LYMPHOCYTES # BLD: 0.9 K/UL (ref 0.8–3.5)
LYMPHOCYTES NFR BLD: 15 % (ref 12–49)
MCH RBC QN AUTO: 27.8 PG (ref 26–34)
MCHC RBC AUTO-ENTMCNC: 36.2 G/DL (ref 30–36.5)
MCV RBC AUTO: 76.8 FL (ref 80–99)
METHADONE UR QL: NEGATIVE
MONOCYTES # BLD: 0.4 K/UL (ref 0–1)
MONOCYTES NFR BLD: 7 % (ref 5–13)
NEUTS SEG # BLD: 5 K/UL (ref 1.8–8)
NEUTS SEG NFR BLD: 78 % (ref 32–75)
NITRITE UR QL STRIP.AUTO: NEGATIVE
NRBC # BLD: 0 K/UL (ref 0–0.01)
NRBC BLD-RTO: 0 PER 100 WBC
OPIATES UR QL: NEGATIVE
PCP UR QL: NEGATIVE
PH UR STRIP: 6 [PH] (ref 5–8)
PLATELET # BLD AUTO: 164 K/UL (ref 150–400)
PMV BLD AUTO: 9.3 FL (ref 8.9–12.9)
POTASSIUM SERPL-SCNC: 3.7 MMOL/L (ref 3.5–5.1)
PROT SERPL-MCNC: 6.9 G/DL (ref 6.4–8.2)
PROT UR STRIP-MCNC: NEGATIVE MG/DL
RBC # BLD AUTO: 4.75 M/UL (ref 4.1–5.7)
RBC #/AREA URNS HPF: NORMAL /HPF (ref 0–5)
SODIUM SERPL-SCNC: 134 MMOL/L (ref 136–145)
SP GR UR REFRACTOMETRY: 1.01 (ref 1–1.03)
UA: UC IF INDICATED,UAUC: NORMAL
UROBILINOGEN UR QL STRIP.AUTO: 0.2 EU/DL (ref 0.2–1)
WBC # BLD AUTO: 6.4 K/UL (ref 4.1–11.1)
WBC URNS QL MICRO: NORMAL /HPF (ref 0–4)

## 2019-07-09 PROCEDURE — 83605 ASSAY OF LACTIC ACID: CPT

## 2019-07-09 PROCEDURE — 74011250637 HC RX REV CODE- 250/637: Performed by: HOSPITALIST

## 2019-07-09 PROCEDURE — 65270000015 HC RM PRIVATE ONCOLOGY

## 2019-07-09 PROCEDURE — 96375 TX/PRO/DX INJ NEW DRUG ADDON: CPT

## 2019-07-09 PROCEDURE — 80307 DRUG TEST PRSMV CHEM ANLYZR: CPT

## 2019-07-09 PROCEDURE — 74011000250 HC RX REV CODE- 250: Performed by: HOSPITALIST

## 2019-07-09 PROCEDURE — 80053 COMPREHEN METABOLIC PANEL: CPT

## 2019-07-09 PROCEDURE — 94761 N-INVAS EAR/PLS OXIMETRY MLT: CPT

## 2019-07-09 PROCEDURE — 74177 CT ABD & PELVIS W/CONTRAST: CPT

## 2019-07-09 PROCEDURE — 83690 ASSAY OF LIPASE: CPT

## 2019-07-09 PROCEDURE — 74011250636 HC RX REV CODE- 250/636: Performed by: EMERGENCY MEDICINE

## 2019-07-09 PROCEDURE — 74011636320 HC RX REV CODE- 636/320: Performed by: EMERGENCY MEDICINE

## 2019-07-09 PROCEDURE — 99284 EMERGENCY DEPT VISIT MOD MDM: CPT

## 2019-07-09 PROCEDURE — 85025 COMPLETE CBC W/AUTO DIFF WBC: CPT

## 2019-07-09 PROCEDURE — 36415 COLL VENOUS BLD VENIPUNCTURE: CPT

## 2019-07-09 PROCEDURE — 96374 THER/PROPH/DIAG INJ IV PUSH: CPT

## 2019-07-09 PROCEDURE — 96361 HYDRATE IV INFUSION ADD-ON: CPT

## 2019-07-09 PROCEDURE — 81001 URINALYSIS AUTO W/SCOPE: CPT

## 2019-07-09 PROCEDURE — 74011250636 HC RX REV CODE- 250/636: Performed by: HOSPITALIST

## 2019-07-09 RX ORDER — SODIUM CHLORIDE 0.9 % (FLUSH) 0.9 %
5-40 SYRINGE (ML) INJECTION EVERY 8 HOURS
Status: DISCONTINUED | OUTPATIENT
Start: 2019-07-09 | End: 2019-07-11 | Stop reason: HOSPADM

## 2019-07-09 RX ORDER — SODIUM CHLORIDE 0.9 % (FLUSH) 0.9 %
5-40 SYRINGE (ML) INJECTION EVERY 8 HOURS
Status: DISCONTINUED | OUTPATIENT
Start: 2019-07-09 | End: 2019-07-10

## 2019-07-09 RX ORDER — LORAZEPAM 1 MG/1
2 TABLET ORAL
Status: DISCONTINUED | OUTPATIENT
Start: 2019-07-09 | End: 2019-07-11 | Stop reason: HOSPADM

## 2019-07-09 RX ORDER — SODIUM CHLORIDE 0.9 % (FLUSH) 0.9 %
10 SYRINGE (ML) INJECTION
Status: COMPLETED | OUTPATIENT
Start: 2019-07-09 | End: 2019-07-09

## 2019-07-09 RX ORDER — SODIUM CHLORIDE 0.9 % (FLUSH) 0.9 %
5-40 SYRINGE (ML) INJECTION AS NEEDED
Status: DISCONTINUED | OUTPATIENT
Start: 2019-07-09 | End: 2019-07-10

## 2019-07-09 RX ORDER — ONDANSETRON 2 MG/ML
4 INJECTION INTRAMUSCULAR; INTRAVENOUS
Status: COMPLETED | OUTPATIENT
Start: 2019-07-09 | End: 2019-07-09

## 2019-07-09 RX ORDER — LANOLIN ALCOHOL/MO/W.PET/CERES
3 CREAM (GRAM) TOPICAL
Status: DISCONTINUED | OUTPATIENT
Start: 2019-07-09 | End: 2019-07-11 | Stop reason: HOSPADM

## 2019-07-09 RX ORDER — MORPHINE SULFATE 2 MG/ML
2 INJECTION, SOLUTION INTRAMUSCULAR; INTRAVENOUS
Status: DISCONTINUED | OUTPATIENT
Start: 2019-07-09 | End: 2019-07-11 | Stop reason: HOSPADM

## 2019-07-09 RX ORDER — SODIUM CHLORIDE 0.9 % (FLUSH) 0.9 %
5-40 SYRINGE (ML) INJECTION AS NEEDED
Status: DISCONTINUED | OUTPATIENT
Start: 2019-07-09 | End: 2019-07-11 | Stop reason: HOSPADM

## 2019-07-09 RX ORDER — OXYCODONE HYDROCHLORIDE 5 MG/1
5 TABLET ORAL
Status: DISCONTINUED | OUTPATIENT
Start: 2019-07-09 | End: 2019-07-11 | Stop reason: HOSPADM

## 2019-07-09 RX ORDER — LORAZEPAM 1 MG/1
4 TABLET ORAL
Status: DISCONTINUED | OUTPATIENT
Start: 2019-07-09 | End: 2019-07-11 | Stop reason: HOSPADM

## 2019-07-09 RX ORDER — FAMOTIDINE 10 MG/ML
20 INJECTION INTRAVENOUS
Status: COMPLETED | OUTPATIENT
Start: 2019-07-09 | End: 2019-07-09

## 2019-07-09 RX ORDER — SODIUM CHLORIDE, SODIUM LACTATE, POTASSIUM CHLORIDE, CALCIUM CHLORIDE 600; 310; 30; 20 MG/100ML; MG/100ML; MG/100ML; MG/100ML
150 INJECTION, SOLUTION INTRAVENOUS CONTINUOUS
Status: DISCONTINUED | OUTPATIENT
Start: 2019-07-09 | End: 2019-07-11 | Stop reason: HOSPADM

## 2019-07-09 RX ORDER — ONDANSETRON 2 MG/ML
4 INJECTION INTRAMUSCULAR; INTRAVENOUS
Status: DISCONTINUED | OUTPATIENT
Start: 2019-07-09 | End: 2019-07-11 | Stop reason: HOSPADM

## 2019-07-09 RX ORDER — AMOXICILLIN 250 MG
1 CAPSULE ORAL DAILY
Status: DISCONTINUED | OUTPATIENT
Start: 2019-07-09 | End: 2019-07-11 | Stop reason: HOSPADM

## 2019-07-09 RX ORDER — ENOXAPARIN SODIUM 100 MG/ML
40 INJECTION SUBCUTANEOUS DAILY
Status: DISCONTINUED | OUTPATIENT
Start: 2019-07-09 | End: 2019-07-11 | Stop reason: HOSPADM

## 2019-07-09 RX ADMIN — MELATONIN 3 MG: at 21:55

## 2019-07-09 RX ADMIN — ENOXAPARIN SODIUM 40 MG: 40 INJECTION SUBCUTANEOUS at 11:29

## 2019-07-09 RX ADMIN — FAMOTIDINE 20 MG: 10 INJECTION, SOLUTION INTRAVENOUS at 02:53

## 2019-07-09 RX ADMIN — Medication 10 ML: at 21:55

## 2019-07-09 RX ADMIN — MORPHINE SULFATE 2 MG: 2 INJECTION, SOLUTION INTRAMUSCULAR; INTRAVENOUS at 16:44

## 2019-07-09 RX ADMIN — OXYCODONE HYDROCHLORIDE 5 MG: 5 TABLET ORAL at 05:59

## 2019-07-09 RX ADMIN — Medication 10 ML: at 04:23

## 2019-07-09 RX ADMIN — Medication 10 ML: at 20:11

## 2019-07-09 RX ADMIN — IOPAMIDOL 100 ML: 755 INJECTION, SOLUTION INTRAVENOUS at 04:23

## 2019-07-09 RX ADMIN — FOLIC ACID: 5 INJECTION, SOLUTION INTRAMUSCULAR; INTRAVENOUS; SUBCUTANEOUS at 11:32

## 2019-07-09 RX ADMIN — Medication 10 ML: at 16:44

## 2019-07-09 RX ADMIN — ONDANSETRON 4 MG: 2 INJECTION INTRAMUSCULAR; INTRAVENOUS at 06:01

## 2019-07-09 RX ADMIN — SODIUM CHLORIDE, SODIUM LACTATE, POTASSIUM CHLORIDE, AND CALCIUM CHLORIDE 150 ML/HR: 600; 310; 30; 20 INJECTION, SOLUTION INTRAVENOUS at 20:11

## 2019-07-09 RX ADMIN — OXYCODONE HYDROCHLORIDE 5 MG: 5 TABLET ORAL at 11:29

## 2019-07-09 RX ADMIN — SENNOSIDES,DOCUSATE SODIUM 1 TABLET: 8.6; 5 TABLET, FILM COATED ORAL at 11:29

## 2019-07-09 RX ADMIN — SODIUM CHLORIDE 1000 ML: 900 INJECTION, SOLUTION INTRAVENOUS at 05:54

## 2019-07-09 RX ADMIN — SODIUM CHLORIDE 1000 ML: 900 INJECTION, SOLUTION INTRAVENOUS at 02:53

## 2019-07-09 RX ADMIN — ONDANSETRON 4 MG: 2 INJECTION INTRAMUSCULAR; INTRAVENOUS at 02:53

## 2019-07-09 NOTE — PROGRESS NOTES
Pharmacy Clarification of the Prior to Admission Medication Regimen Retrospective to the Admission Medication Reconciliation    The patient was interviewed regarding clarification of the prior to admission medication regimen and stated he did not know the names of his medications. Patient called his wife, Jermaine Oliveira, who was able to verfiy the patient's medications and strengths. Patient was questioned regarding use of any other inhalers, topical products, over the counter medications, herbal medications, vitamin products or ophthalmic/nasal/otic medication use. Information Obtained From: Patient, patient's wife, RX Query    Recommendations/Findings: The following amendments were made to the patient's active medication list on file at [facility]:     1) Additions: NONE    2) Removals: NONE    3) Changes:  ferrous sulfate (IRON) 325 mg (65 mg iron) tablet (Old regimen: daily /New regimen: 325 mg every other day)    4) Pertinent Pharmacy Findings: NONE     PTA medication list was corrected to the following:     Prior to Admission Medications   Prescriptions Last Dose Informant Patient Reported? Taking? cetirizine (ZYRTEC) 10 mg tablet 7/8/2019 at Unknown time Significant Other Yes Yes   Sig: Take 10 mg by mouth daily. ferrous sulfate (IRON) 325 mg (65 mg iron) tablet 7/7/2019 at Unknown time Self Yes Yes   Sig: Take 325 mg by mouth every fourty-eight (48) hours.    ondansetron hcl (ZOFRAN) 4 mg tablet 7/8/2019 at Unknown time Significant Other No Yes   Sig: TAKE 1 TABLET BY MOUTH EVERY EIGHT (8) HOURS AS NEEDED FOR NAUSEA.   pantoprazole (PROTONIX) 40 mg tablet 7/8/2019 at Unknown time Significant Other No Yes   Sig: TAKE 1 TABLET BY MOUTH DAILY FOR STOMACH ACID      Facility-Administered Medications: None          Thank you,  Linda Calzada [facility]  Medication History Pharmacy Technician

## 2019-07-09 NOTE — ROUTINE PROCESS
TRANSFER - OUT REPORT: 
 
Verbal report given to Mejia Alonso RN (name) on Sierra Ambrosio  being transferred to Oncology (unit) for routine progression of care Report consisted of patients Situation, Background, Assessment and  
Recommendations(SBAR). Information from the following report(s) SBAR, Kardex, [facility] and Recent Results was reviewed with the receiving nurse. Lines:  
Peripheral IV 07/09/19 Right Wrist (Active) Site Assessment Clean, dry, & intact 7/9/2019 11:07 AM  
Phlebitis Assessment 0 7/9/2019 11:07 AM  
Infiltration Assessment 0 7/9/2019 11:07 AM  
Dressing Status Clean, dry, & intact 7/9/2019 11:07 AM  
Dressing Type Tape;Transparent 7/9/2019 11:07 AM  
Hub Color/Line Status Pink; Infusing 7/9/2019 11:07 AM  
  
 
Opportunity for questions and clarification was provided.

## 2019-07-09 NOTE — ED PROVIDER NOTES
EMERGENCY DEPARTMENT HISTORY AND PHYSICAL EXAM      Date: 7/9/2019  Patient Name: Rosalva Peralta    History of Presenting Illness     Chief Complaint   Patient presents with    Vomiting    Abdominal Pain       History Provided By: Patient    HPI: Rosalva Peralta, 41 y.o. male with PMHx significant for pancreatitis, gastritis, dural sinus thrombosis presents to the emergency room with chief complaint of epigastric pain that started yesterday. Patient states that he drank alcohol few days ago and then yesterday developed pain just above his umbilicus. He reports nausea and vomiting several times over the past week, but has not had any vomiting today. He denies diarrhea, black or bloody stools. Urinary symptoms. He does report using marijuana daily. PCP: Yinka Narayanan MD    No current facility-administered medications on file prior to encounter. Current Outpatient Medications on File Prior to Encounter   Medication Sig Dispense Refill    ondansetron hcl (ZOFRAN) 4 mg tablet TAKE 1 TABLET BY MOUTH EVERY EIGHT (8) HOURS AS NEEDED FOR NAUSEA. 30 Tab 0    ferrous sulfate (IRON) 325 mg (65 mg iron) tablet Take  by mouth Daily (before breakfast).  cetirizine (ZYRTEC) 10 mg tablet Take 10 mg by mouth daily.       pantoprazole (PROTONIX) 40 mg tablet TAKE 1 TABLET BY MOUTH DAILY FOR STOMACH ACID 90 Tab 3       Past History     Past Medical History:  Past Medical History:   Diagnosis Date    Anemia     4/3/19    Bleeding hemorrhoid     Elevated cholesterol     Hypertension     Pancreatitis        Past Surgical History:  Past Surgical History:   Procedure Laterality Date    COLONOSCOPY N/A 4/4/2019    COL[facility] Elvia [facility]erm[facility]z [facility] END[facility]COPY    HX GI      HX HEMORRHOIDECTOMY      2013       Family History:  Family History   Problem Relation Age of Onset    Hypertension Mother     Breast Cancer Mother     Hypertension Father     Heart Attack Father     No Known Problems Sister        Social History:  Social History     Tobacco Use    Smoking status: Current Every Day Smoker     Packs/day: 0.25    Smokeless tobacco: Current User   Substance Use Topics    Alcohol use: Yes     Comment: one beer per day    Drug use: Yes     Types: Marijuana       Allergies: Allergies   Allergen Reactions    Peanut Rash     Face swelling, scratchy throat, rash         Review of Systems   Review of Systems   Constitutional: Negative for chills and fever. HENT: Negative for congestion, facial swelling, rhinorrhea and sore throat. Eyes: Negative. Respiratory: Negative for cough, chest tightness and shortness of breath. Cardiovascular: Negative for chest pain, palpitations and leg swelling. Gastrointestinal: Positive for abdominal pain, nausea and vomiting. Negative for blood in stool, constipation and diarrhea. Genitourinary: Negative for dysuria, flank pain and hematuria. Musculoskeletal: Negative for back pain, myalgias and neck pain. Skin: Negative for rash and wound. Neurological: Negative for syncope, weakness, light-headedness and headaches. Psychiatric/Behavioral: Negative for confusion. The patient is not nervous/anxious.           Physical Exam    General appearance -thin, well appearing, and in no distress  Eyes - pupils equal and reactive, extraocular eye movements intact  ENT - mucous membranes moist, pharynx normal without lesions  Neck - supple, no significant adenopathy; non-tender to palpation  Chest - clear to auscultation, no wheezes, rales or rhonchi; non-tender to palpation  Heart - normal rate and regular rhythm, S1 and S2 normal, no murmurs noted  Abdomen - soft, tender to palpation epigastric area, no rebound or guarding nondistended, no masses or organomegaly  Musculoskeletal - no joint tenderness, deformity or swelling; normal ROM  Extremities - peripheral pulses normal, no pedal edema  Skin - normal coloration and turgor, no rashes  Neurological - alert, oriented x3, normal speech, no focal findings or movement disorder noted    Diagnostic Study Results     Labs -     Recent Results (from the past 12 hour(s))   CBC WITH AUTOMATED DIFF    Collection Time: 07/09/19 12:41 AM   Result Value Ref Range    WBC 6.4 4.1 - 11.1 K/uL    RBC 4.75 4.10 - 5.70 M/uL    HGB 13.2 12.1 - 17.0 g/dL    HCT 36.5 (L) 36.6 - 50.3 %    MCV 76.8 (L) 80.0 - 99.0 FL    MCH 27.8 26.0 - 34.0 PG    MCHC 36.2 30.0 - 36.5 g/dL    RDW 20.4 (H) 11.5 - 14.5 %    PLATELET 357 857 - 127 K/uL    MPV 9.3 8.9 - 12.9 FL    NRBC 0.0 0  WBC    ABSOLUTE NRBC 0.00 0.00 - 0.01 K/uL    NEUTROPHILS 78 (H) 32 - 75 %    LYMPHOCYTES 15 12 - 49 %    MONOCYTES 7 5 - 13 %    EOSINOPHILS 0 0 - 7 %    BASOPHILS 0 0 - 1 %    IMMATURE GRANULOCYTES 0 0.0 - 0.5 %    ABS. NEUTROPHILS 5.0 1.8 - 8.0 K/UL    ABS. LYMPHOCYTES 0.9 0.8 - 3.5 K/UL    ABS. MONOCYTES 0.4 0.0 - 1.0 K/UL    ABS. EOSINOPHILS 0.0 0.0 - 0.4 K/UL    ABS. BASOPHILS 0.0 0.0 - 0.1 K/UL    ABS. IMM. GRANS. 0.0 0.00 - 0.04 K/UL    DF AUTOMATED     METABOLIC PANEL, COMPREHENSIVE    Collection Time: 07/09/19 12:41 AM   Result Value Ref Range    Sodium 134 (L) 136 - 145 mmol/L    Potassium 3.7 3.5 - 5.1 mmol/L    Chloride 100 97 - 108 mmol/L    CO2 28 21 - 32 mmol/L    Anion gap 6 5 - 15 mmol/L    Glucose 115 (H) 65 - 100 mg/dL    BUN 8 6 - 20 MG/DL    Creatinine 1.07 0.70 - 1.30 MG/DL    BUN/Creatinine ratio 7 (L) 12 - 20      GFR est AA >60 >60 ml/min/1.73m2    GFR est non-AA >60 >60 ml/min/1.73m2    Calcium 8.6 8.5 - 10.1 MG/DL    Bilirubin, total 1.2 (H) 0.2 - 1.0 MG/DL    ALT (SGPT) 292 (H) 12 - 78 U/L    AST (SGOT) 622 (H) 15 - 37 U/L    Alk.  phosphatase 91 45 - 117 U/L    Protein, total 6.9 6.4 - 8.2 g/dL    Albumin 3.6 3.5 - 5.0 g/dL    Globulin 3.3 2.0 - 4.0 g/dL    A-G Ratio 1.1 1.1 - 2.2         Radiologic Studies -   No orders to display     CT Results  (Last 48 hours)    None        CXR Results  (Last 48 hours)    None            Medical Decision Making   I am the first provider for this patient. I reviewed the vital signs, available nursing notes, past medical history, past surgical history, family history and social history. Vital Signs-Reviewed the patient's vital signs. Patient Vitals for the past 12 hrs:   Temp Pulse Resp BP SpO2   07/09/19 0115  64 17 (!) 131/96 100 %   07/09/19 0100  68 18 132/90 100 %   07/09/19 0054    (!) 142/91    07/09/19 0050     100 %   07/09/19 0021 98.1 °F (36.7 °C) 74 14 (!) 134/94 100 %           Records Reviewed: Nursing Notes and Old Medical Records    Provider Notes (Medical Decision Making):   Differential diagnosis: Gastritis, pancreatitis, dehydration, electrolyte abnormality, UTI, cannabinoid induced hyperemesis    ED Course:   Initial assessment performed. The patients presenting problems have been discussed, and they are in agreement with the care plan formulated and outlined with them. I have encouraged them to ask questions as they arise throughout their visit. Progress Notes:   Patient feeling better after medications and IV fluids in the emergency room. Lipase is elevated and CT shows acute pancreatitis. will admit    Disposition:  Admit to the hospitalist service      Diagnosis     Clinical Impression:   1.  Acute pancreatitis, unspecified complication status, unspecified pancreatitis type

## 2019-07-09 NOTE — PROGRESS NOTES
Hospitalist Progress Note    NAME: Hernan Vidales   :  1980   MRN:  [id number]       Assessment / Plan:  Acute recurrent alcoholic pancreatitis   -Sx: abdominal pain/vomiting   Lipase 724   -diet: keep NPO  -aggressive IVF  -closely monitor electrolytes   -aggressive IVF with LR   -pain management: pain controlled with current regiment   -CT: Extensive inflammatory stranding around the heterogeneous, enlarged pancreas  most consistent with acute pancreatitis. No drainable fluid collection. Elevated LFT's  -likely due to ETOH   -monitor   -liver/gallbladder - unremarkable on CT     Hyponatremia  -IVF, monitor     Chronic alcoholism with concern for alcohol withdrawal  - Reports drinking 6 pack of beers every day, last drink per pt    No signs of withdrawing at this time   Counseled regarding alcohol cessation  -CIWA   Insomnia  -pt stated he is drinking in order to fall asleep   C/w melatonin   educated on sleep hygiene     H/o dural venous sinus thrombosis, completed 1 year coumadin       Code Status: full   Surrogate Decision Maker: Wife Kelly Gonzalez  DVT Prophylaxis: Lovenox        Baseline: From home independent  Recommended Disposition: Home w/Family pending clinical progress      Subjective:     Chief Complaint / Reason for Physician Visit: following alcoholic pancreatitis/ elevated LFTs   Abdominal pain:controlled with meds  No dry heaving   Tolerates sips of clear       Discussed with RN events overnight. Review of Systems:  Symptom Y/N Comments  Symptom Y/N Comments   Fever/Chills n   Chest Pain     Poor Appetite    Edema     Cough    Abdominal Pain y    Sputum    Joint Pain     SOB/ALEX    Pruritis/Rash     Nausea/vomit y   Tolerating PT/OT     Diarrhea    Tolerating Diet     Constipation    Other       Could NOT obtain due to:      Objective:     VITALS:   Last 24hrs VS reviewed since prior progress note.  Most recent are:  Patient Vitals for the past 24 hrs:   Temp Pulse Resp BP SpO2 07/09/19 0600 98.2 °F (36.8 °C) 62 17 (!) 153/95 99 %   07/09/19 0400  60 16 145/90 98 %   07/09/19 0345  63 20 146/90 98 %   07/09/19 0315  63 16 (!) 148/96 97 %   07/09/19 0145  67 17 125/88 99 %   07/09/19 0115  64 17 (!) 131/96 100 %   07/09/19 0100  68 18 132/90 100 %   07/09/19 0054    (!) 142/91    07/09/19 0050     100 %   07/09/19 0021 98.1 °F (36.7 °C) 74 14 (!) 134/94 100 %       Intake/Output Summary (Last 24 hours) at 7/9/2019 1008  Last data filed at 7/9/2019 0400  Gross per 24 hour   Intake 1000 ml   Output    Net 1000 ml        PHYSICAL EXAM:  General: WD, WN. Alert, cooperative, no acute distress    EENT:  EOMI. Anicteric sclerae. MMM  Resp:  CTA bilaterally, no wheezing or rales. No accessory muscle use  CV:  Regular  rhythm,  No edema  GI:  Soft, Non distended, Non tender.  +Bowel sounds  Neurologic:  Alert and oriented X 3, normal speech,   Psych:   Good insight. Not anxious nor agitated  Skin:  No rashes. No jaundice    Reviewed most current lab test results and cultures  YES  Reviewed most current radiology test results   YES  Review and summation of old records today    NO  Reviewed patient's current orders and MAR    YES  PMH/SH reviewed - no change compared to H&P  ________________________________________________________________________  Care Plan discussed with:    Comments   Patient y    Family      RN y    Care Manager     Consultant                        Multidiciplinary team rounds were held today with , nursing, pharmacist and clinical coordinator. Patient's plan of care was discussed; medications were reviewed and discharge planning was addressed.      ________________________________________________________________________  Total NON critical care TIME:  35  Minutes    Total CRITICAL CARE TIME Spent:   Minutes non procedure based      Comments   >50% of visit spent in counseling and coordination of care ________________________________________________________________________  Jackson Newman MD     Procedures: see electronic medical records for all procedures/Xrays and details which were not copied into this note but were reviewed prior to creation of Plan. LABS:  I reviewed today's most current labs and imaging studies.   Pertinent labs include:  Recent Labs     07/09/19 0041   WBC 6.4   HGB 13.2   HCT 36.5*        Recent Labs     07/09/19 0041   *   K 3.7      CO2 28   *   BUN 8   CREA 1.07   CA 8.6   ALB 3.6   TBILI 1.2*   SGOT 622*   *       Signed: Jackson Newman MD

## 2019-07-09 NOTE — PROGRESS NOTES
Oncology End of Shift Note      Bedside shift change report given to RYNE anna (incoming nurse) by Mily Goncalves RN (outgoing nurse) on Retia Larve. Report included the following information SBAR.       Shift Summary: IV, pain control      Issues for Physician to Address                      Mily Goncalves RN

## 2019-07-09 NOTE — ED NOTES
TRANSFER - OUT REPORT:    Verbal report given to [facility]-ER (name) on Viv Chaudhry  being transferred to ED (unit) for routine progression of care       Report consisted of patients Situation, Background, Assessment and   Recommendations(SBAR). Information from the following report(s) SBAR was reviewed with the receiving nurse.     Lines:   Peripheral IV 07/09/19 Right Wrist (Active)   Site Assessment Clean, dry, & intact 7/9/2019  2:49 AM   Phlebitis Assessment 0 7/9/2019  2:49 AM   Infiltration Assessment 0 7/9/2019  2:49 AM   Dressing Status Clean, dry, & intact 7/9/2019  2:49 AM   Dressing Type Transparent 7/9/2019  2:49 AM        Opportunity for questions and clarification was provided

## 2019-07-09 NOTE — ED NOTES
Pt presents to ED c/o periumbilical abd pain and intermittent N/V x1.5 weeks. Pt had a small BM yesterday, pt used an enema at home without relief. Pt has a hx of pancreatitis.

## 2019-07-09 NOTE — PROGRESS NOTES
Reason for Admission:   Acute pancreatitis                   RRAT Score:          3 low risk           Plan for utilizing home health:      TBD                    Current Advanced Directive/Advance Care Plan: none                         Transition of Care Plan:             1.  Patient in ED bed waiting for inpatient admission  2. Patient prefers to discharge home with family assistance and follow up appointments. Patient is a 41year old male admitted 7/9 for acute pancreatitis. Patient alert and oriented, resting in bed, no visitors present in room. Demographic information verified and correct. Insurance information verified and correct. Patient lives with his wife, no home oxygen, no DME, has not used home health in the past.  Patient uses [address] [employer]. Patient states he is independent with ADLs and can drive. Pt's wife can transport at discharge. Care Management Interventions  PCP Verified by CM: Moses Tello MD)  Mode of Transport at Discharge:  Other (see comment)(pt's wife can transport at "[employer]  Transition of Care Consult (CM Consult): Discharge Planning  Discharge Durable Medical Equipment: No  Physical Therapy Consult: No  Occupational Therapy Consult: No  Speech Therapy Consult: No  Current Support Network: Lives with Spouse, Own Home(lives with wife, 1 story house, 0 steps to enter)  Confirm Follow Up Transport: Family  Plan discussed with Pt/Family/Caregiver: Yes  Discharge Location  Discharge Placement: [address], RN, [facility]  [phone]

## 2019-07-09 NOTE — H&P
HISTORY AND PHYSICAL      PCP: Blade Hi MD  History source: the patient      CC: abdominal pain      HPI: 41 y.o man w/ alcohol abuse presents with abdominal pain. Symptoms began yesterday with severe epigastric pain, non-radiating, associated with nausea and dry-heaving. He drinks at least a 6 pack of beer daily, last drink was on 7/6/19. He's had acute pancreatitis in the past due to alcohol and this is similar. No bleeding, diarrhea, constipation. PMH/PSH:  Past Medical History:   Diagnosis Date    Anemia     4/3/19    Bleeding hemorrhoid     Elevated cholesterol     Hypertension     Pancreatitis      Past Surgical History:   Procedure Laterality Date    COLONOSCOPY N/A 4/4/2019    COLON[facility] performed by [facility]pson MD at [facility] ENDOSCOPY    HX GI      HX HEMORRHOIDECTOMY      2013       Home meds:   Prior to Admission medications    Medication Sig Start Date End Date Taking? Authorizing Provider   ondansetron hcl (ZOFRAN) 4 mg tablet TAKE 1 TABLET BY MOUTH EVERY EIGHT (8) HOURS AS NEEDED FOR NAUSEA. 7/8/19   Blade Hi MD   ferrous sulfate (IRON) 325 mg (65 mg iron) tablet Take  by mouth Daily (before breakfast). Provider, Ramírez   cetirizine (ZYRTEC) 10 mg tablet Take 10 mg by mouth daily. Other, MD Kim   pantoprazole (PROTONIX) 40 mg tablet TAKE 1 TABLET BY MOUTH DAILY FOR STOMACH ACID 4/1/19   Blade Hi MD       Allergies:   Allergies   Allergen Reactions    Peanut Rash     Face swelling, scratchy throat, rash       FH:  Family History   Problem Relation Age of Onset    Hypertension Mother     Breast Cancer Mother     Hypertension Father     Heart Attack Father     No Known Problems Sister        SH:  Social History     Tobacco Use    Smoking status: Current Every Day Smoker     Packs/day: 0.25    Smokeless tobacco: Current User   Substance Use Topics    Alcohol use: Yes     Comment: one beer per day       ROS: A comprehensive review of systems was negative except for that written in the HPI. PHYSICAL EXAM:  Visit Vitals  BP (!) 131/96   Pulse 64   Temp 98.1 °F (36.7 °C)   Resp 17   Ht 5' 10\" (1.778 m)   Wt 64.4 kg (141 lb 15.6 oz)   SpO2 100%   BMI 20.37 kg/m²       Gen: NAD, non-toxic  HEENT: anicteric sclerae, normal conjunctiva, oropharynx clear, MM dry  Neck: supple, trachea midline, no adenopathy  Heart: RRR, no MRG, no JVD, no peripheral edema  Lungs: CTA b/l, non-labored respirations  Abd: soft, mild epigastric tenderness, ND, BS+  Extr: warm  Skin: dry, no rash  Neuro: CN II-XII grossly intact, normal speech, moves all extremities  Psych: normal mood, flat affect      Labs/Imaging:  Recent Results (from the past 24 hour(s))   CBC WITH AUTOMATED DIFF    Collection Time: 07/09/19 12:41 AM   Result Value Ref Range    WBC 6.4 4.1 - 11.1 K/uL    RBC 4.75 4.10 - 5.70 M/uL    HGB 13.2 12.1 - 17.0 g/dL    HCT 36.5 (L) 36.6 - 50.3 %    MCV 76.8 (L) 80.0 - 99.0 FL    MCH 27.8 26.0 - 34.0 PG    MCHC 36.2 30.0 - 36.5 g/dL    RDW 20.4 (H) 11.5 - 14.5 %    PLATELET 825 452 - 276 K/uL    MPV 9.3 8.9 - 12.9 FL    NRBC 0.0 0  WBC    ABSOLUTE NRBC 0.00 0.00 - 0.01 K/uL    NEUTROPHILS 78 (H) 32 - 75 %    LYMPHOCYTES 15 12 - 49 %    MONOCYTES 7 5 - 13 %    EOSINOPHILS 0 0 - 7 %    BASOPHILS 0 0 - 1 %    IMMATURE GRANULOCYTES 0 0.0 - 0.5 %    ABS. NEUTROPHILS 5.0 1.8 - 8.0 K/UL    ABS. LYMPHOCYTES 0.9 0.8 - 3.5 K/UL    ABS. MONOCYTES 0.4 0.0 - 1.0 K/UL    ABS. EOSINOPHILS 0.0 0.0 - 0.4 K/UL    ABS. BASOPHILS 0.0 0.0 - 0.1 K/UL    ABS. IMM.  GRANS. 0.0 0.00 - 0.04 K/UL    DF AUTOMATED     METABOLIC PANEL, COMPREHENSIVE    Collection Time: 07/09/19 12:41 AM   Result Value Ref Range    Sodium 134 (L) 136 - 145 mmol/L    Potassium 3.7 3.5 - 5.1 mmol/L    Chloride 100 97 - 108 mmol/L    CO2 28 21 - 32 mmol/L    Anion gap 6 5 - 15 mmol/L    Glucose 115 (H) 65 - 100 mg/dL    BUN 8 6 - 20 MG/DL    Creatinine 1.07 0.70 - 1.30 MG/DL    BUN/Creatinine ratio 7 (L) 12 - 20      GFR est AA >60 >60 ml/min/1.73m2    GFR est non-AA >60 >60 ml/min/1.73m2    Calcium 8.6 8.5 - 10.1 MG/DL    Bilirubin, total 1.2 (H) 0.2 - 1.0 MG/DL    ALT (SGPT) 292 (H) 12 - 78 U/L    AST (SGOT) 622 (H) 15 - 37 U/L    Alk.  phosphatase 91 45 - 117 U/L    Protein, total 6.9 6.4 - 8.2 g/dL    Albumin 3.6 3.5 - 5.0 g/dL    Globulin 3.3 2.0 - 4.0 g/dL    A-G Ratio 1.1 1.1 - 2.2     LIPASE    Collection Time: 07/09/19  2:37 AM   Result Value Ref Range    Lipase 724 (H) 73 - 393 U/L   URINALYSIS W/ REFLEX CULTURE    Collection Time: 07/09/19  2:37 AM   Result Value Ref Range    Color YELLOW/STRAW      Appearance CLEAR CLEAR      Specific gravity 1.006 1.003 - 1.030      pH (UA) 6.0 5.0 - 8.0      Protein NEGATIVE  NEG mg/dL    Glucose NEGATIVE  NEG mg/dL    Ketone NEGATIVE  NEG mg/dL    Bilirubin NEGATIVE  NEG      Blood NEGATIVE  NEG      Urobilinogen 0.2 0.2 - 1.0 EU/dL    Nitrites NEGATIVE  NEG      Leukocyte Esterase NEGATIVE  NEG      WBC 0-4 0 - 4 /hpf    RBC 0-5 0 - 5 /hpf    Epithelial cells FEW FEW /lpf    Bacteria NEGATIVE  NEG /hpf    UA:UC IF INDICATED CULTURE NOT INDICATED BY UA RESULT CNI     DRUG SCREEN, URINE    Collection Time: 07/09/19  2:37 AM   Result Value Ref Range    AMPHETAMINES NEGATIVE  NEG      BARBITURATES NEGATIVE  NEG      BENZODIAZEPINES NEGATIVE  NEG      COCAINE NEGATIVE  NEG      METHADONE NEGATIVE  NEG      OPIATES NEGATIVE  NEG      PCP(PHENCYCLIDINE) NEGATIVE  NEG      THC (TH-CANNABINOL) POSITIVE (A) NEG      Drug screen comment (NOTE)    LACTIC ACID    Collection Time: 07/09/19  2:37 AM   Result Value Ref Range    Lactic acid 0.8 0.4 - 2.0 MMOL/L       Recent Labs     07/09/19 0041   WBC 6.4   HGB 13.2   HCT 36.5*        Recent Labs     07/09/19 0041   *   K 3.7      CO2 28   BUN 8   CREA 1.07   *   CA 8.6     Recent Labs     07/09/19  0237 07/09/19  0041   SGOT  --  622*   ALT  --  292*   AP  --  91   TBILI  --  1.2*   TP  --  6.9   ALB  --  3.6 GLOB  --  3.3   LPSE 724*  --        No results for input(s): CPK, CKNDX, TROIQ in the last 72 hours. No lab exists for component: CPKMB    No results for input(s): INR, PTP, APTT in the last 72 hours. No lab exists for component: INREXT     No results for input(s): PH, PCO2, PO2 in the last 72 hours. Ct Abd Pelv W Cont    Result Date: 7/9/2019  IMPRESSION: Extensive inflammatory stranding around the heterogeneous, enlarged pancreas most consistent with acute pancreatitis. No drainable fluid collection.           Assessment & Plan:     Acute pancreatitis secondary to alcohol:  -bowel rest  -IV fluids  -pain control  -advance diet as tolerated    Elevated transaminases: due to alcohol  -monitor    Alcohol abuse:  - cessation  -monitor for withdrawal with CIWA  -PRN lorazepam  -goodie bag    History of GI bleed while on coumadin    History of dural venous sinus thrombosis: no longer on anticoagulation    DVT ppx: sq Lovenox  Code status: full  Disposition: home when ready    Signed By: Shruthi Jimenez MD     July 9, 2019

## 2019-07-10 LAB
ALBUMIN SERPL-MCNC: 2.9 G/DL (ref 3.5–5)
ALBUMIN/GLOB SERPL: 0.9 {RATIO} (ref 1.1–2.2)
ALP SERPL-CCNC: 73 U/L (ref 45–117)
ALT SERPL-CCNC: 133 U/L (ref 12–78)
ANION GAP SERPL CALC-SCNC: 6 MMOL/L (ref 5–15)
AST SERPL-CCNC: 221 U/L (ref 15–37)
BILIRUB DIRECT SERPL-MCNC: 0.3 MG/DL (ref 0–0.2)
BILIRUB SERPL-MCNC: 0.7 MG/DL (ref 0.2–1)
BUN SERPL-MCNC: 5 MG/DL (ref 6–20)
BUN/CREAT SERPL: 6 (ref 12–20)
CALCIUM SERPL-MCNC: 8.3 MG/DL (ref 8.5–10.1)
CHLORIDE SERPL-SCNC: 101 MMOL/L (ref 97–108)
CO2 SERPL-SCNC: 28 MMOL/L (ref 21–32)
CREAT SERPL-MCNC: 0.8 MG/DL (ref 0.7–1.3)
ERYTHROCYTE [DISTWIDTH] IN BLOOD BY AUTOMATED COUNT: 21.2 % (ref 11.5–14.5)
GLOBULIN SER CALC-MCNC: 3.2 G/DL (ref 2–4)
GLUCOSE SERPL-MCNC: 90 MG/DL (ref 65–100)
HCT VFR BLD AUTO: 36.5 % (ref 36.6–50.3)
HGB BLD-MCNC: 13 G/DL (ref 12.1–17)
LIPASE SERPL-CCNC: 305 U/L (ref 73–393)
MAGNESIUM SERPL-MCNC: 1.6 MG/DL (ref 1.6–2.4)
MCH RBC QN AUTO: 28 PG (ref 26–34)
MCHC RBC AUTO-ENTMCNC: 35.6 G/DL (ref 30–36.5)
MCV RBC AUTO: 78.5 FL (ref 80–99)
NRBC # BLD: 0 K/UL (ref 0–0.01)
NRBC BLD-RTO: 0 PER 100 WBC
PHOSPHATE SERPL-MCNC: 3.1 MG/DL (ref 2.6–4.7)
PLATELET # BLD AUTO: 149 K/UL (ref 150–400)
PMV BLD AUTO: 10.4 FL (ref 8.9–12.9)
POTASSIUM SERPL-SCNC: 4 MMOL/L (ref 3.5–5.1)
PROT SERPL-MCNC: 6.1 G/DL (ref 6.4–8.2)
RBC # BLD AUTO: 4.65 M/UL (ref 4.1–5.7)
SODIUM SERPL-SCNC: 135 MMOL/L (ref 136–145)
WBC # BLD AUTO: 4.5 K/UL (ref 4.1–11.1)

## 2019-07-10 PROCEDURE — 74011250636 HC RX REV CODE- 250/636: Performed by: HOSPITALIST

## 2019-07-10 PROCEDURE — 80048 BASIC METABOLIC PNL TOTAL CA: CPT

## 2019-07-10 PROCEDURE — 85027 COMPLETE CBC AUTOMATED: CPT

## 2019-07-10 PROCEDURE — 84100 ASSAY OF PHOSPHORUS: CPT

## 2019-07-10 PROCEDURE — 74011250637 HC RX REV CODE- 250/637: Performed by: HOSPITALIST

## 2019-07-10 PROCEDURE — 74011250637 HC RX REV CODE- 250/637: Performed by: NURSE PRACTITIONER

## 2019-07-10 PROCEDURE — 83735 ASSAY OF MAGNESIUM: CPT

## 2019-07-10 PROCEDURE — 80076 HEPATIC FUNCTION PANEL: CPT

## 2019-07-10 PROCEDURE — 65270000015 HC RM PRIVATE ONCOLOGY

## 2019-07-10 PROCEDURE — 74011000250 HC RX REV CODE- 250: Performed by: HOSPITALIST

## 2019-07-10 PROCEDURE — 83690 ASSAY OF LIPASE: CPT

## 2019-07-10 PROCEDURE — 94760 N-INVAS EAR/PLS OXIMETRY 1: CPT

## 2019-07-10 PROCEDURE — 36415 COLL VENOUS BLD VENIPUNCTURE: CPT

## 2019-07-10 RX ORDER — PANTOPRAZOLE SODIUM 40 MG/1
40 TABLET, DELAYED RELEASE ORAL
Status: DISCONTINUED | OUTPATIENT
Start: 2019-07-10 | End: 2019-07-11 | Stop reason: HOSPADM

## 2019-07-10 RX ADMIN — SODIUM CHLORIDE, SODIUM LACTATE, POTASSIUM CHLORIDE, AND CALCIUM CHLORIDE 150 ML/HR: 600; 310; 30; 20 INJECTION, SOLUTION INTRAVENOUS at 03:13

## 2019-07-10 RX ADMIN — Medication 10 ML: at 13:00

## 2019-07-10 RX ADMIN — Medication 10 ML: at 21:19

## 2019-07-10 RX ADMIN — Medication 10 ML: at 05:23

## 2019-07-10 RX ADMIN — MELATONIN 3 MG: at 21:19

## 2019-07-10 RX ADMIN — FOLIC ACID: 5 INJECTION, SOLUTION INTRAMUSCULAR; INTRAVENOUS; SUBCUTANEOUS at 10:21

## 2019-07-10 RX ADMIN — PANTOPRAZOLE SODIUM 40 MG: 40 TABLET, DELAYED RELEASE ORAL at 18:00

## 2019-07-10 RX ADMIN — SENNOSIDES,DOCUSATE SODIUM 1 TABLET: 8.6; 5 TABLET, FILM COATED ORAL at 09:05

## 2019-07-10 NOTE — PROGRESS NOTES
Oncology End of Shift Note      Bedside shift change report given to [facility], RN (incoming nurse) by Lilo Roberts (outgoing nurse) on Sotero Peals. Report included the following information SBAR, Kardex, Intake/Output and MAR. Shift Summary: Pt remained stable through the shift.  NPO. Labs drawn. No complaints of pain. Issues for Physician to Address:      Patient on Cardiac Monitoring?     [] Yes  [x] No    Rhythm:          Lilo Roberts

## 2019-07-10 NOTE — INTERDISCIPLINARY ROUNDS
Oncology Interdisciplinary rounds were held today to discuss patient plan of care and outcomes. The following members were present: Nursing, Physician, Case Management, Pharmacy, and PT/OT Actual Length of Stay: 1 DRG GLOS: 2.5 Expected Length of Stay: 2d 12h Plan            Discharge Advance diet  Discharge home with family when stable.

## 2019-07-10 NOTE — PROGRESS NOTES
Bedside shift change report given to Chasity Hilario (oncoming nurse) by Morena Mustafa (offgoing nurse). Report included the following information SBAR, Kardex, Intake/Output and MAR.

## 2019-07-10 NOTE — PROGRESS NOTES
Hospitalist Progress Note    NAME: Maria Del Rosario Rucker   :  1980   MRN:  [id number]       Assessment / Plan:  Diet increased to liquid today. If tolerates then increase again tomorrow and then look at possible discharge. Continue to monitor for CIWA. Acute recurrent alcoholic pancreatitis   · Symptoms include abdominal pain and vomiting; none at this time. · Lipase improved to 305 from 724 yesterday  · Diet increased to clear liquid  · Continue IVF  · Monitor electrolytes  · Pain management; denies pain at this time. · CT abdomen and pelvis shows:  Extensive inflammatory stranding around the heterogeneous, enlarged pancreasmost consistent with acute pancreatitis. No drainable fluid collection. · Educated on ETOH cessation      Elevated LFT's  · Likely due to ETOH   ·  ; improving from yesterday, ; improving from yesterday, Lipase 305; improving from yesterday   · Liver and gallbladder unremarkable on CT       Hyponatremia  · Continue IVF, monitor  · Na 135; improving     Chronic alcoholism with concern for alcohol withdrawal  · Reports drinking a 6-pack of beers every day, last drink per pt    · No signs of withdrawing at this time  · Counseled regarding alcohol cessation  · CIWA   · Continue melatonin   · Educated on sleep hygiene    H/o dural venous sinus thrombosis, completed 1 year coumadin       Code Status: full   Surrogate Decision Maker: Wife Xiomy  DVT Prophylaxis: Lovenox       [address] independent  Recommended Disposition: Home w/Family pending clinical progress     18.5 - 24.9 Normal weight / Body mass index is 20.37 kg/m². Subjective:     Chief Complaint / Reason for Physician Visit  \"I feel good. Im ready to go home. \"  Discussed with RN events overnight.      Review of Systems:  Symptom Y/N Comments  Symptom Y/N Comments   Fever/Chills N   Chest Pain N    Poor Appetite    Edema     Cough    Abdominal Pain N    Sputum    Joint Pain     SOB/ALEX N Pruritis/Rash     Nausea/vomit N   Tolerating PT/OT     Diarrhea N   Tolerating Diet Y    Constipation N   Other       Could NOT obtain due to:      Objective:     VITALS:   Last 24hrs VS reviewed since prior progress note. Most recent are:  Patient Vitals for the past 24 hrs:   Temp Pulse Resp BP SpO2   07/10/19 0738 99 °F (37.2 °C) 68 18 (!) 149/94 98 %   07/09/19 2330 98.9 °F (37.2 °C) 62 18 140/90 100 %   07/09/19 1901 98.8 °F (37.1 °C) 65 18 137/90 100 %   07/09/19 1615 98.3 °F (36.8 °C) 60 18 (!) 149/97 100 %   07/09/19 1226 98.5 °F (36.9 °C) 63 20 132/83 99 %   07/09/19 1000 98.6 °F (37 °C)  16 153/83 97 %       Intake/Output Summary (Last 24 hours) at 7/10/2019 0856  Last data filed at 7/9/2019 2335  Gross per 24 hour   Intake 2016.25 ml   Output 725 ml   Net 1291.25 ml        PHYSICAL EXAM:  General: Pleasant AA male. NAD   EENT:  EOMI. Anicteric sclerae. MMM  Resp:  CTA bilaterally, no wheezing or rales. No accessory muscle use  CV:  Regular  rhythm,  No edema  GI:  Soft, Non distended, Non tender.  +Bowel sounds  Neurologic:  Alert and oriented X 3, normal speech,   Psych:   Good insight. Not anxious nor agitated  Skin:  No rashes. No jaundice    Reviewed most current lab test results and cultures  YES  Reviewed most current radiology test results   YES  Review and summation of old records today    NO  Reviewed patient's current orders and MAR    YES  PMH/ reviewed - no change compared to H&P  ________________________________________________________________________  Care Plan discussed with:    Comments   Patient X    Family  X    RN     Care Manager     Consultant                       X Multidiciplinary team rounds were held today with , nursing, pharmacist and clinical coordinator. Patient's plan of care was discussed; medications were reviewed and discharge planning was addressed.      ________________________________________________________________________  Total NON critical care TIME:  25   Minutes    Total CRITICAL CARE TIME Spent:   Minutes non procedure based      Comments   >50% of visit spent in counseling and coordination of care     ________________________________________________________________________  Andry Patton, NP     Procedures: see electronic medical records for all procedures/Xrays and details which were not copied into this note but were reviewed prior to creation of Plan. LABS:  I reviewed today's most current labs and imaging studies.   Pertinent labs include:  Recent Labs     07/10/19  0521 07/09/19  0041   WBC 4.5 6.4   HGB 13.0 13.2   HCT 36.5* 36.5*   * 164     Recent Labs     07/10/19  0521 07/09/19  0041   * 134*   K 4.0 3.7    100   CO2 28 28   GLU 90 115*   BUN 5* 8   CREA 0.80 1.07   CA 8.3* 8.6   MG 1.6  --    PHOS 3.1  --    ALB 2.9* 3.6   TBILI 0.7 1.2*   SGOT 221* 622*   * 292*       Signed: Andry Patton NP

## 2019-07-10 NOTE — PROGRESS NOTES
Initial Nutrition Assessment:    INTERVENTIONS/RECOMMENDATIONS:   · Diet progression as tolerated  · Consider low fat diet until pancreatitis resolves  · PO thiamine, folic acid and MVI once goody bag is discontinued     ASSESSMENT:   Chart reviewed, medically noted for Acute recurrent alcoholic pancreatitis, Chronic alcoholism and PMH shown below. Nutrition referral triggered due to MST score however pt denies recent weight loss. He reports eating well until recent abdominal pain and vomiting. He is hungry this morning and looking forward to solid foods.      Past Medical History:   Diagnosis Date    Anemia     4/3/19    Bleeding hemorrhoid     Elevated cholesterol     Hypertension     Pancreatitis        Diet Order: Clear liquids  % Eaten:    Patient Vitals for the past 72 hrs:   % Diet Eaten   07/09/19 1107 0 %     Pertinent Medications: [x]Reviewed: goody bag, pericolace,   Pertinent Labs: [x]Reviewed:   Food Allergies: [x]NKFA  []Other   Last BM: 7/8  Edema:   n/a     []RUE   []LUE   []RLE   []LLE      Pressure Injury:  n/a    [] Stage I   [] Stage II   [] Stage III   [] Stage IV      Wt Readings from Last 30 Encounters:   07/09/19 64.4 kg (141 lb 15.6 oz)   04/10/19 69.7 kg (153 lb 9.6 oz)   04/03/19 70.8 kg (156 lb 1.4 oz)   04/01/19 68 kg (150 lb)   12/20/17 59.9 kg (132 lb)   10/20/17 64.9 kg (143 lb)   10/02/17 64.9 kg (143 lb)   09/15/17 64.9 kg (143 lb)   08/31/17 64 kg (141 lb)   08/17/17 66.7 kg (147 lb)   08/03/17 67.6 kg (149 lb)   07/27/17 64.9 kg (143 lb)   07/20/17 64.4 kg (142 lb)   07/17/17 67.1 kg (148 lb)   07/11/17 62.4 kg (137 lb 9.1 oz)   07/11/17 68 kg (150 lb)   07/05/17 68 kg (150 lb)   10/20/16 70.8 kg (156 lb)   12/27/14 59 kg (130 lb 1.1 oz)   06/28/13 67.5 kg (148 lb 13 oz)   06/25/12 64.6 kg (142 lb 6.7 oz)       Anthropometrics:   Height: 5' 10\" (177.8 cm) Weight: 64.4 kg (141 lb 15.6 oz)   IBW (%IBW):   ( ) UBW (%UBW):   (  %)   Last Weight Metrics:  Weight Loss Metrics 7/9/2019 4/10/2019 4/3/2019 4/1/2019 12/20/2017 10/20/2017 10/2/2017   Today's Wt 141 lb 15.6 oz 153 lb 9.6 oz 156 lb 1.4 oz 150 lb 132 lb 143 lb 143 lb   BMI 20.37 kg/m2 22.04 kg/m2 22.4 kg/m2 21.52 kg/m2 18.94 kg/m2 20.52 kg/m2 20.52 kg/m2       BMI: Body mass index is 20.37 kg/m². This BMI is indicative of:   []Underweight    [x]Normal    []Overweight    [] Obesity   [] Extreme Obesity (BMI>40)     Estimated Nutrition Needs (Based on):   2025 Kcals/day(BMR: 1550 x 1.3) , 50 g Protein  Carbohydrate: At Least 130 g/day  Fluids: 2025 mL/day (1ml/kcal) or per primary team    NUTRITION DIAGNOSES:   Problem:  Altered GI function      Etiology: related to acute pancreatitis     Signs/Symptoms: as evidenced by nausea, vomiting and abdominal pain on admission      NUTRITION INTERVENTIONS:  Meals/Snacks: General/healthful diet                  GOAL:   consume >50% of meals in 5-7 days    LEARNING NEEDS (Diet, Food/Nutrient-Drug Interaction):    [x] None Identified   [] Identified and Education Provided/Documented   [] Identified and Pt declined/was not appropriate     Cultureal, Nondenominational, OR Ethnic Dietary Needs:    [x] None Identified   [] Identified and Addressed     [x] Interdisciplinary Care Plan Reviewed/Documented    [x] Discharge Planning:  General healthy diet abstaining from ETOH     MONITORING /EVALUATION:      Food/Nutrient Intake Outcomes:  Total energy intake  Physical Signs/Symptoms Outcomes: Weight/weight change, Electrolyte and renal profile, GI    NUTRITION RISK:    [] High              [] Moderate           []  Low  [x]  Minimal/Uncompromised    PT SEEN FOR:    []  MD Consult: []Calorie Count      []Diabetic Diet Education        []Diet Education     []Electrolyte Management     []General Nutrition Management and Supplements     []Management of Tube Feeding     []TPN Recommendations    [x]  RN Referral:  [x]MST score >=2     []Enteral/Parenteral Nutrition PTA     []Pregnant: Gestational DM or Multigestation     []Pressure Ulcer/Wound Care needs        []  Low BMI  []  LOS Referral       Alma Villegas RDN  Pager [phone]  Weekend Pager [phone]

## 2019-07-11 VITALS
OXYGEN SATURATION: 98 % | TEMPERATURE: 98.4 F | HEART RATE: 82 BPM | RESPIRATION RATE: 16 BRPM | BODY MASS INDEX: 20.33 KG/M2 | SYSTOLIC BLOOD PRESSURE: 149 MMHG | HEIGHT: 70 IN | DIASTOLIC BLOOD PRESSURE: 95 MMHG | WEIGHT: 141.98 LBS

## 2019-07-11 LAB
ALBUMIN SERPL-MCNC: 3.4 G/DL (ref 3.5–5)
ALBUMIN/GLOB SERPL: 0.9 {RATIO} (ref 1.1–2.2)
ALP SERPL-CCNC: 76 U/L (ref 45–117)
ALT SERPL-CCNC: 106 U/L (ref 12–78)
ANION GAP SERPL CALC-SCNC: 7 MMOL/L (ref 5–15)
AST SERPL-CCNC: 144 U/L (ref 15–37)
BILIRUB SERPL-MCNC: 0.9 MG/DL (ref 0.2–1)
BUN SERPL-MCNC: 5 MG/DL (ref 6–20)
BUN/CREAT SERPL: 6 (ref 12–20)
CALCIUM SERPL-MCNC: 9.2 MG/DL (ref 8.5–10.1)
CHLORIDE SERPL-SCNC: 100 MMOL/L (ref 97–108)
CO2 SERPL-SCNC: 29 MMOL/L (ref 21–32)
CREAT SERPL-MCNC: 0.81 MG/DL (ref 0.7–1.3)
ERYTHROCYTE [DISTWIDTH] IN BLOOD BY AUTOMATED COUNT: 21.2 % (ref 11.5–14.5)
GLOBULIN SER CALC-MCNC: 3.8 G/DL (ref 2–4)
GLUCOSE SERPL-MCNC: 99 MG/DL (ref 65–100)
HCT VFR BLD AUTO: 38.4 % (ref 36.6–50.3)
HGB BLD-MCNC: 13.3 G/DL (ref 12.1–17)
MCH RBC QN AUTO: 27.5 PG (ref 26–34)
MCHC RBC AUTO-ENTMCNC: 34.6 G/DL (ref 30–36.5)
MCV RBC AUTO: 79.3 FL (ref 80–99)
NRBC # BLD: 0 K/UL (ref 0–0.01)
NRBC BLD-RTO: 0 PER 100 WBC
PLATELET # BLD AUTO: 124 K/UL (ref 150–400)
PMV BLD AUTO: 10.3 FL (ref 8.9–12.9)
POTASSIUM SERPL-SCNC: 3.8 MMOL/L (ref 3.5–5.1)
PROT SERPL-MCNC: 7.2 G/DL (ref 6.4–8.2)
RBC # BLD AUTO: 4.84 M/UL (ref 4.1–5.7)
SODIUM SERPL-SCNC: 136 MMOL/L (ref 136–145)
WBC # BLD AUTO: 5.4 K/UL (ref 4.1–11.1)

## 2019-07-11 PROCEDURE — 74011250637 HC RX REV CODE- 250/637: Performed by: HOSPITALIST

## 2019-07-11 PROCEDURE — 74011250637 HC RX REV CODE- 250/637: Performed by: NURSE PRACTITIONER

## 2019-07-11 PROCEDURE — 74011250636 HC RX REV CODE- 250/636: Performed by: HOSPITALIST

## 2019-07-11 PROCEDURE — 80053 COMPREHEN METABOLIC PANEL: CPT

## 2019-07-11 PROCEDURE — 94760 N-INVAS EAR/PLS OXIMETRY 1: CPT

## 2019-07-11 PROCEDURE — 85027 COMPLETE CBC AUTOMATED: CPT

## 2019-07-11 PROCEDURE — 36415 COLL VENOUS BLD VENIPUNCTURE: CPT

## 2019-07-11 RX ADMIN — PANTOPRAZOLE SODIUM 40 MG: 40 TABLET, DELAYED RELEASE ORAL at 08:39

## 2019-07-11 RX ADMIN — ENOXAPARIN SODIUM 40 MG: 40 INJECTION SUBCUTANEOUS at 08:40

## 2019-07-11 RX ADMIN — SENNOSIDES,DOCUSATE SODIUM 1 TABLET: 8.6; 5 TABLET, FILM COATED ORAL at 08:39

## 2019-07-11 NOTE — PROGRESS NOTES
Problem: Pain - Acute  Goal: *Control of acute pain  Outcome: Resolved/Met     Problem: Pancreatitis  Goal: *Control of acute pain  Outcome: Resolved/Met  Goal: *Absence of nausea/vomiting  Outcome: Resolved/Met  Goal: *Optimize nutritional status  Outcome: Resolved/Met  Goal: *Labs within defined limits  Outcome: Resolved/Met     Problem: Patient Education: Go to Patient Education Activity  Goal: Patient/Family Education  Outcome: Resolved/Met     Problem: Falls - Risk of  Goal: *Absence of Falls  Description  Document Pretty Fall Risk and appropriate interventions in the flowsheet.   Outcome: Resolved/Met     Problem: Patient Education: Go to Patient Education Activity  Goal: Patient/Family Education  Outcome: Resolved/Met

## 2019-07-11 NOTE — PROGRESS NOTES
Patient educated on discharge instructions, medications, and follow up appointments. Opportunity for questions and clarifications given. PIV removed prior to discharge. Patient ambulated to personal vehicle at time of discharge with all personal belongings.

## 2019-07-11 NOTE — DISCHARGE SUMMARY
Hospitalist Discharge Summary     Patient ID:  Viv Chaudhry  [id number]  26 y.o.  1980 7/9/2019    PCP on record: Fabian Collins MD    Admit date: 7/9/2019  Discharge date and time: 7/11/2019    DISCHARGE DIAGNOSIS:    Alcoholic Pancreatitis  Elevated LFT  Hyponatremia  Chronic Alcohol use  H/o dural venous sinus thrombosis     CONSULTATIONS:  None    Excerpted HPI from H&P of Prabhu Lundberg MD:  [place] y.o man w/ alcohol abuse presents with abdominal pain. Symptoms began yesterday with severe epigastric pain, non-radiating, associated with nausea and dry-heaving. He drinks at least a 6 pack of beer daily, last drink was on 7/6/19. He's had acute pancreatitis in the past due to alcohol and this is similar. No bleeding, diarrhea, constipation. \"  ______________________________________________________________________  DISCHARGE SUMMARY/HOSPITAL COURSE:  for full details see H&P, daily progress notes, labs, consult notes. Acute recurrent alcoholic pancreatitis   · Symptoms include abdominal pain and vomiting; resolved. · Lipase improved to 305 from 724 yesterday  · Diet increased to clear liquid  · Recieved IVF  · Monitor electrolytes  · Pain management; denies pain at this time. · CT abdomen and pelvis shows:  Extensive inflammatory stranding around the heterogeneous, enlarged pancreasmost consistent with acute pancreatitis. No drainable fluid collection. · Educated on ETOH cessation      Elevated LFT's  · Likely due to ETOH   ·  ; improving from 622; improving from yesterday, Lipase 305 improved from 724  · Liver and gallbladder unremarkable on CT       Hyponatremia  · Continue IVF, monitor  · Na 136;  WNL      Chronic alcoholism with concern for alcohol withdrawal  · Reports drinking a 6-pack of beers every day, last drink per pt 7/6   · No signs of withdrawing at this time  · Counseled regarding alcohol cessation  · CIWA   · Continue melatonin   · Educated on sleep hygiene     H/o dural venous sinus thrombosis, completed 1 year coumadin   _____________________________________________________________________  Patient seen and examined by me on discharge day. Pertinent Findings:  Gen:    Not in distress  Chest: Clear lungs  CVS:   Regular rhythm. No edema  Abd:  Soft, not distended, not tender  Neuro:  Alert, oriented x 3   _______________________________________________________________________  DISCHARGE MEDICATIONS:   Current Discharge Medication List      CONTINUE these medications which have NOT CHANGED    Details   ondansetron hcl (ZOFRAN) 4 mg tablet TAKE 1 TABLET BY MOUTH EVERY EIGHT (8) HOURS AS NEEDED FOR NAUSEA. Qty: 30 Tab, Refills: 0    Comments: Generic For:ZOFRAN 4MG  Associated Diagnoses: Nausea      ferrous sulfate (IRON) 325 mg (65 mg iron) tablet Take 325 mg by mouth every fourty-eight (48) hours. cetirizine (ZYRTEC) 10 mg tablet Take 10 mg by mouth daily. pantoprazole (PROTONIX) 40 mg tablet TAKE 1 TABLET BY MOUTH DAILY FOR STOMACH ACID  Qty: 90 Tab, Refills: 3    Comments: Generic For:PROTONIX 40MG    N O T I C E    Last quantity doesn't match original quantity  Associated Diagnoses: Epigastric pain               Patient Follow Up Instructions: Activity: Activity as tolerated  Diet: Regular Diet  Wound Care: None needed    Follow-up with PCP in 1 week.     Follow-up Information     Follow up With Specialties Details Why Contact Mary Cooper, [address] [place] [address] [phone]          ________________________________________________________________    Risk of deterioration: Low    Condition at Discharge:  Stable  __________________________________________________________________    Disposition  Home with family, no needs    ____________________________________________________________________    Code Status: Full Code  ___________________________________________________________________      Total time in minutes spent coordinating this discharge (includes going over instructions, follow-up, prescriptions, and preparing report for sign off to her PCP) :  31 minutes    Signed:  Lynda Shetty NP

## 2019-07-11 NOTE — PROGRESS NOTES
Oncology End of Shift Note      Bedside shift change report given to Mauricio Jesus (incoming nurse) by Jennifer Wong (outgoing nurse) on [facility]. Report included the following information SBAR, Kardex and MAR. Shift Summary: Patient was given banana bag. Diet was advanced from NPO with ice chips to clear liquid diet and from clear liquids to GI lite. Patient states he is tolerating food. New IV was inserted. Protonix was started. Issues for Physician to Address:  None. Patient on Cardiac Monitoring?     [] Yes  [x] No    Rhythm:              Jennifer Wong

## 2019-07-11 NOTE — PROGRESS NOTES
Oncology End of Shift Note      Bedside shift change report given to Mekhi Roberto RN (incoming nurse) by Carlene Rizzo (outgoing nurse) on [facility]. Report included the following information SBAR, Kardex and MAR. Shift Summary: pt. Stable during shift, labs drawn and no complaints of pain.            Carlene Rizzo

## 2019-07-11 NOTE — DISCHARGE INSTRUCTIONS
Patient Education        Pancreatitis: Care Instructions  Your Care Instructions    The pancreas is an organ behind the stomach. It makes hormones and enzymes to help your body digest food. But if these enzymes attack the pancreas, it can get inflamed. This is called pancreatitis. Most cases are caused by gallstones or by heavy alcohol use. If you take care of yourself at home, it will help you get better. It will also help you avoid more problems with your pancreas. Follow-up care is a key part of your treatment and safety. Be sure to make and go to all appointments, and call your doctor if you are having problems. It's also a good idea to know your test results and keep a list of the medicines you take. How can you care for yourself at home? · Drink clear liquids and eat bland foods until you feel better. Rochelle foods include rice, dry toast, and crackers. They also include bananas and applesauce. · Eat a low-fat diet until your doctor says your pancreas is healed. · Do not drink alcohol. Tell your doctor if you need help to quit. Counseling, support groups, and sometimes medicines can help you stay sober. · Be safe with medicines. Read and follow all instructions on the label. ? If the doctor gave you a prescription medicine for pain, take it as prescribed. ? If you are not taking a prescription pain medicine, ask your doctor if you can take an over-the-counter medicine. · If your doctor prescribed antibiotics, take them as directed. Do not stop taking them just because you feel better. You need to take the full course of antibiotics. · Get extra rest until you feel better. To prevent future problems with your pancreas  · Do not drink alcohol. · Tell your doctors and pharmacist that you've had pancreatitis. They can help you avoid medicines that may cause this problem again. When should you call for help? Call 911 anytime you think you may need emergency care.  For example, call if:    · You vomit blood or what looks like coffee grounds.     · Your stools are maroon or very bloody.    Call your doctor now or seek immediate medical care if:    · You have new or worse belly pain.     · Your stools are black and look like tar, or they have streaks of blood.     · You are vomiting.    Watch closely for changes in your health, and be sure to contact your doctor if:    · You do not get better as expected. Where can you learn more? Go to http://carli-aidan.info/. Enter C510 in the search box to learn more about \"Pancreatitis: Care Instructions. \"  Current as of: 2018  Content Version: 11.9  © 2881-3692 [employer]. Care instructions adapted under license by [employer] (which disclaims liability or warranty for this information). If you have questions about a medical condition or this instruction, always ask your healthcare professional. [place] [address] any warranty or liability for your use of this information. HOSPITALIST DISCHARGE INSTRUCTIONS    NAME: Sintia Schroeder   :  1980   MRN:  [id number]     Date/Time:  2019 8:15 AM    ADMIT DATE: 2019   DISCHARGE DATE: 2019     Attending Physician: Glory Del Angel NP    DISCHARGE DIAGNOSIS:  Alcoholic Pancreatitis  Elevated LFT  Hyponatremia  Chronic Alcohol use  H/o dural venous sinus thrombosis       Medications: Per above medication reconciliation. Pain Management: per above medications    Recommended diet: Regular Diet    Recommended activity: Activity as tolerated    Wound care: None    Indwelling devices:  None    Supplemental Oxygen: None    Code status: Full        Outside physician follow up:     Follow-up Information     Follow up With Specialties Details Why Contact Info    Ana Cosme, [address] [facility] [address]  [address] [place] [address] [phone]              Information obtained by :  I understand that if any problems occur once I am at home I am to contact my physician. I understand and acknowledge receipt of the instructions indicated above.                                                                                                                                            Physician's or R.N.'s Signature                                                                  Date/Time                                                                                                                                              Patient or Repres

## 2019-07-12 ENCOUNTER — PATIENT OUTREACH (OUTPATIENT)
Dept: FAMILY MEDICINE CLINIC | Age: 39
End: 2019-07-12

## 2019-07-12 NOTE — PROGRESS NOTES
7-12-19: NN left message for Angelica Gibsonrahel (on [address] dated 4-1-19) to return call. Unable to leave message for patient as automated message states mailbox is full.     7-23-19: NN left message for Angelica Gibsonrahel to return call. Unable to leave message for patient as mailbox remains full. 'Unable to Reach' letter mailed to patient today.

## 2019-07-12 NOTE — LETTER
7/23/2019 2:45 PM 
 
 
 
 
Maria Del Rosario Rucker [address] [facility] [place] [address] Mr. Ware: My name is Uche Ferguson and I am the Nurse Navigator on Dr. Martin Ruiz team. I call patients who were recently discharged from the hospital or emergency department. We are committed to providing you excellent care. I have been unable to reach you by phone. Please contact me at [phone] regarding your recent hospital visit. We appreciate the confidence youve shown by selecting us to provide your healthcare needs and look forward to hearing from you soon. Sincerely,  
Uche Ferguson, BSN, RN Ambulatory Nurse Navigator [address] [facility] [address] [place] [address], [address] [facility], [facility], [address] [place] [address] 
[phone]

## 2019-07-15 ENCOUNTER — TELEPHONE (OUTPATIENT)
Dept: FAMILY MEDICINE CLINIC | Age: 39
End: 2019-07-15

## 2019-07-15 NOTE — TELEPHONE ENCOUNTER
Returned call to patients wife, no answer message left telling her she can return our call if she still needs to. Also called home # no answer there either.

## 2019-07-26 ENCOUNTER — OFFICE VISIT (OUTPATIENT)
Dept: FAMILY MEDICINE CLINIC | Age: 39
End: 2019-07-26

## 2019-07-26 VITALS
TEMPERATURE: 98.6 F | HEART RATE: 74 BPM | HEIGHT: 70 IN | DIASTOLIC BLOOD PRESSURE: 84 MMHG | RESPIRATION RATE: 14 BRPM | WEIGHT: 141 LBS | BODY MASS INDEX: 20.19 KG/M2 | OXYGEN SATURATION: 97 % | SYSTOLIC BLOOD PRESSURE: 123 MMHG

## 2019-07-26 DIAGNOSIS — S43.006A SHOULDER SEPARATION: Primary | ICD-10-CM

## 2019-07-26 DIAGNOSIS — G47.00 INSOMNIA, UNSPECIFIED TYPE: ICD-10-CM

## 2019-07-26 DIAGNOSIS — R79.89 ELEVATED LFTS: ICD-10-CM

## 2019-07-26 RX ORDER — TRAZODONE HYDROCHLORIDE 50 MG/1
50 TABLET ORAL
Qty: 30 TAB | Refills: 3 | Status: SHIPPED | OUTPATIENT
Start: 2019-07-26 | End: 2020-02-27

## 2019-07-26 RX ORDER — ONDANSETRON 8 MG/1
8 TABLET, ORALLY DISINTEGRATING ORAL
Qty: 30 TAB | Refills: 2 | Status: SHIPPED | OUTPATIENT
Start: 2019-07-26 | End: 2019-11-13 | Stop reason: SDUPTHER

## 2019-07-26 NOTE — PATIENT INSTRUCTIONS
Trazodone 50 mg tablet  Try 50 mg  If not effective may try 100 mg  If not effective try 150 mg    150 mg is the max dose

## 2019-07-26 NOTE — PROGRESS NOTES
.  Chief Complaint   Patient presents with    Transitions Of Care     . 1. Have you been to the ER, urgent care clinic since your last visit? Hospitalized since your last visit? yes    2. Have you seen or consulted any other health care providers outside of the [facility] since your last visit? Include any pap smears or colon screening. Yes    . Health Maintenance Due   Topic Date Due    Pneumococcal 0-64 years (1 of 1 - PPSV23) 09/21/1987    DTaP/Tdap/Td series (1 - Tdap) 09/21/2002     . Lorene Read

## 2019-07-26 NOTE — PROGRESS NOTES
CECILIA Spencer is a 41 y.o. male who presents follow-up hospital stay [facility] all 7/9-7/11. Patient out reach completed 7/12. He was admitted with acute alcoholic pancreatitis. Symptoms improved quickly with fluids. Patient had been abstinent from alcohol for several months until he was doing good but he got up why we could not sleep and went back to drinking to try and get some sleep. Was only drinking for about 3 weeks he says before he had a pancreatitis flare. Tells me that in and given 24-hour. He might get \"2 hours of sleep\". He has been given Lunesta in the past which seemed to help. The Xanax he was given as part of the CIWA protocol in the hospital seem to help. I educated him on these Z drugs and benzos and why they are not good sleep medications    PMHx:  Past Medical History:   Diagnosis Date    Anemia     4/3/19    Bleeding hemorrhoid     Elevated cholesterol     Hypertension     Pancreatitis        Meds:   Current Outpatient Medications   Medication Sig Dispense Refill    traZODone (DESYREL) 50 mg tablet Take 1 Tab by mouth nightly. 30 Tab 3    ondansetron hcl (ZOFRAN) 4 mg tablet TAKE 1 TABLET BY MOUTH EVERY EIGHT (8) HOURS AS NEEDED FOR NAUSEA. 30 Tab 0    ferrous sulfate (IRON) 325 mg (65 mg iron) tablet Take 325 mg by mouth every fourty-eight (48) hours.  cetirizine (ZYRTEC) 10 mg tablet Take 10 mg by mouth daily.  pantoprazole (PROTONIX) 40 mg tablet TAKE 1 TABLET BY MOUTH DAILY FOR STOMACH ACID 90 Tab 3       Allergies:    Allergies   Allergen Reactions    Peanut Rash     Face swelling, scratchy throat, rash       Smoker:  Social History     Tobacco Use   Smoking Status Current Every Day Smoker    Packs/day: 0.25   Smokeless Tobacco Current User       ETOH:   Social History     Substance and Sexual Activity   Alcohol Use Yes    Comment: one beer per day       FH:   Family History   Problem Relation Age of Onset    Hypertension Mother     Breast Cancer Mother     Hypertension Father     Heart Attack Father     No Known Problems Sister        ROS:   As listed in HPI. In addition:  Constitutional:   No headache, fever, fatigue, weight loss or weight gain      Cardiac:    No chest pain      Resp:   No cough or shortness of breath      Neuro   No loss of consciousness, dizziness, seizures      Physical Exam:  Blood pressure 123/84, pulse 74, temperature 98.6 °F (37 °C), temperature source Oral, resp. rate 14, height 5' 10\" (1.778 m), weight 141 lb (64 kg), SpO2 97 %. GEN: No apparent distress. Alert and oriented and responds to all questions appropriately. NEUROLOGIC:  No focal neurologic deficits. Strength and sensation grossly intact. Coordination and gait grossly intact. EXT: Well perfused. No edema. SKIN: No obvious rashes. Has what appears to shoulder separation that he would like me to look at. If she is a full range of motion in the shoulder and full strength       Assessment and Plan     Acute alcoholic pancreatitis  Currently abstinent from alcohol. Feeling better    Alcoholic hepatitis  Transaminase concerningly high during  hospitalization. Did not return to normal upon discharge. CT abdomen did not show any evidence liver abnormality  Might consider liver ultrasound if transaminase still elevated here 2 weeks later    Insomnia  He states this is the reason why surgery he again. Really liked Lunesta  Try trazodone, has not tried this yet    Shoulder separation  Low-grade on exam, present for 4 months. Not causing him any pain. Refer to Ortho for opinion    Liver looking better on CMP      ICD-10-CM ICD-9-CM    1. Shoulder separation S43.006A 831.00 REFERRAL TO ORTHOPEDICS   2. Insomnia, unspecified type G47.00 780.52 traZODone (DESYREL) 50 mg tablet   3. Elevated LFTs X04.7 099.6 METABOLIC PANEL, COMPREHENSIVE       AVS given.  Pt expressed understanding of instructions

## 2019-07-27 LAB
ALBUMIN SERPL-MCNC: 4.1 G/DL (ref 3.5–5.5)
ALBUMIN/GLOB SERPL: 1.6 {RATIO} (ref 1.2–2.2)
ALP SERPL-CCNC: 65 IU/L (ref 39–117)
ALT SERPL-CCNC: 21 IU/L (ref 0–44)
AST SERPL-CCNC: 42 IU/L (ref 0–40)
BILIRUB SERPL-MCNC: 0.4 MG/DL (ref 0–1.2)
BUN SERPL-MCNC: 8 MG/DL (ref 6–20)
BUN/CREAT SERPL: 10 (ref 9–20)
CALCIUM SERPL-MCNC: 9.4 MG/DL (ref 8.7–10.2)
CHLORIDE SERPL-SCNC: 103 MMOL/L (ref 96–106)
CO2 SERPL-SCNC: 22 MMOL/L (ref 20–29)
CREAT SERPL-MCNC: 0.79 MG/DL (ref 0.76–1.27)
GLOBULIN SER CALC-MCNC: 2.6 G/DL (ref 1.5–4.5)
GLUCOSE SERPL-MCNC: 104 MG/DL (ref 65–99)
POTASSIUM SERPL-SCNC: 3.9 MMOL/L (ref 3.5–5.2)
PROT SERPL-MCNC: 6.7 G/DL (ref 6–8.5)
SODIUM SERPL-SCNC: 142 MMOL/L (ref 134–144)

## 2019-08-21 ENCOUNTER — PATIENT OUTREACH (OUTPATIENT)
Dept: FAMILY MEDICINE CLINIC | Age: 39
End: 2019-08-21

## 2019-08-21 NOTE — PROGRESS NOTES
NN did not receive response from patient and/or Deny Ornelas (on PHI) in regards to previous messages left. NN also did not receive response from patient in regards to 'Unable to Reach' letter mailed to him on 7-23-19. Patient has graduated from the Transitions of Care Coordination  program on 8-21-19. No further nurse navigator follow up scheduled. Patient has nurse navigator's contact information for any further questions, concerns, or needs as contact information was provided in 'Unable to Reach' letter mailed to patient on 7-23-19  . Patients upcoming visits:  No future appointments.

## 2019-11-13 DIAGNOSIS — R10.13 EPIGASTRIC PAIN: ICD-10-CM

## 2019-11-13 NOTE — TELEPHONE ENCOUNTER
Patient's wife calling to get these meds called in to [facility] pharmacy as soon as possible. She says he has been out for 2 weeks and was told by  pharmacy that they had faxed us. They have not, I have no refill requests from them.  Please call albert when these have been called in at [phone]

## 2019-11-14 RX ORDER — PANTOPRAZOLE SODIUM 40 MG/1
TABLET, DELAYED RELEASE ORAL
Qty: 90 TAB | Refills: 3 | Status: SHIPPED | OUTPATIENT
Start: 2019-11-14 | End: 2020-01-07

## 2019-11-14 RX ORDER — ONDANSETRON 8 MG/1
8 TABLET, ORALLY DISINTEGRATING ORAL
Qty: 30 TAB | Refills: 2 | Status: SHIPPED | OUTPATIENT
Start: 2019-11-14 | End: 2020-07-20 | Stop reason: SDUPTHER

## 2020-01-07 DIAGNOSIS — R10.13 EPIGASTRIC PAIN: ICD-10-CM

## 2020-01-07 RX ORDER — PANTOPRAZOLE SODIUM 40 MG/1
TABLET, DELAYED RELEASE ORAL
Qty: 90 TAB | Refills: 3 | Status: SHIPPED | OUTPATIENT
Start: 2020-01-07 | End: 2021-08-23

## 2020-02-20 ENCOUNTER — TELEPHONE (OUTPATIENT)
Dept: FAMILY MEDICINE CLINIC | Age: 40
End: 2020-02-20

## 2020-02-20 NOTE — TELEPHONE ENCOUNTER
Toña  with [facility] is calling to let you know pt is in the hospital with Acute Pancreatitis and she will be watching over him for up to 30 days if needed she wanted you to know if you have any questions she can be reached at [phone] xtn [phone]

## 2020-02-27 ENCOUNTER — OFFICE VISIT (OUTPATIENT)
Dept: FAMILY MEDICINE CLINIC | Age: 40
End: 2020-02-27

## 2020-02-27 VITALS
RESPIRATION RATE: 16 BRPM | WEIGHT: 135 LBS | HEART RATE: 78 BPM | BODY MASS INDEX: 19.33 KG/M2 | SYSTOLIC BLOOD PRESSURE: 122 MMHG | TEMPERATURE: 98.2 F | OXYGEN SATURATION: 96 % | DIASTOLIC BLOOD PRESSURE: 80 MMHG | HEIGHT: 70 IN

## 2020-02-27 DIAGNOSIS — K85.20 ALCOHOL-INDUCED ACUTE PANCREATITIS, UNSPECIFIED COMPLICATION STATUS: ICD-10-CM

## 2020-02-27 DIAGNOSIS — E87.6 HYPOKALEMIA: ICD-10-CM

## 2020-02-27 DIAGNOSIS — R79.89 ELEVATED LFTS: ICD-10-CM

## 2020-02-27 DIAGNOSIS — Z09 HOSPITAL DISCHARGE FOLLOW-UP: Primary | ICD-10-CM

## 2020-02-27 NOTE — PATIENT INSTRUCTIONS
Liver Function Tests: About These Tests  What is it? Liver function tests check how well your liver is working. Some tests measure the amount of certain enzymes in your blood to check whether the liver is damaged or inflamed. Other tests measure how well the liver can make important proteins or clear waste products from the body. Your doctor will use the test results to help look for certain conditions, such as hepatitis, cirrhosis, or gallbladder problems. Test results that are not normal do not always mean there is a problem with your liver. Your doctor can determine if there is a problem based on your results. The tests may include:  · Alkaline phosphatase (ALP). This test measures the amount of the enzyme ALP in your blood. · Total protein. A total serum protein test measures the amounts of two major groups of proteins in your blood (albumin and globulin) and the total amount of protein. · Bilirubin. This test measures the amount of bilirubin in your blood. When bilirubin levels are high, the skin and whites of the eyes may appear yellow (jaundice). This may be caused by liver disease. · Aspartate aminotransferase (AST) and alanine aminotransferase (ALT). These tests measure the amount of the enzymes AST and ALT in your blood. (Aminotransferase is also known as a transaminase. High levels may be called transaminitis.)  Why is this test done? Liver function tests check how well your liver is working. Some tests help show whether your liver is damaged or inflamed. Your doctor may order liver function tests if you have symptoms of liver disease. These tests also may be done to see how well a treatment for liver disease is working. How can you prepare for the test?  In general, you do not need to prepare before having these tests. Your doctor may give you some specific instructions to prepare. What happens during the test?  A health professional takes a sample of your blood.   What happens after the test?  You will probably be able to go home right away. When should you call for help? Watch closely for changes in your health, and be sure to contact your doctor if you have any problems. Follow-up care is a key part of your treatment and safety. Be sure to make and go to all appointments, and call your doctor if you are having problems. It's also a good idea to keep a list of the medicines you take. Ask your doctor when you can expect to have your test results. Where can you learn more? Go to http://carli-aidan.info/. Enter J916 in the search box to learn more about \"Liver Function Tests: About These Tests. \"  Current as of: March 28, 2019  Content Version: 12.2  © 7626-4930 [employer]. Care instructions adapted under license by [employer] (which disclaims liability or warranty for this information). If you have questions about a medical condition or this instruction, always ask your healthcare professional. [address] any warranty or liability for your use of this information. Learning About Acute Pancreatitis  What is acute pancreatitis? The pancreas is an organ behind the stomach. It makes hormones like insulin that help control how your body uses sugar. It also makes enzymes that help you digest food. Usually these enzymes flow from the pancreas to the intestines. But if they leak into the pancreas, they can irritate it and cause pain and swelling. When this happens suddenly, it's called acute pancreatitis. What causes it? Most of the time, acute pancreatitis is caused by gallstones or by heavy alcohol use. But several other things can cause it, such as:  · High levels of fats in the blood. · An injury. · A problem after a surgery or a procedure. · Certain medicines. What are the symptoms? The main symptom is pain in the upper belly. The pain can be severe. You also may have a fever, nausea, or vomiting.  Some people get so sick that they have problems breathing. They also may have low blood pressure. How is it diagnosed? Your doctor will diagnose pancreatitis with an exam and by looking at your lab tests. Your doctor may think that you have this problem based on your symptoms and where you have pain in your belly. You may have blood tests of enzymes called amylase and lipase. In pancreatitis, the level of these enzymes is usually much higher than normal.  You also may have imaging tests of the belly. These may include an ultrasound, a CT scan, or an MRI. A special MRI called MRCP can show images of the bile ducts. This test can be very helpful when gallstones are causing the problem. How is it treated? Treatment of acute pancreatitis usually takes place in the hospital. It focuses on taking care of pain and supporting your body while your pancreas heals. In severe cases, treatment may occur in an intensive care unit to support breathing, treat serious infections, or help raise very low blood pressure. If a gallstone is causing the problem, the gallstone may need to be removed. This is done during a procedure called ERCP. The doctor puts a scope in your mouth and moves it gently through the stomach and into the ducts from the pancreas and gallbladder. The doctor is then able to see a stone and remove it. Sometimes the gallbladder, which makes gallstones, needs to be removed by surgery. People with pancreatitis often need a lot of fluid to help support their other organs and their blood pressure. They get fluids through a vein (intravenous, or IV). Instead of food by mouth, nutrition is sometimes given through a vein while the pancreas heals. Follow-up care is a key part of your treatment and safety. Be sure to make and go to all appointments, and call your doctor if you are having problems. It's also a good idea to know your test results and keep a list of the medicines you take. Where can you learn more?   Go to http://margaux.info/. Enter I473 in the search box to learn more about \"Learning About Acute Pancreatitis. \"  Current as of: November 7, 2018  Content Version: 12.2  © 4319-7502 [employer]. Care instructions adapted under license by [employer] (which disclaims liability or warranty for this information). If you have questions about a medical condition or this instruction, always ask your healthcare professional. Stephany Oas any warranty or liability for your use of this information.

## 2020-02-27 NOTE — PROGRESS NOTES
Subjective:     Chief Complaint   Patient presents with   [facility] Follow Up        HPI:  46 y.o.  presents for follow up appointment after hospitalization at [facility]. Admission Date: 2/18/2020   Discharge Date: 2/20/2020    PRIMARY DISCHARGE DIAGNOSIS  Principal Problem:  Acute pancreatitis POA: Yes  Active Problems:  Abnormal LFTs POA: Yes  GERD (gastroesophageal reflux disease) POA: Yes  Hyponatremia POA: Yes      CT ABDOMEN AND PELVIS W CONTRAST  COMPLETED DATE/TIME:  2/18/2020 5:50 pm    COMPARISON:  None    TECHNIQUE:  Multiple axial CT images of the abdomen and pelvis are submitted for  interpretation. IV contrast utilized. Coronal and sagittal reformatted  images are furnished. REPORT:  The partially visualized heart is normal. The partially visualized lungs  are normal.    The abdominal aorta is normal. Minor atherosclerotic vascular  calcification in the tributaries. No dissection or aneurysm. The stomach  and duodenum are free of intrinsic findings. No major splenic findings. No adrenal findings. Cortical cysts are seen in the kidneys. No masses or  hydronephrosis. No gallbladder findings. Hepatic steatosis. Significant edema inflammatory changes seen about the entire pancreas, with  edema and inflammatory changes extending into the left upper quadrant fat. No specific pancreatic mass, ductal dilation or stones seen. No common  bile duct dilation. Normal bladder. No major prostatic findings. Minimal fluid seen in the  pelvis. No major bowel findings. Some gas is seen in the subcutaneous fat  of the lower abdomen which may be due to injections or testing. No free  air. No suspicious adenopathy. No major osseous pathology. Impression:   1. There is prominent inflammation about the pancreas. This suggest acute  pancreatitis. No specific pancreatic masses are seen, and the duct is not  dilated. No necrosis of the pancreas. No stones in the pancreatic duct or  common bile duct.     2. Hepatic steatosis. A few cortical cysts seen in the kidneys. No other  major findings. Viviana.Gr[age] y.o.male with a past medical history significant for previous alcohol abuse with episodes of pancreatitis. He also has a history of GERD and is a current smoker. On 02/18/2020 he presents to the emergency department at our facility with complaints of nausea, vomiting, abdominal pain. Further workup in the emergency department revealed that patient did have elevated lipase of 179. He did have electrolyte imbalance with hyponatremia, hypokalemia. CT of the abdomen was consistent with acute pancreatitis. AST and ALT were elevated to 209 and 109 respectively. Patient was referred to the hospitalist service for further management. In the course of his time at our facility the patient was initially kept NPO, abdominal pain and nausea improved. Patient was continued on IV hydration. Patient was then started on clear liquid diet which was advanced to full liquid diet which she was able to tolerate. Patient did endorse that he had not had pancreatitis since quitting consumption of alcoholic beverages, he was drinking an energy drink at home and did not realize that it was an alcoholic beverage. The patient showed steady improvements was eventually able to tolerate a regular diet. At the time of discharge vital signs were reviewed and found within normal limits as were his laboratory values. Physical exam was unremarkable, abdomen soft, nontender, nondistended with positive bowel sounds. Patient was discharged home in stable condition, patient did state that he had plenty of Zofran at home not needing a new prescription, he was given prescription for Percocet for pain. He was discharged in stable condition\"       He notes that he \"accidentally drank alcohol, it was not intentional.  My miguelina gave me what I thought was a  energy drink that had alcohol in it and my stomach started acting funny immediately\".   Says that he had not drank any alcohol for at least 6-7 months prior to this. Since discharge, eating and drinking well. No N/V/D. Pain is resolved now. Not taking supplemental potassium, eating bananas daily. Feeling a little achy but otherwise he has no complaints. No hospital, ER or specialist visits since last primary care visit except as noted above. Past Medical History:   Diagnosis Date    Anemia     4/3/19    Bleeding hemorrhoid     Elevated cholesterol     Hypertension     Pancreatitis        Social History     Tobacco Use    Smoking status: Current Every Day Smoker     Packs/day: 0.25    Smokeless tobacco: Current User   Substance Use Topics    Alcohol use: Yes     Comment: one beer per day    Drug use: Yes     Types: Marijuana       Outpatient Medications Marked as Taking for the 2/27/20 encounter (Office Visit) with Roselia Stanton NP   Medication Sig Dispense Refill    pantoprazole (PROTONIX) 40 mg tablet TAKE 1 TABLET BY MOUTH DAILY FOR STOMACH ACID 90 Tab 3    ondansetron (ZOFRAN ODT) 8 mg disintegrating tablet Take 1 Tab by mouth every eight (8) hours as needed for Nausea. 30 Tab 2    ondansetron hcl (ZOFRAN) 4 mg tablet TAKE 1 TABLET BY MOUTH EVERY EIGHT (8) HOURS AS NEEDED FOR NAUSEA. 30 Tab 0       Allergies   Allergen Reactions    Peanut Rash     Face swelling, scratchy throat, rash       Health Maintenance reviewed      ROS:  Gen: no fatigue, no fever, no chills, no unexplained weight loss or weight gain  Eyes: no excessive tearing, itching, or discharge  Nose: no rhinorrhea, no sinus pain  Mouth: no oral lesions, no sore throat, no difficulty swallowing  Resp: no shortness of breath, no wheezing, no cough  CV: no chest pain, no orthopnea, no paroxysmal nocturnal dyspnea, no lower extremity edema, no palpitations  Abd: no nausea, no heartburn, no diarrhea, no constipation, no abdominal pain  Neuro: no headaches, no syncope or presyncopal episodes  Endo: no polyuria, no polydipsia. : no hematuria, no dysuria, no frequency, no incontinence  Heme: no lymphadenopathy, no easy bruising or bleeding, no night sweats  MSK: no joint pain or swelling    PE:  Visit Vitals  /80 (BP 1 Location: Left arm, BP Patient Position: Sitting)   Pulse 78   Temp 98.2 °F (36.8 °C) (Oral)   Resp 16   Ht 5' 10\" (1.778 m)   Wt 135 lb (61.2 kg)   SpO2 96%   BMI 19.37 kg/m²     Gen: alert, oriented, no acute distress  Head: normocephalic, atraumatic  Eyes: pupils equal round reactive to light, sclera clear, conjunctiva clear  Oral: moist mucus membranes, no oral lesions, no pharyngeal inflammation or exudate  Neck: symmetric normal sized thyroid, no carotid bruits, no jugular vein distention  Resp: no increase work of breathing, lungs clear to ausculation bilaterally, no wheezing, rales or rhonchi  CV: S1, S2 normal.  No murmurs, rubs, or gallops. Abd: soft, not tender, not distended. No hepatosplenomegaly. Normal bowel sounds. No hernias. No abdominal or renal bruits. Neuro: cranial nerves intact, normal strength and movement in all extremities, and sensation intact and symmetric. Skin: no lesion or rash  Extremities: no cyanosis or edema    No results found for this visit on 02/27/20. Assessment/Plan:  Differential diagnosis and treatment options reviewed with patient who is in agreement with treatment plan as outlined below. ICD-10-CM ICD-9-CM    1. Hospital discharge follow-up Z09 V67.59    2. Alcohol-induced acute pancreatitis, unspecified complication status F32.70 577.0 LIPASE      CBC WITH AUTOMATED DIFF   3. Elevated LFTs R94.5 790.6 HEPATIC FUNCTION PANEL      METABOLIC PANEL, BASIC   4. Hypokalemia G54.6 945.4 METABOLIC PANEL, BASIC       Pancreatitis seems to be resolving. Repeat labs today. Consider GI referral if recurrence. Avoid ETOH  Discussed BMI and healthy weight.  Encouraged patient to work to implement changes including diet high in raw fruits and vegetables, lean protein and good fats. Limit refined, processed carbohydrates and sugar. Encouraged regular exercise. Recommended regular cardiovascular exercise 3-6 times per week for 30-60 minutes daily. I have discussed the diagnosis with the patient and the intended plan as seen in the above orders. The patient has received an after-visit summary and questions were answered concerning future plans. I have discussed medication side effects and warnings with the patient as well. The patient verbalizes understanding and agreement with the plan.

## 2020-02-27 NOTE — PROGRESS NOTES
Chief Complaint   Patient presents with   [facility] Follow Up     1. Have you been to the ER, urgent care clinic since your last visit? Hospitalized since your last visit? Yes When: Pt was in the hospital for 3 days for pancreatitis     2. Have you seen or consulted any other health care providers outside of the [address] [place] [address] since your last visit? Include any pap smears or colon screening. No    Health maintenance reviewed. Pt informed of health maintenance past due and/or upcoming. Pt verbalized understanding. Health Maintenance Due   Topic Date Due    Pneumococcal 0-64 years (1 of 1 - PPSV23) 09/21/1987    DTaP/Tdap/Td series (1 - Tdap) 09/21/1992    Influenza Age 5 to Adult  08/01/2019     Pt is not fasting this visit.

## 2020-02-28 LAB
ALBUMIN SERPL-MCNC: 3.7 G/DL (ref 4–5)
ALP SERPL-CCNC: 85 IU/L (ref 39–117)
ALT SERPL-CCNC: 23 IU/L (ref 0–44)
AST SERPL-CCNC: 39 IU/L (ref 0–40)
BASOPHILS # BLD AUTO: 0 X10E3/UL (ref 0–0.2)
BASOPHILS NFR BLD AUTO: 0 %
BILIRUB DIRECT SERPL-MCNC: 0.08 MG/DL (ref 0–0.4)
BILIRUB SERPL-MCNC: <0.2 MG/DL (ref 0–1.2)
BUN SERPL-MCNC: 5 MG/DL (ref 6–20)
BUN/CREAT SERPL: 6 (ref 9–20)
CALCIUM SERPL-MCNC: 9.5 MG/DL (ref 8.7–10.2)
CHLORIDE SERPL-SCNC: 101 MMOL/L (ref 96–106)
CO2 SERPL-SCNC: 21 MMOL/L (ref 20–29)
CREAT SERPL-MCNC: 0.83 MG/DL (ref 0.76–1.27)
EOSINOPHIL # BLD AUTO: 0.1 X10E3/UL (ref 0–0.4)
EOSINOPHIL NFR BLD AUTO: 1 %
ERYTHROCYTE [DISTWIDTH] IN BLOOD BY AUTOMATED COUNT: 19.7 % (ref 11.6–15.4)
GLUCOSE SERPL-MCNC: 112 MG/DL (ref 65–99)
HCT VFR BLD AUTO: 33.3 % (ref 37.5–51)
HGB BLD-MCNC: 10.6 G/DL (ref 13–17.7)
IMM GRANULOCYTES # BLD AUTO: 0.1 X10E3/UL (ref 0–0.1)
IMM GRANULOCYTES NFR BLD AUTO: 2 %
LIPASE SERPL-CCNC: 453 U/L (ref 13–78)
LYMPHOCYTES # BLD AUTO: 1.1 X10E3/UL (ref 0.7–3.1)
LYMPHOCYTES NFR BLD AUTO: 23 %
MCH RBC QN AUTO: 28.3 PG (ref 26.6–33)
MCHC RBC AUTO-ENTMCNC: 31.8 G/DL (ref 31.5–35.7)
MCV RBC AUTO: 89 FL (ref 79–97)
MONOCYTES # BLD AUTO: 0.3 X10E3/UL (ref 0.1–0.9)
MONOCYTES NFR BLD AUTO: 6 %
NEUTROPHILS # BLD AUTO: 3.3 X10E3/UL (ref 1.4–7)
NEUTROPHILS NFR BLD AUTO: 68 %
PLATELET # BLD AUTO: 255 X10E3/UL (ref 150–450)
POTASSIUM SERPL-SCNC: 4.8 MMOL/L (ref 3.5–5.2)
PROT SERPL-MCNC: 6.5 G/DL (ref 6–8.5)
RBC # BLD AUTO: 3.75 X10E6/UL (ref 4.14–5.8)
SODIUM SERPL-SCNC: 137 MMOL/L (ref 134–144)
WBC # BLD AUTO: 4.9 X10E3/UL (ref 3.4–10.8)

## 2020-03-02 DIAGNOSIS — K85.20 ALCOHOL-INDUCED ACUTE PANCREATITIS, UNSPECIFIED COMPLICATION STATUS: Primary | ICD-10-CM

## 2020-03-02 DIAGNOSIS — D64.9 ANEMIA, UNSPECIFIED TYPE: ICD-10-CM

## 2020-03-02 NOTE — PROGRESS NOTES
Sent to [facility]:  Still a little anemic and lipase is still elevated. Should recheck these levels in 2 weeks. May need to see GI specialist if levels are not improving. Potassium is normal now. Let me know if you have any questions.   [facility] NP

## 2020-07-20 RX ORDER — ONDANSETRON 8 MG/1
8 TABLET, ORALLY DISINTEGRATING ORAL
Qty: 30 TAB | Refills: 0 | Status: SHIPPED | OUTPATIENT
Start: 2020-07-20 | End: 2020-09-29

## 2020-07-20 NOTE — TELEPHONE ENCOUNTER
Requested Prescriptions     Pending Prescriptions Disp Refills    ondansetron (ZOFRAN ODT) 8 mg disintegrating tablet 30 Tab 2     Sig: Take 1 Tab by mouth every eight (8) hours as needed for Nausea.      [address]

## 2020-09-28 NOTE — LETTER
10/2/2020 Lauren Calzada [address] [facility] Rosie [facility]ws [facility] Dear Mr. Lauren Calzada, 
 
Med Refill Appointment Needed: We have received a medication renewal request for you but have been unable to reach you by phone to discuss this further. Without office visits and appropriate monitoring, your healthcare provider cannot be sure that the medication they are prescribing is treating your conditions effectively. Please contact the office at the number above to schedule a follow up visit. Sincerely, Ranulfo Noland MD  
 
Be aware that although these visits are important to keeping you well, your visit may be subject to fees such as co-pays, deductibles, etc.  Please contact your insurance carrier for your specific plan details if you are unsure.

## 2020-09-29 RX ORDER — ONDANSETRON 8 MG/1
TABLET, ORALLY DISINTEGRATING ORAL
Qty: 30 TAB | Refills: 0 | Status: SHIPPED | OUTPATIENT
Start: 2020-09-29 | End: 2021-10-05 | Stop reason: SDUPTHER

## 2021-01-08 DIAGNOSIS — R10.13 EPIGASTRIC PAIN: ICD-10-CM

## 2021-01-08 RX ORDER — PANTOPRAZOLE SODIUM 40 MG/1
TABLET, DELAYED RELEASE ORAL
Qty: 30 TAB | Refills: 0 | OUTPATIENT
Start: 2021-01-08

## 2021-08-13 ENCOUNTER — PATIENT OUTREACH (OUTPATIENT)
Dept: CASE MANAGEMENT | Age: 41
End: 2021-08-13

## 2021-08-13 NOTE — PROGRESS NOTES
Ambulatory Care Management Note    Date/Time:  8/13/2021 3:59 PM    This patient was received as a referral from [facility]; admission to [facility] 8/9-8/10/21 with diagnosis of acute pancreatitis without necrosis or infection. Ambulatory Care Manager outreached to patient today to offer care management services; unable to reach patient.

## 2021-08-23 ENCOUNTER — OFFICE VISIT (OUTPATIENT)
Dept: FAMILY MEDICINE CLINIC | Age: 41
End: 2021-08-23
Payer: COMMERCIAL

## 2021-08-23 VITALS
DIASTOLIC BLOOD PRESSURE: 75 MMHG | SYSTOLIC BLOOD PRESSURE: 114 MMHG | WEIGHT: 136.2 LBS | BODY MASS INDEX: 19.5 KG/M2 | RESPIRATION RATE: 17 BRPM | TEMPERATURE: 97.9 F | HEART RATE: 84 BPM | HEIGHT: 70 IN | OXYGEN SATURATION: 98 %

## 2021-08-23 DIAGNOSIS — K62.89 ANAL PAIN: ICD-10-CM

## 2021-08-23 DIAGNOSIS — K85.90 ACUTE PANCREATITIS, UNSPECIFIED COMPLICATION STATUS, UNSPECIFIED PANCREATITIS TYPE: Primary | ICD-10-CM

## 2021-08-23 DIAGNOSIS — R79.89 ABNORMAL LFTS: ICD-10-CM

## 2021-08-23 DIAGNOSIS — D64.9 ACUTE ANEMIA: ICD-10-CM

## 2021-08-23 PROCEDURE — 99214 OFFICE O/P EST MOD 30 MIN: CPT | Performed by: FAMILY MEDICINE

## 2021-08-23 RX ORDER — ONDANSETRON 4 MG/1
TABLET, ORALLY DISINTEGRATING ORAL
COMMUNITY
Start: 2021-08-10 | End: 2021-08-17

## 2021-08-23 RX ORDER — FENOFIBRATE 145 MG/1
145 TABLET, COATED ORAL DAILY
COMMUNITY
Start: 2021-08-10 | End: 2022-09-09 | Stop reason: SDUPTHER

## 2021-08-23 NOTE — PROGRESS NOTES
CECILIA Lewis is a 45 y.o. male who presents to follow-up hospital stay [facility] 8/9-8/10 for hypertriglyceridemia induced acute pancreatitis. He was treated with an insulin drip in the ICU, transferred to the floor after his triglyceride level was less than 500. Started fenofibrate 145 mg and advised to follow-up with PCP in 2 weeks. It was recommended he avoid fats in his diet    Labs reviewed. Hemoglobin appears to be around 10 although it varied wildly in the hospital probably due to dilution  Triglycerides at discharge 402  This hyponatremic  Creatinine 0.63  Glucose 115    CT abdomen \"Pancreatic tail is enlarged with prominent peripancreatic inflammatory   stranding and moderate peripancreatic free fluid as discussed consistent with   acute pancreatitis. Evaluation for pancreatic hypoenhancement to suggest   necrotizing pancreatitis limited secondary to lack of venous phase imaging. \"    Patient is not sure what what brought on this bout of pancreatitis. Used to drink heavily and has had alcohol induced pancreatitis in the past but is not drinking as heavily although he does still consume 1-2 beers on occasion. He had just gotten back from a trip to [place] where he consumed large quantities of seafood. Some of this was fried. His sugar intake has gone up a lot. Feels like he is craving sugar for the last 6 months    PMHx:  Past Medical History:   Diagnosis Date    Anemia     4/3/19    Bleeding hemorrhoid     Elevated cholesterol     Hypertension     Pancreatitis        Meds:   Current Outpatient Medications   Medication Sig Dispense Refill    fenofibrate nanocrystallized (TRICOR) 145 mg tablet Take 145 mg by mouth daily.       ondansetron (ZOFRAN ODT) 8 mg disintegrating tablet DISSOLVE 1 TABLET IN MOUTH EVERY 8 HOURS AS NEEDED FOR NAUSEA 30 Tab 0    pantoprazole (PROTONIX) 40 mg tablet TAKE 1 TABLET BY MOUTH DAILY FOR STOMACH ACID (Patient not taking: Reported on 8/23/2021) 90 Tab 3    ondansetron hcl (ZOFRAN) 4 mg tablet TAKE 1 TABLET BY MOUTH EVERY EIGHT (8) HOURS AS NEEDED FOR NAUSEA. 30 Tab 0       Allergies: Allergies   Allergen Reactions    Peanut Rash     Face swelling, scratchy throat, rash       Smoker:  Social History     Tobacco Use   Smoking Status Current Every Day Smoker    Packs/day: 0.25    Types: Cigarettes   Smokeless Tobacco Never Used       ETOH:   Social History     Substance and Sexual Activity   Alcohol Use Yes    Comment: one beer per day       FH:   Family History   Problem Relation Age of Onset    Hypertension Mother     Breast Cancer Mother     Hypertension Father     Heart Attack Father     No Known Problems Sister        ROS:   As listed in HPI. In addition:  Constitutional:   No headache, fever, fatigue, weight loss or weight gain      Cardiac:    No chest pain      Resp:   No cough or shortness of breath      Neuro   No loss of consciousness, dizziness, seizures      Physical Exam:  Blood pressure 114/75, pulse 84, temperature 97.9 °F (36.6 °C), temperature source Temporal, resp. rate 17, height 5' 10\" (1.778 m), weight 136 lb 3.2 oz (61.8 kg), SpO2 98 %. GEN: No apparent distress. Alert and oriented and responds to all questions appropriately. NEUROLOGIC:  No focal neurologic deficits. Strength and sensation grossly intact. Coordination and gait grossly intact. EXT: Well perfused. No edema. SKIN: No obvious rashes. Lungs clear to auscultation bilaterally  CV regular rhythm no murmur       Assessment and Plan     Triglyceride induced pancreatitis  Consider even modest alcohol intake given his history of alcoholic pancreatitis. Advised him to cut back/cut out alcohol  Decrease fatty food intake  Consider IGT. Check A1c    Hypertriglyceridemia  Tolerating TriCor  Lipid panel.     Anal pain  Recognizes some of this as his hemorrhoidal itching but apparently has had a feeling of sharp \"cinching\" pain with bowel movement he feels will cut his bowel movements short. Consider fissure. Refer to colorectal for eval. He wanted to get the hemorrhoids removed      ICD-10-CM ICD-9-CM    1. Acute pancreatitis, unspecified complication status, unspecified pancreatitis type  K85.90 577.0 LIPID PANEL      CBC WITH AUTOMATED DIFF      METABOLIC PANEL, COMPREHENSIVE      HEMOGLOBIN A1C WITH EAG      LIPID PANEL      CBC WITH AUTOMATED DIFF      METABOLIC PANEL, COMPREHENSIVE      HEMOGLOBIN A1C WITH EAG   2. Anal pain  K62.89 569.42 REFERRAL TO COLON AND RECTAL SURGERY   3. Abnormal LFTs  R94.5 790.6 LIPID PANEL      CBC WITH AUTOMATED DIFF      METABOLIC PANEL, COMPREHENSIVE      HEMOGLOBIN A1C WITH EAG      LIPID PANEL      CBC WITH AUTOMATED DIFF      METABOLIC PANEL, COMPREHENSIVE      HEMOGLOBIN A1C WITH EAG   4. Acute anemia  D64.9 285.9 LIPID PANEL      CBC WITH AUTOMATED DIFF      METABOLIC PANEL, COMPREHENSIVE      HEMOGLOBIN A1C WITH EAG      LIPID PANEL      CBC WITH AUTOMATED DIFF      METABOLIC PANEL, COMPREHENSIVE      HEMOGLOBIN A1C WITH EAG       AVS given.  Pt expressed understanding of instructions

## 2021-08-23 NOTE — PROGRESS NOTES
1. Have you been to the ER, urgent care clinic since your last visit? Hospitalized since your last visit? Yes.  [facility] [place]    2. Have you seen or consulted any other health care providers outside of the [address] [place] [address] since your last visit? Include any pap smears or colon screening.  No     Health Maintenance Due   Topic Date Due    Hepatitis C Screening  Never done    Pneumococcal 0-64 years (1 of 2 - PPSV23) Never done    DTaP/Tdap/Td series (1 - Tdap) Never done

## 2021-08-24 LAB
ALBUMIN SERPL-MCNC: 4.1 G/DL (ref 4–5)
ALBUMIN/GLOB SERPL: 1.5 {RATIO} (ref 1.2–2.2)
ALP SERPL-CCNC: 70 IU/L (ref 48–121)
ALT SERPL-CCNC: 9 IU/L (ref 0–44)
AST SERPL-CCNC: 22 IU/L (ref 0–40)
BASOPHILS # BLD AUTO: 0 X10E3/UL (ref 0–0.2)
BASOPHILS NFR BLD AUTO: 1 %
BILIRUB SERPL-MCNC: <0.2 MG/DL (ref 0–1.2)
BUN SERPL-MCNC: 10 MG/DL (ref 6–20)
BUN/CREAT SERPL: 12 (ref 9–20)
CALCIUM SERPL-MCNC: 8.7 MG/DL (ref 8.7–10.2)
CHLORIDE SERPL-SCNC: 101 MMOL/L (ref 96–106)
CHOLEST SERPL-MCNC: 157 MG/DL (ref 100–199)
CO2 SERPL-SCNC: 25 MMOL/L (ref 20–29)
CREAT SERPL-MCNC: 0.83 MG/DL (ref 0.76–1.27)
EOSINOPHIL # BLD AUTO: 0.1 X10E3/UL (ref 0–0.4)
EOSINOPHIL NFR BLD AUTO: 1 %
ERYTHROCYTE [DISTWIDTH] IN BLOOD BY AUTOMATED COUNT: 25.1 % (ref 11.6–15.4)
EST. AVERAGE GLUCOSE BLD GHB EST-MCNC: 103 MG/DL
GLOBULIN SER CALC-MCNC: 2.7 G/DL (ref 1.5–4.5)
GLUCOSE SERPL-MCNC: 92 MG/DL (ref 65–99)
HBA1C MFR BLD: 5.2 % (ref 4.8–5.6)
HCT VFR BLD AUTO: 25.3 % (ref 37.5–51)
HDLC SERPL-MCNC: 39 MG/DL
HGB BLD-MCNC: 7.3 G/DL (ref 13–17.7)
IMM GRANULOCYTES # BLD AUTO: 0 X10E3/UL (ref 0–0.1)
IMM GRANULOCYTES NFR BLD AUTO: 1 %
IMP & REVIEW OF LAB RESULTS: NORMAL
LDLC SERPL CALC-MCNC: 83 MG/DL (ref 0–99)
LYMPHOCYTES # BLD AUTO: 1.8 X10E3/UL (ref 0.7–3.1)
LYMPHOCYTES NFR BLD AUTO: 28 %
MCH RBC QN AUTO: 22.5 PG (ref 26.6–33)
MCHC RBC AUTO-ENTMCNC: 28.9 G/DL (ref 31.5–35.7)
MCV RBC AUTO: 78 FL (ref 79–97)
MONOCYTES # BLD AUTO: 0.4 X10E3/UL (ref 0.1–0.9)
MONOCYTES NFR BLD AUTO: 6 %
MORPHOLOGY BLD-IMP: ABNORMAL
NEUTROPHILS # BLD AUTO: 4.2 X10E3/UL (ref 1.4–7)
NEUTROPHILS NFR BLD AUTO: 63 %
PLATELET # BLD AUTO: 349 X10E3/UL (ref 150–450)
POTASSIUM SERPL-SCNC: 4.1 MMOL/L (ref 3.5–5.2)
PROT SERPL-MCNC: 6.8 G/DL (ref 6–8.5)
RBC # BLD AUTO: 3.25 X10E6/UL (ref 4.14–5.8)
SODIUM SERPL-SCNC: 139 MMOL/L (ref 134–144)
SPECIMEN STATUS REPORT, ROLRST: NORMAL
TRIGL SERPL-MCNC: 205 MG/DL (ref 0–149)
VLDLC SERPL CALC-MCNC: 35 MG/DL (ref 5–40)
WBC # BLD AUTO: 6.6 X10E3/UL (ref 3.4–10.8)

## 2021-08-25 ENCOUNTER — TELEPHONE (OUTPATIENT)
Dept: FAMILY MEDICINE CLINIC | Age: 41
End: 2021-08-25

## 2021-08-25 NOTE — TELEPHONE ENCOUNTER
Message from [facility]    Dr. Sarina Anderson  Received: Cortez Carranza, [facility]  General Message/Vendor Calls     Caller's first and last name: Geovany Morris ~  with [employer]       Reason for call: Adarsh Khan the Pt is in their program       Callback required yes/no and why:  Yes       Best contact number(s):  [phone] ext [phone]       Details to clarify the request: Aleena Bonds. is requesting a callback to discuss further the Pt participating in their program.  Pt was released from the hospital recently for pancreatitis, and Sharon Roberts wants to follow up with Dr. Debi Albrecht about the Pt healthcare.      Thanks,   Tereza Cristobal

## 2021-08-27 ENCOUNTER — TELEPHONE (OUTPATIENT)
Dept: FAMILY MEDICINE CLINIC | Age: 41
End: 2021-08-27

## 2021-08-27 DIAGNOSIS — D64.9 ANEMIA, UNSPECIFIED TYPE: Primary | ICD-10-CM

## 2021-08-28 NOTE — TELEPHONE ENCOUNTER
Cholesterol looks a lot better. Kidney function looks okay, liver is not inflamed    Anemic, hemoglobin is 7.3, about half what it should be. You are right on the border of needing a transfusion. This is probably causing a sense of fatigue. This is not too far off of where your blood counts were in the hospital but it is lower and that is unexpected.     We should repeat these labs the week of 8/30 and add a few more labs to check for cause of anemia including a stool sample to check for blood loss in the GI tract

## 2021-08-30 ENCOUNTER — TELEPHONE (OUTPATIENT)
Dept: FAMILY MEDICINE CLINIC | Age: 41
End: 2021-08-30

## 2021-08-30 NOTE — TELEPHONE ENCOUNTER
Boyd Hickman called back regarding patient.  Please give Boyd Hickman a call back, she is returning phone call to patient    [facility]# [phone]

## 2021-08-31 NOTE — TELEPHONE ENCOUNTER
----- Message from Eizo Elizabeth sent at 8/31/2021  9:39 AM EDT -----  Regarding: Dr. Sharon Koch  Patient return call    Caller's first and last name and relationship (if not the patient): Willymaria e Murphy contact number(s): [phone]      Whose call is being returned: Candice Okeefe      Details to clarify the request: About lab results.        Ezio Elizabeth

## 2021-09-01 NOTE — TELEPHONE ENCOUNTER
Patient verbalizes understanding and has an appointment for labs tomorrow at 1pm. He understands the information relayed to him and will comply.

## 2021-09-02 ENCOUNTER — APPOINTMENT (OUTPATIENT)
Dept: FAMILY MEDICINE CLINIC | Age: 41
End: 2021-09-02

## 2021-09-02 DIAGNOSIS — R10.13 EPIGASTRIC PAIN: ICD-10-CM

## 2021-09-02 NOTE — TELEPHONE ENCOUNTER
Requested Prescriptions     Pending Prescriptions Disp Refills    pantoprazole (PROTONIX) 40 mg tablet 90 Tablet 3    ferrous sulfate (Iron) 325 mg (65 mg iron) tablet       Sig: Take 1 Tablet by mouth every fourty-eight (48) hours. Last office visit: 8/23/2021    Pt states that he felt rejuvenated after taking the iron pill and would like to continue the Rx.

## 2021-09-03 LAB
BASOPHILS # BLD AUTO: 0 X10E3/UL (ref 0–0.2)
BASOPHILS NFR BLD AUTO: 1 %
EOSINOPHIL # BLD AUTO: 0.1 X10E3/UL (ref 0–0.4)
EOSINOPHIL NFR BLD AUTO: 1 %
ERYTHROCYTE [DISTWIDTH] IN BLOOD BY AUTOMATED COUNT: 23.8 % (ref 11.6–15.4)
FERRITIN SERPL-MCNC: 13 NG/ML (ref 30–400)
FOLATE SERPL-MCNC: 17.6 NG/ML
HCT VFR BLD AUTO: 26.6 % (ref 37.5–51)
HGB BLD-MCNC: 7.6 G/DL (ref 13–17.7)
IMM GRANULOCYTES # BLD AUTO: 0 X10E3/UL (ref 0–0.1)
IMM GRANULOCYTES NFR BLD AUTO: 1 %
IRON SATN MFR SERPL: 9 % (ref 15–55)
IRON SERPL-MCNC: 51 UG/DL (ref 38–169)
LYMPHOCYTES # BLD AUTO: 1.3 X10E3/UL (ref 0.7–3.1)
LYMPHOCYTES NFR BLD AUTO: 29 %
MCH RBC QN AUTO: 21.5 PG (ref 26.6–33)
MCHC RBC AUTO-ENTMCNC: 28.6 G/DL (ref 31.5–35.7)
MCV RBC AUTO: 75 FL (ref 79–97)
MONOCYTES # BLD AUTO: 0.3 X10E3/UL (ref 0.1–0.9)
MONOCYTES NFR BLD AUTO: 6 %
MORPHOLOGY BLD-IMP: ABNORMAL
NEUTROPHILS # BLD AUTO: 2.7 X10E3/UL (ref 1.4–7)
NEUTROPHILS NFR BLD AUTO: 62 %
PLATELET # BLD AUTO: 262 X10E3/UL (ref 150–450)
RBC # BLD AUTO: 3.54 X10E6/UL (ref 4.14–5.8)
RETICS/RBC NFR AUTO: 1.4 % (ref 0.6–2.6)
TIBC SERPL-MCNC: 549 UG/DL (ref 250–450)
UIBC SERPL-MCNC: 498 UG/DL (ref 111–343)
VIT B12 SERPL-MCNC: 349 PG/ML (ref 232–1245)
WBC # BLD AUTO: 4.3 X10E3/UL (ref 3.4–10.8)

## 2021-09-03 RX ORDER — PANTOPRAZOLE SODIUM 40 MG/1
40 TABLET, DELAYED RELEASE ORAL DAILY
Qty: 90 TABLET | Refills: 3 | Status: SHIPPED | OUTPATIENT
Start: 2021-09-03 | End: 2022-07-29

## 2021-09-03 RX ORDER — LANOLIN ALCOHOL/MO/W.PET/CERES
325 CREAM (GRAM) TOPICAL
Qty: 45 TABLET | Refills: 3 | Status: SHIPPED | OUTPATIENT
Start: 2021-09-03

## 2021-09-08 ENCOUNTER — TELEPHONE (OUTPATIENT)
Dept: FAMILY MEDICINE CLINIC | Age: 41
End: 2021-09-08

## 2021-09-08 NOTE — TELEPHONE ENCOUNTER
Labs reviewed. Hemoglobin stable but still anemic at 7.6. Profoundly iron deficient. He does not have enough iron to make new blood. The iron pill might help but he can get a iron infusion for quicker relief if he chooses. Let me know if he would like iron infusion and I will order. Does he have the fit kit? Has it been sent in yet?   There is still a question of whether he lost blood in stool

## 2021-09-08 NOTE — TELEPHONE ENCOUNTER
Stacey Ivory called regarding patient.  She is calling regarding test results    Please give a call back  [facility]# [phone]

## 2021-09-08 NOTE — TELEPHONE ENCOUNTER
verified. Pt agrees to the iron infusion. He will send off for stool test, but is currently having diarrhea.

## 2021-09-23 ENCOUNTER — PATIENT OUTREACH (OUTPATIENT)
Dept: CASE MANAGEMENT | Age: 41
End: 2021-09-23

## 2021-09-23 NOTE — PROGRESS NOTES
9/23/2021  11:22 AM    Second patient outreach attempt by this [facility] to offer care management services; unable to reach patient.

## 2021-10-05 RX ORDER — ONDANSETRON 8 MG/1
TABLET, ORALLY DISINTEGRATING ORAL
Qty: 30 TABLET | Refills: 2 | Status: SHIPPED | OUTPATIENT
Start: 2021-10-05 | End: 2022-05-05

## 2021-10-05 NOTE — TELEPHONE ENCOUNTER
Wife is calling for a refill on med    Requested Prescriptions     Pending Prescriptions Disp Refills    ondansetron (ZOFRAN ODT) 8 mg disintegrating tablet 30 Tablet 0

## 2021-10-07 ENCOUNTER — PATIENT OUTREACH (OUTPATIENT)
Dept: CASE MANAGEMENT | Age: 41
End: 2021-10-07

## 2021-10-07 NOTE — PROGRESS NOTES
10/7/2021  3:20 PM    Third patient outreach attempt by this [facility] to offer care management services. Two patient identifiers verified. Patient reports that he is currently at work for at least another thirty minutes; requests ACM outreach at a later time.

## 2021-10-12 ENCOUNTER — PATIENT OUTREACH (OUTPATIENT)
Dept: CASE MANAGEMENT | Age: 41
End: 2021-10-12

## 2021-10-13 NOTE — PROGRESS NOTES
10/13/2021  2:55 PM    Patient outreach attempt by this [facility] today to offer care management services; unable to reach patient.

## 2021-11-03 ENCOUNTER — PATIENT OUTREACH (OUTPATIENT)
Dept: CASE MANAGEMENT | Age: 41
End: 2021-11-03

## 2021-11-03 NOTE — PROGRESS NOTES
11/3/2021  4:18 PM     Patient outreach attempt by this ACM today to offer care management services; unable to reach patient. ACM has been unable to reach patient to offer care management services; multiple patient outreach attempts made since referral was received on 8/13/21. Complex Case Management episode resolved at this time due to ACM inability to reach patient.

## 2022-03-18 PROBLEM — D50.9 IRON DEFICIENCY ANEMIA: Status: ACTIVE | Noted: 2017-07-14

## 2022-03-19 PROBLEM — D64.9 ACUTE ANEMIA: Status: ACTIVE | Noted: 2019-04-03

## 2022-03-19 PROBLEM — E78.5 HYPERLIPIDEMIA: Status: ACTIVE | Noted: 2017-07-14

## 2022-03-19 PROBLEM — K21.9 GERD (GASTROESOPHAGEAL REFLUX DISEASE): Status: ACTIVE | Noted: 2019-04-03

## 2022-03-19 PROBLEM — K92.1 HEMATOCHEZIA: Status: ACTIVE | Noted: 2019-04-03

## 2022-03-19 PROBLEM — Z79.01 CURRENT USE OF LONG TERM ANTICOAGULATION: Status: ACTIVE | Noted: 2019-04-03

## 2022-03-19 PROBLEM — Z79.1 NSAID LONG-TERM USE: Status: ACTIVE | Noted: 2019-04-03

## 2022-05-05 RX ORDER — ONDANSETRON 8 MG/1
TABLET, ORALLY DISINTEGRATING ORAL
Qty: 30 TABLET | Refills: 0 | Status: SHIPPED | OUTPATIENT
Start: 2022-05-05 | End: 2022-07-21

## 2022-05-13 ENCOUNTER — TELEPHONE (OUTPATIENT)
Dept: FAMILY MEDICINE CLINIC | Age: 42
End: 2022-05-13

## 2022-05-13 RX ORDER — HYDROXYZINE 25 MG/1
25 TABLET, FILM COATED ORAL
Qty: 30 TABLET | Refills: 2 | Status: SHIPPED | OUTPATIENT
Start: 2022-05-13

## 2022-05-13 NOTE — TELEPHONE ENCOUNTER
Refill Request ([facility] sent fax)     HYDROXYZINE HCL 25 MG TAB  PRES QTY:30   SIG: TAKE 1 TABLET BY MOUTH THREE TIMES DAILY AS NEEDED FOR ITCHING FOR UP TO 10 DAYS     Thank you

## 2022-06-29 ENCOUNTER — HOSPITAL ENCOUNTER (EMERGENCY)
Age: 42
Discharge: HOME | End: 2022-06-29
Attending: STUDENT IN AN ORGANIZED HEALTH CARE EDUCATION/TRAINING PROGRAM
Payer: COMMERCIAL

## 2022-06-29 ENCOUNTER — APPOINTMENT (OUTPATIENT)
Dept: GENERAL RADIOLOGY | Age: 42
End: 2022-06-29
Attending: STUDENT IN AN ORGANIZED HEALTH CARE EDUCATION/TRAINING PROGRAM
Payer: COMMERCIAL

## 2022-06-29 VITALS
RESPIRATION RATE: 18 BRPM | HEIGHT: 70 IN | WEIGHT: 150 LBS | TEMPERATURE: 98 F | HEART RATE: 59 BPM | SYSTOLIC BLOOD PRESSURE: 162 MMHG | BODY MASS INDEX: 21.47 KG/M2 | DIASTOLIC BLOOD PRESSURE: 105 MMHG | OXYGEN SATURATION: 100 %

## 2022-06-29 DIAGNOSIS — K85.90 ACUTE PANCREATITIS WITHOUT INFECTION OR NECROSIS, UNSPECIFIED PANCREATITIS TYPE: Primary | ICD-10-CM

## 2022-06-29 LAB
ALBUMIN SERPL-MCNC: 4.1 G/DL (ref 3.5–5)
ALBUMIN/GLOB SERPL: 1.1 {RATIO} (ref 1.1–2.2)
ALP SERPL-CCNC: 69 U/L (ref 45–117)
ALT SERPL-CCNC: 22 U/L (ref 12–78)
ANION GAP SERPL CALC-SCNC: 9 MMOL/L (ref 5–15)
APPEARANCE UR: CLEAR
AST SERPL-CCNC: 55 U/L (ref 15–37)
ATRIAL RATE: 65 BPM
BACTERIA URNS QL MICRO: NEGATIVE /HPF
BASOPHILS # BLD: 0 K/UL (ref 0–0.1)
BASOPHILS NFR BLD: 0 % (ref 0–1)
BILIRUB SERPL-MCNC: 0.6 MG/DL (ref 0.2–1)
BILIRUB UR QL: NEGATIVE
BNP SERPL-MCNC: 22 PG/ML
BUN SERPL-MCNC: 13 MG/DL (ref 6–20)
BUN/CREAT SERPL: 16 (ref 12–20)
CALCIUM SERPL-MCNC: 9.7 MG/DL (ref 8.5–10.1)
CALCULATED P AXIS, ECG09: 78 DEGREES
CALCULATED R AXIS, ECG10: 68 DEGREES
CALCULATED T AXIS, ECG11: 70 DEGREES
CHLORIDE SERPL-SCNC: 102 MMOL/L (ref 97–108)
CO2 SERPL-SCNC: 24 MMOL/L (ref 21–32)
COLOR UR: ABNORMAL
CREAT SERPL-MCNC: 0.82 MG/DL (ref 0.7–1.3)
DIAGNOSIS, 93000: NORMAL
DIFFERENTIAL METHOD BLD: ABNORMAL
EOSINOPHIL # BLD: 0 K/UL (ref 0–0.4)
EOSINOPHIL NFR BLD: 0 % (ref 0–7)
EPITH CASTS URNS QL MICRO: ABNORMAL /LPF
ERYTHROCYTE [DISTWIDTH] IN BLOOD BY AUTOMATED COUNT: 16.1 % (ref 11.5–14.5)
GLOBULIN SER CALC-MCNC: 3.7 G/DL (ref 2–4)
GLUCOSE SERPL-MCNC: 134 MG/DL (ref 65–100)
GLUCOSE UR STRIP.AUTO-MCNC: NEGATIVE MG/DL
HCT VFR BLD AUTO: 36.9 % (ref 36.6–50.3)
HGB BLD-MCNC: 12.3 G/DL (ref 12.1–17)
HGB UR QL STRIP: NEGATIVE
HYALINE CASTS URNS QL MICRO: ABNORMAL /LPF (ref 0–2)
IMM GRANULOCYTES # BLD AUTO: 0 K/UL (ref 0–0.04)
IMM GRANULOCYTES NFR BLD AUTO: 0 % (ref 0–0.5)
KETONES UR QL STRIP.AUTO: ABNORMAL MG/DL
LEUKOCYTE ESTERASE UR QL STRIP.AUTO: NEGATIVE
LIPASE SERPL-CCNC: 2862 U/L (ref 73–393)
LYMPHOCYTES # BLD: 0.7 K/UL (ref 0.8–3.5)
LYMPHOCYTES NFR BLD: 6 % (ref 12–49)
MCH RBC QN AUTO: 27.9 PG (ref 26–34)
MCHC RBC AUTO-ENTMCNC: 33.3 G/DL (ref 30–36.5)
MCV RBC AUTO: 83.7 FL (ref 80–99)
MONOCYTES # BLD: 0.5 K/UL (ref 0–1)
MONOCYTES NFR BLD: 4 % (ref 5–13)
NEUTS SEG # BLD: 10.8 K/UL (ref 1.8–8)
NEUTS SEG NFR BLD: 90 % (ref 32–75)
NITRITE UR QL STRIP.AUTO: NEGATIVE
NRBC # BLD: 0 K/UL (ref 0–0.01)
NRBC BLD-RTO: 0 PER 100 WBC
P-R INTERVAL, ECG05: 158 MS
PH UR STRIP: 5 [PH] (ref 5–8)
PLATELET # BLD AUTO: 276 K/UL (ref 150–400)
PMV BLD AUTO: 10.6 FL (ref 8.9–12.9)
POTASSIUM SERPL-SCNC: 3.9 MMOL/L (ref 3.5–5.1)
PROT SERPL-MCNC: 7.8 G/DL (ref 6.4–8.2)
PROT UR STRIP-MCNC: 30 MG/DL
Q-T INTERVAL, ECG07: 408 MS
QRS DURATION, ECG06: 104 MS
QTC CALCULATION (BEZET), ECG08: 424 MS
RBC # BLD AUTO: 4.41 M/UL (ref 4.1–5.7)
RBC #/AREA URNS HPF: ABNORMAL /HPF (ref 0–5)
RBC MORPH BLD: ABNORMAL
RBC MORPH BLD: ABNORMAL
SODIUM SERPL-SCNC: 135 MMOL/L (ref 136–145)
SP GR UR REFRACTOMETRY: >1.03 (ref 1–1.03)
TROPONIN-HIGH SENSITIVITY: 22 NG/L (ref 0–76)
TROPONIN-HIGH SENSITIVITY: 22 NG/L (ref 0–76)
UROBILINOGEN UR QL STRIP.AUTO: 1 EU/DL (ref 0.2–1)
VENTRICULAR RATE, ECG03: 65 BPM
WBC # BLD AUTO: 12 K/UL (ref 4.1–11.1)
WBC URNS QL MICRO: ABNORMAL /HPF (ref 0–4)

## 2022-06-29 PROCEDURE — 71045 X-RAY EXAM CHEST 1 VIEW: CPT

## 2022-06-29 PROCEDURE — 80053 COMPREHEN METABOLIC PANEL: CPT

## 2022-06-29 PROCEDURE — 36415 COLL VENOUS BLD VENIPUNCTURE: CPT

## 2022-06-29 PROCEDURE — 93005 ELECTROCARDIOGRAM TRACING: CPT

## 2022-06-29 PROCEDURE — 83690 ASSAY OF LIPASE: CPT

## 2022-06-29 PROCEDURE — 74011000250 HC RX REV CODE- 250: Performed by: STUDENT IN AN ORGANIZED HEALTH CARE EDUCATION/TRAINING PROGRAM

## 2022-06-29 PROCEDURE — 96374 THER/PROPH/DIAG INJ IV PUSH: CPT

## 2022-06-29 PROCEDURE — 99285 EMERGENCY DEPT VISIT HI MDM: CPT

## 2022-06-29 PROCEDURE — 84484 ASSAY OF TROPONIN QUANT: CPT

## 2022-06-29 PROCEDURE — 74011250637 HC RX REV CODE- 250/637: Performed by: STUDENT IN AN ORGANIZED HEALTH CARE EDUCATION/TRAINING PROGRAM

## 2022-06-29 PROCEDURE — 85025 COMPLETE CBC W/AUTO DIFF WBC: CPT

## 2022-06-29 PROCEDURE — 74011250636 HC RX REV CODE- 250/636: Performed by: STUDENT IN AN ORGANIZED HEALTH CARE EDUCATION/TRAINING PROGRAM

## 2022-06-29 PROCEDURE — 81001 URINALYSIS AUTO W/SCOPE: CPT

## 2022-06-29 PROCEDURE — 83880 ASSAY OF NATRIURETIC PEPTIDE: CPT

## 2022-06-29 PROCEDURE — 96375 TX/PRO/DX INJ NEW DRUG ADDON: CPT

## 2022-06-29 RX ORDER — ONDANSETRON 4 MG/1
4 TABLET, FILM COATED ORAL
Qty: 10 TABLET | Refills: 0 | Status: SHIPPED | OUTPATIENT
Start: 2022-06-29

## 2022-06-29 RX ORDER — HYDROCODONE BITARTRATE AND ACETAMINOPHEN 5; 325 MG/1; MG/1
1 TABLET ORAL ONCE
Status: COMPLETED | OUTPATIENT
Start: 2022-06-29 | End: 2022-06-29

## 2022-06-29 RX ORDER — MORPHINE SULFATE 2 MG/ML
4 INJECTION, SOLUTION INTRAMUSCULAR; INTRAVENOUS ONCE
Status: COMPLETED | OUTPATIENT
Start: 2022-06-29 | End: 2022-06-29

## 2022-06-29 RX ORDER — HYDROCODONE BITARTRATE AND ACETAMINOPHEN 5; 325 MG/1; MG/1
1 TABLET ORAL
Qty: 8 TABLET | Refills: 0 | Status: SHIPPED | OUTPATIENT
Start: 2022-06-29 | End: 2022-07-01

## 2022-06-29 RX ORDER — FENTANYL CITRATE 50 UG/ML
50 INJECTION, SOLUTION INTRAMUSCULAR; INTRAVENOUS
Status: COMPLETED | OUTPATIENT
Start: 2022-06-29 | End: 2022-06-29

## 2022-06-29 RX ORDER — ONDANSETRON 4 MG/1
4 TABLET, FILM COATED ORAL
Qty: 10 TABLET | Refills: 0 | Status: SHIPPED | OUTPATIENT
Start: 2022-06-29 | End: 2022-06-29 | Stop reason: SDUPTHER

## 2022-06-29 RX ORDER — HYDROCODONE BITARTRATE AND ACETAMINOPHEN 5; 325 MG/1; MG/1
1 TABLET ORAL
Qty: 8 TABLET | Refills: 0 | Status: SHIPPED | OUTPATIENT
Start: 2022-06-29 | End: 2022-06-29 | Stop reason: SDUPTHER

## 2022-06-29 RX ORDER — ONDANSETRON 2 MG/ML
4 INJECTION INTRAMUSCULAR; INTRAVENOUS
Status: COMPLETED | OUTPATIENT
Start: 2022-06-29 | End: 2022-06-29

## 2022-06-29 RX ADMIN — ONDANSETRON 4 MG: 2 INJECTION INTRAMUSCULAR; INTRAVENOUS at 09:31

## 2022-06-29 RX ADMIN — ALUMINUM HYDROXIDE AND MAGNESIUM HYDROXIDE 40 ML: 200; 200 SUSPENSION ORAL at 10:44

## 2022-06-29 RX ADMIN — FENTANYL CITRATE 50 MCG: 50 INJECTION, SOLUTION INTRAMUSCULAR; INTRAVENOUS at 10:43

## 2022-06-29 RX ADMIN — HYDROCODONE BITARTRATE AND ACETAMINOPHEN 1 TABLET: 5; 325 TABLET ORAL at 12:26

## 2022-06-29 RX ADMIN — SODIUM CHLORIDE, PRESERVATIVE FREE 20 MG: 5 INJECTION INTRAVENOUS at 09:33

## 2022-06-29 RX ADMIN — SODIUM CHLORIDE 1000 ML: 9 INJECTION, SOLUTION INTRAVENOUS at 09:32

## 2022-06-29 RX ADMIN — MORPHINE SULFATE 4 MG: 2 INJECTION, SOLUTION INTRAMUSCULAR; INTRAVENOUS at 09:34

## 2022-06-29 NOTE — Clinical Note
[address]  [facility] EMERGENCY DEPT  [address] [facility] [id number] [phone]    Work/School Note    Date: 6/29/2022    To Whom It May concern:    Irene Fine was seen and treated today in the emergency room by the following provider(s):  Attending Provider: Sandford Bosworth, MD.      Irene Fine is excused from work/school on 6/29/2022 through 7/1/2022. He is medically clear to return to work/school on 7/2/2022.          Sincerely,          Mansoor Barnes MD

## 2022-06-29 NOTE — ED NOTES
Pt's significant other agrees to drive. AVS printed and dicussed with pt. Pt educated on discharge instructions, follow up and medication usage. Pt provided with opportunity for questions and concerns. Pt verbalizes understanding d/c instructions. Pt departed ED with all belongings.

## 2022-06-29 NOTE — ED PROVIDER NOTES
EMERGENCY DEPARTMENT HISTORY AND PHYSICAL EXAM      Date: 6/29/2022  Patient Name: Oh Negron    History of Presenting Illness     Chief Complaint   Patient presents with    Vomiting     started at 0400 this AM     Chest Pain     sharp pains that started this morning on the L side, patient has taken 2 325 ASA this AM    Dizziness         HPI: Oh Negron, 37 y.o. male presents to the ED with cc of epigastric pain, chest pain and vomiting. He states that he has been having pain in his epigastric region for the past 2 weeks, however today it got worse, is now radiating up to his chest.  He denies any associated identifiable exacerbating or alleviating factors, the pain is constant. Accompanied by \"nonstop\" nonbloody emesis since this morning. He feels slightly short of breath and sweaty as well. His epigastric discomfort and now chest discomfort started after eating some suspicious meat. No fevers, no cough, no diarrhea. Reports history of pancreatitis in the past, however states that the pain usually is more in the abdomen and not radiating up to the chest.  He drinks alcohol occasionally. He denies any unilateral leg pain or leg swelling, no recent prolonged periods of immobilization. There are no other complaints, changes, or physical findings at this time. PCP: Juan C Koehler MD    No current facility-administered medications on file prior to encounter. Current Outpatient Medications on File Prior to Encounter   Medication Sig Dispense Refill    hydrOXYzine HCL (ATARAX) 25 mg tablet Take 1 Tablet by mouth three (3) times daily as needed for Anxiety. 30 Tablet 2    ondansetron (ZOFRAN ODT) 8 mg disintegrating tablet DISSOLVE 1 TABLET IN MOUTH EVERY 8 HOURS AS NEEDED FOR NAUSEA 30 Tablet 0    pantoprazole (PROTONIX) 40 mg tablet Take 1 Tablet by mouth daily. 90 Tablet 3    ferrous sulfate (Iron) 325 mg (65 mg iron) tablet Take 1 Tablet by mouth every fourty-eight (48) hours.  39 Tablet 3    fenofibrate nanocrystallized (TRICOR) 145 mg tablet Take 145 mg by mouth daily. Past History     Past Medical History:  Past Medical History:   Diagnosis Date    Anemia     4/3/19    Bleeding hemorrhoid     Elevated cholesterol     Hypertension     Pancreatitis        Past Surgical History:  Past Surgical History:   Procedure Laterality Date    COLONOSCOPY N/A 4/4/2019    COL[facility] ENDOSCOPY    HX GI      HX HEMORRHOIDECTOMY      2013       Family History:  Family History   Problem Relation Age of Onset   [facility] Hypertension Mother     Breast Cancer Mother     Hypertension Father     Heart Attack Father     No Known Problems Sister        Social History:  Social History     Tobacco Use    Smoking status: Current Every Day Smoker     Packs/day: 0.25     Types: Cigarettes    Smokeless tobacco: Never Used   Vaping Use    Vaping Use: Former    Substances: Nicotine    Devices: Pre-filled or refillable cartridge   Substance Use Topics    Alcohol use: Yes     Comment: one beer per day    Drug use: Yes     Types: Marijuana       Allergies: Allergies   Allergen Reactions    Peanut Rash     Face swelling, scratchy throat, rash         Review of Systems   no fever  No ear pain  No eye pain  Reports shortness of breath  Reports chest pain  Reports epigastric abdominal pain  no dysuria  no leg pain  No rash  No lymphadenopathy  No weight loss    Physical Exam   Physical Exam  Constitutional:       General: He is not in acute distress. Appearance: He is not toxic-appearing. HENT:      Head: Normocephalic and atraumatic. Eyes:      Extraocular Movements: Extraocular movements intact. Cardiovascular:      Rate and Rhythm: Normal rate and regular rhythm. Pulmonary:      Effort: Pulmonary effort is normal.      Breath sounds: Normal breath sounds. Abdominal:      Palpations: Abdomen is soft. Tenderness: There is abdominal tenderness. Comments: Tender to palpation in the epigastric region without guarding or rebound tenderness, no right upper quadrant tenderness   Musculoskeletal:         General: No deformity. Cervical back: Neck supple. Right lower leg: No edema. Left lower leg: No edema. Skin:     General: Skin is warm and dry. Neurological:      General: No focal deficit present. Mental Status: He is alert and oriented to person, place, and time. Psychiatric:         Mood and Affect: Mood normal.         Diagnostic Study Results     Labs -   No results found for this or any previous visit (from the past 24 hour(s)). Radiologic Studies -   XR CHEST PORT    (Results Pending)     CT Results  (Last 48 hours)    None        CXR Results  (Last 48 hours)    None            Medical Decision Making   I am the first provider for this patient. I reviewed the vital signs, available nursing notes, past medical history, past surgical history, family history and social history. Vital Signs-Reviewed the patient's vital signs. Patient Vitals for the past 24 hrs:   Pulse Resp BP SpO2   06/29/22 0852 67 18 (!) 165/93 100 %         Provider Notes (Medical Decision Making):   28-year-old male presenting with epigastric and chest pain and nausea vomiting. Differential includes pancreatitis, gastritis or GERD, esophageal spasm, atypical ACS. He is afebrile and nontoxic-appearing, no right upper quadrant tenderness, unlikely about a biliary emergency. He is saturating 100% on room air, not tachycardic, low risk for PE by [place] and fulfills PERC criteria, unlikely PE.    ED Course:     Initial assessment performed. The patients presenting problems have been discussed, and they are in agreement with the care plan formulated and outlined with them. I have encouraged them to ask questions as they arise throughout their visit.         EKG is performed at 8: 48, shows sinus rhythm at a rate of 65, , , QTc 424, axis upright, no ST segment elevation or depression concerning for ACS, there is upsloping ST elevation with J-point notching consistent with early repolarization and LVH, this is interpreted to sinus rhythm with early repolarization. Chart is reviewed, EKG from 6 months ago documents LVH and early repolarization as well. Patient is given antiemetics, IV fluids, pain medications. Chart is reviewed, does have history of pancreatitis in the past, CT scan in 2019 shows extensive inflammatory stranding around the enlarged pancreas without fluid collection. CBC negative for anemia, basic metabolic panel with normal renal function, no worrisome electrolyte abnormalities, LFTs normal, bilirubin normal, lipase elevated at 2862 consistent with pancreatitis. Initial troponin 22, and unchanged on repeat at 22.  UA not suggestive of UTI. On reevaluation, patient is resting comfortably and states that they feel improved. He says the GI cocktail really helped. Patient is counseled on supportive care and return precautions. Will return to the ED for any worsening pain, fevers, intractable vomiting, or any new or worrisome symptoms. Counseled on adequate hydration and gradual progression of p.o. and avoidance of alcohol. Will followup with primary care doctor within 2-3 days. Critical Care Time:         Disposition:  Home    PLAN:  1. Current Discharge Medication List        2.    Follow-up Information    None       Return to ED if worse     Diagnosis     Clinical Impression: Acute pancreatitis

## 2022-06-29 NOTE — Clinical Note
[address]  [facility] EMERGENCY DEPT  [address] [address]  [phone]    Work/School Note    Date: 6/29/2022    To Whom It May concern:    Radha Azar was seen and treated today in the emergency room by the following provider(s):  Attending Provider: Billy Randle MD.      Radha Azar is excused from work/school on 6/29/2022 through 7/1/2022. He is medically clear to return to work/school on 7/2/2022.          Sincerely,          Nemo Brennan MD

## 2022-07-21 RX ORDER — ONDANSETRON 8 MG/1
TABLET, ORALLY DISINTEGRATING ORAL
Qty: 30 TABLET | Refills: 0 | Status: SHIPPED | OUTPATIENT
Start: 2022-07-21 | End: 2022-10-31

## 2022-07-29 DIAGNOSIS — R10.13 EPIGASTRIC PAIN: ICD-10-CM

## 2022-07-29 RX ORDER — PANTOPRAZOLE SODIUM 40 MG/1
TABLET, DELAYED RELEASE ORAL
Qty: 90 TABLET | Refills: 3 | Status: SHIPPED | OUTPATIENT
Start: 2022-07-29 | End: 2022-08-05 | Stop reason: SDUPTHER

## 2022-08-05 ENCOUNTER — TELEPHONE (OUTPATIENT)
Dept: FAMILY MEDICINE CLINIC | Age: 42
End: 2022-08-05

## 2022-08-05 DIAGNOSIS — R10.13 EPIGASTRIC PAIN: ICD-10-CM

## 2022-08-05 RX ORDER — PANTOPRAZOLE SODIUM 40 MG/1
40 TABLET, DELAYED RELEASE ORAL DAILY
Qty: 90 TABLET | Refills: 0 | Status: SHIPPED | OUTPATIENT
Start: 2022-08-05

## 2022-08-05 NOTE — TELEPHONE ENCOUNTER
Pt wife is calling bc the pharmacy still has not received the RX request yet.  System shows receipt was confirmed on 07/29/2022 12:18pm

## 2022-09-06 ENCOUNTER — TELEPHONE (OUTPATIENT)
Dept: INTERNAL MEDICINE CLINIC | Age: 42
End: 2022-09-06

## 2022-09-07 RX ORDER — FENOFIBRATE 145 MG/1
145 TABLET, COATED ORAL DAILY
OUTPATIENT
Start: 2022-09-07

## 2022-09-09 RX ORDER — FENOFIBRATE 145 MG/1
145 TABLET, COATED ORAL DAILY
Qty: 90 TABLET | Refills: 0 | Status: SHIPPED | OUTPATIENT
Start: 2022-09-09

## 2022-10-17 ENCOUNTER — OFFICE VISIT (OUTPATIENT)
Dept: FAMILY MEDICINE CLINIC | Age: 42
End: 2022-10-17
Payer: COMMERCIAL

## 2022-10-17 VITALS
OXYGEN SATURATION: 97 % | HEART RATE: 85 BPM | SYSTOLIC BLOOD PRESSURE: 124 MMHG | HEIGHT: 70 IN | TEMPERATURE: 98.2 F | DIASTOLIC BLOOD PRESSURE: 80 MMHG | WEIGHT: 139.8 LBS | RESPIRATION RATE: 16 BRPM | BODY MASS INDEX: 20.01 KG/M2

## 2022-10-17 DIAGNOSIS — K62.89 ANAL PAIN: ICD-10-CM

## 2022-10-17 DIAGNOSIS — E61.1 IRON DEFICIENCY: Primary | ICD-10-CM

## 2022-10-17 DIAGNOSIS — R79.89 ELEVATED LFTS: ICD-10-CM

## 2022-10-17 DIAGNOSIS — Z78.9 ALCOHOL USE: ICD-10-CM

## 2022-10-17 DIAGNOSIS — K21.9 GASTROESOPHAGEAL REFLUX DISEASE WITHOUT ESOPHAGITIS: ICD-10-CM

## 2022-10-17 DIAGNOSIS — E78.5 HYPERLIPIDEMIA, UNSPECIFIED HYPERLIPIDEMIA TYPE: ICD-10-CM

## 2022-10-17 DIAGNOSIS — Z87.19 HISTORY OF ACUTE PANCREATITIS: ICD-10-CM

## 2022-10-17 PROCEDURE — 99214 OFFICE O/P EST MOD 30 MIN: CPT | Performed by: FAMILY MEDICINE

## 2022-10-17 NOTE — PROGRESS NOTES
Health Maintenance Due   Topic Date Due    Hepatitis C Screening  Never done    Pneumococcal 0-64 years (1 - PCV) Never done    DTaP/Tdap/Td series (1 - Tdap) Never done    COVID-19 Vaccine (2 - Booster for Pb series) 06/01/2021    Flu Vaccine (1) Never done    Depression Screen  08/23/2022     1. \"Have you been to the ER, urgent care clinic since your last visit? Hospitalized since your last visit? \"  Yes. [facility]    2. \"Have you seen or consulted any other health care providers outside of the [address] [place] [address] since your last visit? \" No     3. For patients aged 39-70: Has the patient had a colonoscopy / FIT/ Cologuard? NA - based on age      If the patient is female:    4. For patients aged 41-77: Has the patient had a mammogram within the past 2 years? NA - based on age or sex      11. For patients aged 21-65: Has the patient had a pap smear?  NA - based on age or sex

## 2022-10-17 NOTE — PROGRESS NOTES
HPI  Geraldine Soto is a 40 y.o. male who presents to follow-up on chronic medical issues. When I saw him last he had just got out of the hospital with acute pancreatitis. This was deemed to be due to to hypertriglyceridemia. 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 He is currently drinking 1-3 beers per day. He views this as a significant improvement over the half gallon of liquor per week. We discussed that abstinence would be best given his history of pancreatitis but that he is certainly taking less of a risk with the reduced intake. He reports that marijuana is helping to maintain the lower alcohol use. It is also helping his appetite. He has a history of iron deficiency anemia and has an iron supplement. Takes this every other day or every third day. When he does he gets constipated    PMHx:  Past Medical History:   Diagnosis Date    Anemia     4/3/19    Bleeding hemorrhoid     Elevated cholesterol     Hypertension     Pancreatitis        Meds:   Current Outpatient Medications   Medication Sig Dispense Refill    fenofibrate nanocrystallized (TRICOR) 145 mg tablet Take 1 Tablet by mouth daily. 90 Tablet 0    pantoprazole (PROTONIX) 40 mg tablet Take 1 Tablet by mouth in the morning. 90 Tablet 0    ondansetron (ZOFRAN ODT) 8 mg disintegrating tablet DISSOLVE 1 TABLET IN MOUTH EVERY 8 HOURS AS NEEDED FOR NAUSEA 30 Tablet 0    ondansetron hcl (Zofran) 4 mg tablet Take 1 Tablet by mouth every eight (8) hours as needed for Nausea. 10 Tablet 0    hydrOXYzine HCL (ATARAX) 25 mg tablet Take 1 Tablet by mouth three (3) times daily as needed for Anxiety. 30 Tablet 2    ferrous sulfate (Iron) 325 mg (65 mg iron) tablet Take 1 Tablet by mouth every fourty-eight (48) hours.  45 Tablet 3    aluminum-magnesium hydroxide (MAALOX) 200-200 mg/5 mL suspension Take 15 mL by mouth every six (6) hours as needed for Indigestion. (Patient not taking: Reported on 10/17/2022) 150 mL 0       Allergies: Allergies   Allergen Reactions    Peanut Rash     Face swelling, scratchy throat, rash       Smoker:  Social History     Tobacco Use   Smoking Status Every Day    Packs/day: 0.25    Types: Cigarettes   Smokeless Tobacco Never       ETOH:   Social History     Substance and Sexual Activity   Alcohol Use Yes    Comment: one beer per day       FH:   Family History   Problem Relation Age of Onset    Hypertension Mother     Breast Cancer Mother     Hypertension Father     Heart Attack Father     No Known Problems Sister        ROS:   As listed in HPI. In addition:  Constitutional:   No headache, fever, fatigue, weight loss or weight gain      Cardiac:    No chest pain      Resp:   No cough or shortness of breath      Neuro   No loss of consciousness, dizziness, seizures      Physical Exam:  Blood pressure 124/80, pulse 85, temperature 98.2 °F (36.8 °C), temperature source Temporal, resp. rate 16, height 5' 10\" (1.778 m), weight 139 lb 12.8 oz (63.4 kg), SpO2 97 %. GEN: No apparent distress. Alert and oriented and responds to all questions appropriately. NEUROLOGIC:  No focal neurologic deficits. Strength and sensation grossly intact. Coordination and gait grossly intact. EXT: Well perfused. No edema. SKIN: No obvious rashes. Lungs clear to auscultation bilaterally  CV regular rate rhythm no murmur         Assessment and Plan     History of iron deficiency  We will check iron stores. He may be able to avoid the iron supplement given side effect of constipation and improved appetite. History of pancreatitis  Alcohol and triglyceride induced  Recommend abstinence from alcohol is your safest course    Hypertriglyceridemia  Fenofibrate    Reflux  Pantoprazole is well-tolerated. Hemorrhoid  Intermittent issue  Knows to take stool softeners and avoid constipation. He is thinking about seeing colorectal surgery for this.   He knows the phone number      ICD-10-CM ICD-9-CM    1. Iron deficiency  E61.1 280.9 FERRITIN      IRON PROFILE      FERRITIN      IRON PROFILE      2. History of acute pancreatitis  Z87.19 V12.79       3. Alcohol use  Z72.89 V49.89       4. Anal pain  K62.89 569.42       5. Gastroesophageal reflux disease without esophagitis  K21.9 530.81       6. Hyperlipidemia, unspecified hyperlipidemia type  E78.5 272.4 LIPID PANEL      CBC WITH AUTOMATED DIFF      METABOLIC PANEL, COMPREHENSIVE      LIPID PANEL      CBC WITH AUTOMATED DIFF      METABOLIC PANEL, COMPREHENSIVE      7. Elevated LFTs  R79.89 790.6 LIPID PANEL      CBC WITH AUTOMATED DIFF      METABOLIC PANEL, COMPREHENSIVE      LIPID PANEL      CBC WITH AUTOMATED DIFF      METABOLIC PANEL, COMPREHENSIVE          AVS given.  Pt expressed understanding of instructions

## 2022-10-18 ENCOUNTER — TELEPHONE (OUTPATIENT)
Dept: FAMILY MEDICINE CLINIC | Age: 42
End: 2022-10-18

## 2022-10-18 DIAGNOSIS — D64.9 ANEMIA, UNSPECIFIED TYPE: Primary | ICD-10-CM

## 2022-10-18 LAB
ALBUMIN SERPL-MCNC: 4.8 G/DL (ref 4–5)
ALBUMIN/GLOB SERPL: 2.2 {RATIO} (ref 1.2–2.2)
ALP SERPL-CCNC: 62 IU/L (ref 44–121)
ALT SERPL-CCNC: 9 IU/L (ref 0–44)
AST SERPL-CCNC: 38 IU/L (ref 0–40)
BASOPHILS # BLD AUTO: 0 X10E3/UL (ref 0–0.2)
BASOPHILS NFR BLD AUTO: 0 %
BILIRUB SERPL-MCNC: <0.2 MG/DL (ref 0–1.2)
BUN SERPL-MCNC: 10 MG/DL (ref 6–24)
BUN/CREAT SERPL: 11 (ref 9–20)
CALCIUM SERPL-MCNC: 9.6 MG/DL (ref 8.7–10.2)
CHLORIDE SERPL-SCNC: 99 MMOL/L (ref 96–106)
CHOLEST SERPL-MCNC: 189 MG/DL (ref 100–199)
CO2 SERPL-SCNC: 19 MMOL/L (ref 20–29)
CREAT SERPL-MCNC: 0.92 MG/DL (ref 0.76–1.27)
EGFR: 107 ML/MIN/1.73
EOSINOPHIL # BLD AUTO: 0.1 X10E3/UL (ref 0–0.4)
EOSINOPHIL NFR BLD AUTO: 2 %
ERYTHROCYTE [DISTWIDTH] IN BLOOD BY AUTOMATED COUNT: 18.5 % (ref 11.6–15.4)
FERRITIN SERPL-MCNC: 9 NG/ML (ref 30–400)
GLOBULIN SER CALC-MCNC: 2.2 G/DL (ref 1.5–4.5)
GLUCOSE SERPL-MCNC: 86 MG/DL (ref 70–99)
HCT VFR BLD AUTO: 32 % (ref 37.5–51)
HDLC SERPL-MCNC: 63 MG/DL
HGB BLD-MCNC: 10.1 G/DL (ref 13–17.7)
IMM GRANULOCYTES # BLD AUTO: 0 X10E3/UL (ref 0–0.1)
IMM GRANULOCYTES NFR BLD AUTO: 1 %
IMP & REVIEW OF LAB RESULTS: NORMAL
IRON SATN MFR SERPL: 3 % (ref 15–55)
IRON SERPL-MCNC: 14 UG/DL (ref 38–169)
LDLC SERPL CALC-MCNC: 112 MG/DL (ref 0–99)
LYMPHOCYTES # BLD AUTO: 1.7 X10E3/UL (ref 0.7–3.1)
LYMPHOCYTES NFR BLD AUTO: 27 %
MCH RBC QN AUTO: 24.9 PG (ref 26.6–33)
MCHC RBC AUTO-ENTMCNC: 31.6 G/DL (ref 31.5–35.7)
MCV RBC AUTO: 79 FL (ref 79–97)
MONOCYTES # BLD AUTO: 0.5 X10E3/UL (ref 0.1–0.9)
MONOCYTES NFR BLD AUTO: 8 %
NEUTROPHILS # BLD AUTO: 3.9 X10E3/UL (ref 1.4–7)
NEUTROPHILS NFR BLD AUTO: 62 %
PLATELET # BLD AUTO: 288 X10E3/UL (ref 150–450)
POTASSIUM SERPL-SCNC: 4.4 MMOL/L (ref 3.5–5.2)
PROT SERPL-MCNC: 7 G/DL (ref 6–8.5)
RBC # BLD AUTO: 4.06 X10E6/UL (ref 4.14–5.8)
SODIUM SERPL-SCNC: 137 MMOL/L (ref 134–144)
TIBC SERPL-MCNC: 535 UG/DL (ref 250–450)
TRIGL SERPL-MCNC: 75 MG/DL (ref 0–149)
UIBC SERPL-MCNC: 521 UG/DL (ref 111–343)
VLDLC SERPL CALC-MCNC: 14 MG/DL (ref 5–40)
WBC # BLD AUTO: 6.3 X10E3/UL (ref 3.4–10.8)

## 2022-10-18 NOTE — TELEPHONE ENCOUNTER
Labs reviewed. You are anemic. Your blood counts are lower than they were 3 months ago. This raises the concern that you might have lost a significant amount of blood. Most common way to lose blood is bleeding into your stomach. We should check a FIT kit to see if there is any blood in your stool. Also have extremely low iron stores. You had said that you could not tolerate taking the iron supplement. You are going to have trouble making new blood cells if you do not get some iron. You have the option of trying to get iron in your food. You may benefit from getting an iron infusion.     Let me know if you would like to get an iron infusion

## 2022-10-31 RX ORDER — ONDANSETRON 8 MG/1
TABLET, ORALLY DISINTEGRATING ORAL
Qty: 30 TABLET | Refills: 3 | Status: SHIPPED | OUTPATIENT
Start: 2022-10-31

## 2022-12-06 RX ORDER — FENOFIBRATE 145 MG/1
145 TABLET, COATED ORAL DAILY
Qty: 90 TABLET | Refills: 3 | Status: SHIPPED | OUTPATIENT
Start: 2022-12-06

## 2022-12-06 NOTE — TELEPHONE ENCOUNTER
----- Message from Bharati Juares sent at 12/6/2022  1:33 PM EST -----  Subject: Refill Request    QUESTIONS  Name of Medication? fenofibrate nanocrystallized (TRICOR) 145 mg tablet  Patient-reported dosage and instructions? 145mg, 1 times daily   How many days do you have left? 0  Preferred Pharmacy? [facility]  Pharmacy phone number (if available)? [phone]  Additional Information for Provider? 90 day supply  ---------------------------------------------------------------------------  --------------  CALL BACK INFO  What is the best way for the office to contact you? OK to leave message on   voicemail  Preferred Call Back Phone Number? [phone]  ---------------------------------------------------------------------------  --------------  SCRIPT ANSWERS  Relationship to Patient? Other  Representative Name? Sunny Rodriguez (Spouse)  Is the Representative on the appropriate HIPAA document in Epic?  Yes

## 2023-03-15 NOTE — PROGRESS NOTES
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 Both drugs decrease the effect of warfarin. Platelet Inhibitors (Dose/Frequency): none    Recent Labs      07/14/17   0322  07/13/17   0444  07/12/17   0434   INR  1.0   --    --    HGB  10.6*  10.1*  9.7*   PLT  207  196  200   ALB   --   3.9  3.6   SGOT   --   144*  60*   ALT   --   66  42   TBILI   --   0.7  1.2*   CREA  0.69*  0.70  0.72   BUN  3*  3*  5*        Child Carranza Score, if applicable: na    Warfarin Sensitivity Factors Present: none      Impression/Plan:   - Will repeat warfarin 7.5 mg PO today. - Continue Lovenox bridge tx. Pharmacy will follow daily and adjust the dose as appropriate.     Thanks for the consult    ELLIOTT Jimenez L LE impairment secondary to surgery/bilateral upper extremity ROM was WFL (within functional limits)/Right LE ROM was WFL (within functional limits)/deficits as listed below

## 2023-03-24 ENCOUNTER — TELEPHONE (OUTPATIENT)
Dept: FAMILY MEDICINE CLINIC | Age: 43
End: 2023-03-24

## 2023-03-24 NOTE — TELEPHONE ENCOUNTER
Per pt's wife calling with complaints of poor circulation in his legs also with headaches;    Call transferred to Clinical Staff for further assistance      Thank You

## 2023-03-24 NOTE — TELEPHONE ENCOUNTER
Called patient and verified name and date of birth. Chief Complaint   Patient presents with    Leg Pain     Pt reports Bilateral leg pain prolonged, reduced ability to walk, some extreme pain in legs during movement. Pt has lost most of appetite recently and has gained some weight over the recent weeks. Pt reports overall not feeling well or normal since the leg pain has appeared. Pt used to move more but since the leg pain, he has not been able to walk very short distances without having extreme amounts of pain. Per patient's wife, he is having difficulty walking, associated with a lot of pain. Advised patient to be evaluated for circulatory and skin problems. Pt agrees.      Signed By: Brittany Sims     March 24, 2023

## 2023-05-16 ENCOUNTER — NURSE TRIAGE (OUTPATIENT)
Dept: OTHER | Facility: CLINIC | Age: 43
End: 2023-05-16

## 2023-05-16 NOTE — TELEPHONE ENCOUNTER
Location of patient: [address] samuels from Fatuma Hoyt at [facility] with [employer]. Subjective: Caller states \"He has burning and tingling in his legs\"     Current Symptoms: When walks, his legs \"burn out\" really bad, bother him a lot. Fatigue. Out of breath with walking. Loss of appetite a few weeks ago but that has came back. Onset: 1-2 month ago; unchanged    Associated Symptoms: reduced activity    Pain Severity:  tingling; constant    Temperature: denies fever     What has been tried: hand and electric massage. Pain relieving gel. LMP: NA Pregnant: NA    Recommended disposition: Go to ED/UCC Now (Or to Office with PCP Approval)    Care advice provided, patient verbalizes understanding; denies any other questions or concerns; instructed to call back for any new or worsening symptoms. Writer provided warm transfer to [place] at [facility] for second level triage. Attention Provider: Thank you for allowing me to participate in the care of your patient. The patient was connected to triage in response to information provided to the ECC/PSC. Please do not respond through this encounter as the response is not directed to a shared pool.     Reason for Disposition   Patient sounds very sick or weak to the triager    Protocols used: Neurologic Deficit-ADULT-[place]

## 2023-05-17 ENCOUNTER — TELEPHONE (OUTPATIENT)
Age: 43
End: 2023-05-17

## 2023-05-17 DIAGNOSIS — I73.9 VASCULAR CLAUDICATION (HCC): Primary | ICD-10-CM

## 2023-05-17 NOTE — TELEPHONE ENCOUNTER
Reviewed more information in nurse triage note. Called with a similar complaint 2 months ago. He could be describing vascular claudication which case seeing cardiology would be appropriate.   Dr. David Mejia       He may want to come in to see us since there could be other things going on

## 2023-05-17 NOTE — TELEPHONE ENCOUNTER
----- Message from Maday Harden sent at 5/16/2023  4:34 PM EDT -----  Subject: Referral Request    Reason for referral request? Patient needs a referral for a Cardiologist.   Tingling and burning in the legs. Please contact the patient. [phone]  Provider patient wants to be referred to(if known):     Provider Phone Number(if known):     Additional Information for Provider?   ---------------------------------------------------------------------------  --------------  [id number] [employer]    [phone]; OK to leave message on voicemail  ---------------------------------------------------------------------------  --------------

## 2023-05-18 NOTE — TELEPHONE ENCOUNTER
Unable to reach pt, unable to leave VM.  I was able to get in contact with his wife and she states she is currently in a meeting and would need to call us back in like a hour or so

## 2023-05-23 ENCOUNTER — TELEPHONE (OUTPATIENT)
Age: 43
End: 2023-05-23

## 2023-05-23 RX ORDER — ONDANSETRON 8 MG/1
TABLET, ORALLY DISINTEGRATING ORAL
Qty: 21 TABLET | Refills: 3 | Status: SHIPPED | OUTPATIENT
Start: 2023-05-23

## 2023-05-23 NOTE — TELEPHONE ENCOUNTER
Refill request (pt calling)    PT IS OUT    ondansetron (ZOFRAN-ODT) 8 MG TBDP disintegrating tablet    Pharmacy is [facility]

## 2023-06-07 ENCOUNTER — OFFICE VISIT (OUTPATIENT)
Age: 43
End: 2023-06-07
Payer: COMMERCIAL

## 2023-06-07 VITALS
OXYGEN SATURATION: 99 % | RESPIRATION RATE: 17 BRPM | HEIGHT: 70 IN | HEART RATE: 85 BPM | SYSTOLIC BLOOD PRESSURE: 126 MMHG | TEMPERATURE: 97 F | BODY MASS INDEX: 21.5 KG/M2 | DIASTOLIC BLOOD PRESSURE: 76 MMHG | WEIGHT: 150.2 LBS

## 2023-06-07 DIAGNOSIS — E53.8 B12 DEFICIENCY: ICD-10-CM

## 2023-06-07 DIAGNOSIS — E55.9 VITAMIN D DEFICIENCY: ICD-10-CM

## 2023-06-07 DIAGNOSIS — R06.09 DOE (DYSPNEA ON EXERTION): ICD-10-CM

## 2023-06-07 DIAGNOSIS — D64.9 ANEMIA, UNSPECIFIED TYPE: ICD-10-CM

## 2023-06-07 DIAGNOSIS — R06.02 SOB (SHORTNESS OF BREATH): ICD-10-CM

## 2023-06-07 DIAGNOSIS — I73.9 CLAUDICATION (HCC): ICD-10-CM

## 2023-06-07 DIAGNOSIS — E61.1 IRON DEFICIENCY: Primary | ICD-10-CM

## 2023-06-07 PROBLEM — K64.8 BLEEDING INTERNAL HEMORRHOIDS: Status: ACTIVE | Noted: 2017-11-09

## 2023-06-07 PROCEDURE — 99214 OFFICE O/P EST MOD 30 MIN: CPT | Performed by: FAMILY MEDICINE

## 2023-06-07 PROCEDURE — 3078F DIAST BP <80 MM HG: CPT | Performed by: FAMILY MEDICINE

## 2023-06-07 PROCEDURE — 3074F SYST BP LT 130 MM HG: CPT | Performed by: FAMILY MEDICINE

## 2023-06-07 ASSESSMENT — PATIENT HEALTH QUESTIONNAIRE - PHQ9
1. LITTLE INTEREST OR PLEASURE IN DOING THINGS: 0
SUM OF ALL RESPONSES TO PHQ QUESTIONS 1-9: 1
2. FEELING DOWN, DEPRESSED OR HOPELESS: 1
SUM OF ALL RESPONSES TO PHQ9 QUESTIONS 1 & 2: 1
SUM OF ALL RESPONSES TO PHQ QUESTIONS 1-9: 1

## 2023-06-07 NOTE — PROGRESS NOTES
HPI  Estefany Howell is a 40 y.o. male who ***    PMHx:  Past Medical History:   Diagnosis Date    Anemia     4/3/19    Bleeding hemorrhoid     Elevated cholesterol     Hypertension     Pancreatitis        Meds:   Current Outpatient Medications   Medication Sig Dispense Refill    ondansetron (ZOFRAN-ODT) 8 MG TBDP disintegrating tablet DISSOLVE 1 TABLET IN MOUTH EVERY 8 HOURS AS NEEDED FOR NAUSEA 21 tablet 3    aluminum-magnesium hydroxide 200-200 MG/5ML suspension Take 15 mLs by mouth every 6 hours as needed      fenofibrate (TRICOR) 145 MG tablet Take 145 mg by mouth daily      ferrous sulfate (IRON 325) 325 (65 Fe) MG tablet Take 325 mg by mouth every 48 hours      hydrOXYzine HCl (ATARAX) 25 MG tablet Take 25 mg by mouth 3 times daily as needed      ondansetron (ZOFRAN) 4 MG tablet Take 4 mg by mouth every 8 hours as needed      pantoprazole (PROTONIX) 40 MG tablet Take 40 mg by mouth daily       No current facility-administered medications for this visit. Allergies:   Not on File    Smoker:  Social History     Tobacco Use   Smoking Status Every Day    Packs/day: 0.25    Types: Cigarettes   Smokeless Tobacco Never       ETOH:   Social History     Substance and Sexual Activity   Alcohol Use Yes       FH:   Family History   Problem Relation Age of Onset    No Known Problems Sister     Heart Attack Father     Hypertension Father     Breast Cancer Mother     Hypertension Mother        ROS:   As listed in HPI. In addition:  Constitutional:   No headache, fever, fatigue, weight loss or weight gain      Cardiac:    No chest pain      Resp:   No cough or shortness of breath      Neuro   No loss of consciousness, dizziness, seizures      Physical Exam:  There were no vitals taken for this visit. GEN: No apparent distress. Alert and oriented and responds to all questions appropriately. NEUROLOGIC:  No focal neurologic deficits. Strength and sensation grossly intact. Coordination and gait grossly intact.    EXT:
No chief complaint on file. Health Maintenance Due   Topic Date Due    Varicella vaccine (1 of 2 - 2-dose childhood series) Never done    Pneumococcal 0-64 years Vaccine (1 - PCV) Never done    HIV screen  Never done    Hepatitis C screen  Never done    DTaP/Tdap/Td vaccine (1 - Tdap) Never done    COVID-19 Vaccine (2 - Booster for Markus series) 06/01/2021           1. \"Have you been to the ER, urgent care clinic since your last visit? Hospitalized since your last visit? \" No    2. \"Have you seen or consulted any other health care providers outside of the [address] [place] [address] since your last visit? \" No     3. For patients aged 39-70: Has the patient had a colonoscopy / FIT/ Cologuard? NA - based on age      If the patient is female:    4. For patients aged 41-77: Has the patient had a mammogram within the past 2 years? NA - based on age or sex      11. For patients aged 21-65: Has the patient had a pap smear?  NA - based on age or sex
Tobacco Use   Smoking Status Every Day    Packs/day: 0.25    Types: Cigarettes   Smokeless Tobacco Never       ETOH:   Social History     Substance and Sexual Activity   Alcohol Use Not Currently       FH:   Family History   Problem Relation Age of Onset    No Known Problems Sister     Heart Attack Father     Hypertension Father     Breast Cancer Mother     Hypertension Mother        ROS:   As listed in HPI. In addition:  Constitutional:   No headache, fever, fatigue, weight loss or weight gain      Cardiac:    No chest pain      Resp:   No cough or shortness of breath      Neuro   No loss of consciousness, dizziness, seizures      Physical Exam:  Blood pressure 126/76, pulse 85, temperature 97 °F (36.1 °C), temperature source Temporal, resp. rate 17, height 5' 10\" (1.778 m), weight 150 lb 3.2 oz (68.1 kg), SpO2 99 %. GEN: No apparent distress. Alert and oriented and responds to all questions appropriately. NEUROLOGIC:  No focal neurologic deficits. Strength and sensation grossly intact. Coordination and gait grossly intact. EXT: Well perfused. No edema. SKIN: No obvious rashes. Lungs clear to auscultation bilaterally  CV regular rate rhythm and no appreciable murmur. No carotid bruit. Detectable pulses in the ankles. Some hair loss of the calf. Unable to assess capillary refill. I walked with him in the hallway on a oximeter. He only made it 10 yards and then started to favor the right leg. He felt like this was weak and going to give out. His pulse went from 85 at rest and 95 with this level of exertion. His oxygen saturation maintained 97-99. His blood pressure immediately after exercise 1 is 127/75 seated and 126/81 standing with no appreciable change in pulse sitting and standing       Assessment and Plan     Pretty profound exercise intolerance  He we know that he has an iron deficiency and it is unlikely that he has recuperated his iron stores.   This could be iron deficiency and/or

## 2023-06-08 ENCOUNTER — TELEPHONE (OUTPATIENT)
Age: 43
End: 2023-06-08

## 2023-06-08 PROBLEM — D64.9 SYMPTOMATIC ANEMIA: Status: ACTIVE | Noted: 2023-06-08

## 2023-06-08 LAB
25(OH)D3+25(OH)D2 SERPL-MCNC: 7.9 NG/ML (ref 30–100)
ALBUMIN SERPL-MCNC: 4.7 G/DL (ref 4–5)
ALBUMIN/GLOB SERPL: 2.1 {RATIO} (ref 1.2–2.2)
ALP SERPL-CCNC: 53 IU/L (ref 44–121)
ALT SERPL-CCNC: 10 IU/L (ref 0–44)
AST SERPL-CCNC: 36 IU/L (ref 0–40)
BASOPHILS # BLD AUTO: 0 X10E3/UL (ref 0–0.2)
BASOPHILS NFR BLD AUTO: 1 %
BILIRUB SERPL-MCNC: <0.2 MG/DL (ref 0–1.2)
BUN SERPL-MCNC: 6 MG/DL (ref 6–24)
BUN/CREAT SERPL: 7 (ref 9–20)
CALCIUM SERPL-MCNC: 8.9 MG/DL (ref 8.7–10.2)
CHLORIDE SERPL-SCNC: 101 MMOL/L (ref 96–106)
CK SERPL-CCNC: 290 U/L (ref 49–439)
CO2 SERPL-SCNC: 21 MMOL/L (ref 20–29)
CREAT SERPL-MCNC: 0.87 MG/DL (ref 0.76–1.27)
EGFRCR SERPLBLD CKD-EPI 2021: 111 ML/MIN/1.73
EOSINOPHIL # BLD AUTO: 0.1 X10E3/UL (ref 0–0.4)
EOSINOPHIL NFR BLD AUTO: 2 %
ERYTHROCYTE [DISTWIDTH] IN BLOOD BY AUTOMATED COUNT: 32.2 % (ref 11.6–15.4)
FERRITIN SERPL-MCNC: 18 NG/ML (ref 30–400)
FOLATE SERPL-MCNC: 7.1 NG/ML
GLOBULIN SER CALC-MCNC: 2.2 G/DL (ref 1.5–4.5)
GLUCOSE SERPL-MCNC: 93 MG/DL (ref 70–99)
HBA1C MFR BLD: 5.7 % (ref 4.8–5.6)
HCT VFR BLD AUTO: 19.8 % (ref 37.5–51)
HGB BLD-MCNC: 5.2 G/DL (ref 13–17.7)
IMM GRANULOCYTES # BLD AUTO: 0.1 X10E3/UL (ref 0–0.1)
IMM GRANULOCYTES NFR BLD AUTO: 1 %
IRON SATN MFR SERPL: 25 % (ref 15–55)
IRON SERPL-MCNC: 145 UG/DL (ref 38–169)
LYMPHOCYTES # BLD AUTO: 1.3 X10E3/UL (ref 0.7–3.1)
LYMPHOCYTES NFR BLD AUTO: 29 %
MCH RBC QN AUTO: 17.1 PG (ref 26.6–33)
MCHC RBC AUTO-ENTMCNC: 26.3 G/DL (ref 31.5–35.7)
MCV RBC AUTO: 65 FL (ref 79–97)
MONOCYTES # BLD AUTO: 0.3 X10E3/UL (ref 0.1–0.9)
MONOCYTES NFR BLD AUTO: 7 %
MORPHOLOGY BLD-IMP: ABNORMAL
NEUTROPHILS # BLD AUTO: 2.7 X10E3/UL (ref 1.4–7)
NEUTROPHILS NFR BLD AUTO: 60 %
PLATELET # BLD AUTO: 32 X10E3/UL (ref 150–450)
POTASSIUM SERPL-SCNC: 3.7 MMOL/L (ref 3.5–5.2)
PROT SERPL-MCNC: 6.9 G/DL (ref 6–8.5)
RBC # BLD AUTO: 3.04 X10E6/UL (ref 4.14–5.8)
SODIUM SERPL-SCNC: 139 MMOL/L (ref 134–144)
TIBC SERPL-MCNC: 590 UG/DL (ref 250–450)
TSH SERPL DL<=0.005 MIU/L-ACNC: 1.68 UIU/ML (ref 0.45–4.5)
UIBC SERPL-MCNC: 445 UG/DL (ref 111–343)
VIT B12 SERPL-MCNC: 372 PG/ML (ref 232–1245)
WBC # BLD AUTO: 4.4 X10E3/UL (ref 3.4–10.8)

## 2023-06-08 NOTE — TELEPHONE ENCOUNTER
Unable to reach pt. Unable to leave voicemail, full. Unable to reach pt wife. Left voicemail to donavon back, urgent message.

## 2023-06-08 NOTE — TELEPHONE ENCOUNTER
Pt's wife called back and I verbalized that pt need to go to the hospital. She expressed understanding and will take him as soon as possible.

## 2023-06-08 NOTE — TELEPHONE ENCOUNTER
Needs to go to emergency room for transfusion. Go in soon as possible. He will need several units of blood.   It is possible they will admit him to work-up why he has had such profound blood loss

## 2023-06-08 NOTE — TELEPHONE ENCOUNTER
Pt wife called back and spoke with  to inform she is on the way to pick patient up and will be taking him to [address] as soon as she picks him up.

## 2023-06-14 ENCOUNTER — TELEPHONE (OUTPATIENT)
Age: 43
End: 2023-06-14

## 2023-06-26 SDOH — ECONOMIC STABILITY: TRANSPORTATION INSECURITY
IN THE PAST 12 MONTHS, HAS LACK OF TRANSPORTATION KEPT YOU FROM MEETINGS, WORK, OR FROM GETTING THINGS NEEDED FOR DAILY LIVING?: PATIENT DECLINED

## 2023-06-26 SDOH — ECONOMIC STABILITY: FOOD INSECURITY: WITHIN THE PAST 12 MONTHS, THE FOOD YOU BOUGHT JUST DIDN'T LAST AND YOU DIDN'T HAVE MONEY TO GET MORE.: PATIENT DECLINED

## 2023-06-26 SDOH — ECONOMIC STABILITY: HOUSING INSECURITY
IN THE LAST 12 MONTHS, WAS THERE A TIME WHEN YOU DID NOT HAVE A STEADY PLACE TO SLEEP OR SLEPT IN A SHELTER (INCLUDING NOW)?: PATIENT REFUSED

## 2023-06-26 SDOH — ECONOMIC STABILITY: FOOD INSECURITY: WITHIN THE PAST 12 MONTHS, YOU WORRIED THAT YOUR FOOD WOULD RUN OUT BEFORE YOU GOT MONEY TO BUY MORE.: PATIENT DECLINED

## 2023-06-26 SDOH — ECONOMIC STABILITY: INCOME INSECURITY: HOW HARD IS IT FOR YOU TO PAY FOR THE VERY BASICS LIKE FOOD, HOUSING, MEDICAL CARE, AND HEATING?: PATIENT DECLINED

## 2023-06-27 ENCOUNTER — OFFICE VISIT (OUTPATIENT)
Age: 43
End: 2023-06-27
Payer: COMMERCIAL

## 2023-06-27 VITALS
TEMPERATURE: 97.8 F | SYSTOLIC BLOOD PRESSURE: 112 MMHG | WEIGHT: 145.2 LBS | DIASTOLIC BLOOD PRESSURE: 64 MMHG | HEIGHT: 70 IN | RESPIRATION RATE: 16 BRPM | OXYGEN SATURATION: 96 % | HEART RATE: 92 BPM | BODY MASS INDEX: 20.79 KG/M2

## 2023-06-27 DIAGNOSIS — E55.9 VITAMIN D DEFICIENCY: ICD-10-CM

## 2023-06-27 DIAGNOSIS — R06.09 DOE (DYSPNEA ON EXERTION): ICD-10-CM

## 2023-06-27 DIAGNOSIS — I73.9 CLAUDICATION (HCC): ICD-10-CM

## 2023-06-27 DIAGNOSIS — E61.1 IRON DEFICIENCY: ICD-10-CM

## 2023-06-27 DIAGNOSIS — E53.8 B12 DEFICIENCY: ICD-10-CM

## 2023-06-27 DIAGNOSIS — D64.9 ANEMIA, UNSPECIFIED TYPE: Primary | ICD-10-CM

## 2023-06-27 PROCEDURE — 99215 OFFICE O/P EST HI 40 MIN: CPT | Performed by: FAMILY MEDICINE

## 2023-06-27 PROCEDURE — 3074F SYST BP LT 130 MM HG: CPT | Performed by: FAMILY MEDICINE

## 2023-06-27 PROCEDURE — 3078F DIAST BP <80 MM HG: CPT | Performed by: FAMILY MEDICINE

## 2023-06-27 SDOH — ECONOMIC STABILITY: FOOD INSECURITY: WITHIN THE PAST 12 MONTHS, YOU WORRIED THAT YOUR FOOD WOULD RUN OUT BEFORE YOU GOT MONEY TO BUY MORE.: PATIENT DECLINED

## 2023-06-27 SDOH — ECONOMIC STABILITY: INCOME INSECURITY: HOW HARD IS IT FOR YOU TO PAY FOR THE VERY BASICS LIKE FOOD, HOUSING, MEDICAL CARE, AND HEATING?: PATIENT DECLINED

## 2023-06-27 SDOH — ECONOMIC STABILITY: FOOD INSECURITY: WITHIN THE PAST 12 MONTHS, THE FOOD YOU BOUGHT JUST DIDN'T LAST AND YOU DIDN'T HAVE MONEY TO GET MORE.: PATIENT DECLINED

## 2023-06-28 LAB
25(OH)D3+25(OH)D2 SERPL-MCNC: 20.5 NG/ML (ref 30–100)
ALBUMIN SERPL-MCNC: 5 G/DL (ref 4–5)
ALBUMIN/GLOB SERPL: 1.8 {RATIO} (ref 1.2–2.2)
ALP SERPL-CCNC: 52 IU/L (ref 44–121)
ALT SERPL-CCNC: 11 IU/L (ref 0–44)
AST SERPL-CCNC: 32 IU/L (ref 0–40)
BASOPHILS # BLD AUTO: 0.1 X10E3/UL (ref 0–0.2)
BASOPHILS NFR BLD AUTO: 1 %
BILIRUB SERPL-MCNC: 0.2 MG/DL (ref 0–1.2)
BUN SERPL-MCNC: 6 MG/DL (ref 6–24)
BUN/CREAT SERPL: 7 (ref 9–20)
CALCIUM SERPL-MCNC: 10.2 MG/DL (ref 8.7–10.2)
CHLORIDE SERPL-SCNC: 98 MMOL/L (ref 96–106)
CO2 SERPL-SCNC: 19 MMOL/L (ref 20–29)
CREAT SERPL-MCNC: 0.89 MG/DL (ref 0.76–1.27)
EGFRCR SERPLBLD CKD-EPI 2021: 110 ML/MIN/1.73
EOSINOPHIL # BLD AUTO: 0.2 X10E3/UL (ref 0–0.4)
EOSINOPHIL NFR BLD AUTO: 4 %
FERRITIN SERPL-MCNC: 156 NG/ML (ref 30–400)
GLOBULIN SER CALC-MCNC: 2.8 G/DL (ref 1.5–4.5)
GLUCOSE SERPL-MCNC: 81 MG/DL (ref 70–99)
HCT VFR BLD AUTO: 41.4 % (ref 37.5–51)
HGB BLD-MCNC: 11.9 G/DL (ref 13–17.7)
IMM GRANULOCYTES # BLD AUTO: 0 X10E3/UL (ref 0–0.1)
IMM GRANULOCYTES NFR BLD AUTO: 1 %
IRON SATN MFR SERPL: 4 % (ref 15–55)
IRON SERPL-MCNC: 24 UG/DL (ref 38–169)
LYMPHOCYTES # BLD AUTO: 1.6 X10E3/UL (ref 0.7–3.1)
LYMPHOCYTES NFR BLD AUTO: 26 %
MCH RBC QN AUTO: 23.2 PG (ref 26.6–33)
MCHC RBC AUTO-ENTMCNC: 28.7 G/DL (ref 31.5–35.7)
MCV RBC AUTO: 81 FL (ref 79–97)
MONOCYTES # BLD AUTO: 0.4 X10E3/UL (ref 0.1–0.9)
MONOCYTES NFR BLD AUTO: 6 %
MORPHOLOGY BLD-IMP: ABNORMAL
NEUTROPHILS # BLD AUTO: 4 X10E3/UL (ref 1.4–7)
NEUTROPHILS NFR BLD AUTO: 62 %
PLATELET # BLD AUTO: 334 X10E3/UL (ref 150–450)
POTASSIUM SERPL-SCNC: 4.4 MMOL/L (ref 3.5–5.2)
PROT SERPL-MCNC: 7.8 G/DL (ref 6–8.5)
RBC # BLD AUTO: 5.12 X10E6/UL (ref 4.14–5.8)
SODIUM SERPL-SCNC: 138 MMOL/L (ref 134–144)
TIBC SERPL-MCNC: 555 UG/DL (ref 250–450)
UIBC SERPL-MCNC: 531 UG/DL (ref 111–343)
WBC # BLD AUTO: 6.3 X10E3/UL (ref 3.4–10.8)

## 2023-06-29 ENCOUNTER — TELEPHONE (OUTPATIENT)
Age: 43
End: 2023-06-29

## 2023-06-29 DIAGNOSIS — D50.9 IRON DEFICIENCY ANEMIA, UNSPECIFIED IRON DEFICIENCY ANEMIA TYPE: Primary | ICD-10-CM

## 2023-06-29 LAB
FOLATE SERPL-MCNC: 14.7 NG/ML
VIT B12 SERPL-MCNC: 438 PG/ML (ref 232–1245)

## 2023-06-30 ENCOUNTER — TELEPHONE (OUTPATIENT)
Age: 43
End: 2023-06-30

## 2023-07-03 ENCOUNTER — TELEPHONE (OUTPATIENT)
Age: 43
End: 2023-07-03

## 2023-07-03 NOTE — TELEPHONE ENCOUNTER
Pt's wife is calling; pt's wife is returning a phone call that was made on Friday; please call pt's wife back aas she states that she is home with him and if possible the EOD    Thank You

## 2023-07-06 RX ORDER — PANTOPRAZOLE SODIUM 40 MG/1
TABLET, DELAYED RELEASE ORAL
Qty: 90 TABLET | Refills: 3 | Status: SHIPPED | OUTPATIENT
Start: 2023-07-06

## 2023-07-12 ENCOUNTER — CLINICAL DOCUMENTATION (OUTPATIENT)
Age: 43
End: 2023-07-12

## 2023-07-13 ENCOUNTER — TELEPHONE (OUTPATIENT)
Age: 43
End: 2023-07-13

## 2023-07-13 NOTE — TELEPHONE ENCOUNTER
ECC calling; ECC states that pt's wife was calling and had to disconnect; Pt's wife is checking the status of FMLA paperwork; Please give pt's wife a call back       Thank You

## 2023-07-13 NOTE — TELEPHONE ENCOUNTER
verified with pts wife. Informed wife per note from 23 forms were faxed yesterday 23. Wife verified understanding and requested a copy of forms be mailed to her. Verified address on file and will mail a copy of forms to pt. Wife had no further questions.

## 2023-08-18 ENCOUNTER — TELEPHONE (OUTPATIENT)
Age: 43
End: 2023-08-18

## 2023-08-21 NOTE — TELEPHONE ENCOUNTER
Labs are already been ordered
Per pt has an lab appt set for Tuesday 08/18/2023; per pt's wife she would like the pt's hemoglobin and iron checked again      Thank You
no

## 2023-08-22 ENCOUNTER — NURSE ONLY (OUTPATIENT)
Age: 43
End: 2023-08-22

## 2023-08-22 DIAGNOSIS — D50.9 IRON DEFICIENCY ANEMIA, UNSPECIFIED IRON DEFICIENCY ANEMIA TYPE: ICD-10-CM

## 2023-08-23 ENCOUNTER — TELEPHONE (OUTPATIENT)
Age: 43
End: 2023-08-23

## 2023-08-23 LAB
BASOPHILS # BLD AUTO: 0 X10E3/UL (ref 0–0.2)
BASOPHILS NFR BLD AUTO: 0 %
EOSINOPHIL # BLD AUTO: 0.1 X10E3/UL (ref 0–0.4)
EOSINOPHIL NFR BLD AUTO: 1 %
ERYTHROCYTE [DISTWIDTH] IN BLOOD BY AUTOMATED COUNT: 19.2 % (ref 11.6–15.4)
FERRITIN SERPL-MCNC: 27 NG/ML (ref 30–400)
HCT VFR BLD AUTO: 34.6 % (ref 37.5–51)
HGB BLD-MCNC: 11.2 G/DL (ref 13–17.7)
IMM GRANULOCYTES # BLD AUTO: 0 X10E3/UL (ref 0–0.1)
IMM GRANULOCYTES NFR BLD AUTO: 0 %
IRON SATN MFR SERPL: 72 % (ref 15–55)
IRON SERPL-MCNC: 311 UG/DL (ref 38–169)
LYMPHOCYTES # BLD AUTO: 1.5 X10E3/UL (ref 0.7–3.1)
LYMPHOCYTES NFR BLD AUTO: 19 %
MCH RBC QN AUTO: 25.6 PG (ref 26.6–33)
MCHC RBC AUTO-ENTMCNC: 32.4 G/DL (ref 31.5–35.7)
MCV RBC AUTO: 79 FL (ref 79–97)
MONOCYTES # BLD AUTO: 0.6 X10E3/UL (ref 0.1–0.9)
MONOCYTES NFR BLD AUTO: 8 %
NEUTROPHILS # BLD AUTO: 5.5 X10E3/UL (ref 1.4–7)
NEUTROPHILS NFR BLD AUTO: 72 %
PLATELET # BLD AUTO: 161 X10E3/UL (ref 150–450)
RBC # BLD AUTO: 4.37 X10E6/UL (ref 4.14–5.8)
TIBC SERPL-MCNC: 433 UG/DL (ref 250–450)
UIBC SERPL-MCNC: 122 UG/DL (ref 111–343)
WBC # BLD AUTO: 7.7 X10E3/UL (ref 3.4–10.8)

## 2023-08-23 NOTE — TELEPHONE ENCOUNTER
Labs reviewed. Blood counts are about the same as they were a month ago but you have significantly less iron in your body. You are iron deficient once again. Would recommend an iron infusion. You may become anemic soon if iron is not replaced    Where did the iron go? Did you have another bleeding event?

## 2023-08-24 ENCOUNTER — TELEPHONE (OUTPATIENT)
Age: 43
End: 2023-08-24

## 2023-08-25 ENCOUNTER — TELEPHONE (OUTPATIENT)
Age: 43
End: 2023-08-25

## 2023-08-25 NOTE — TELEPHONE ENCOUNTER
What to do:    Keep follow-up with gastroenterology  Keep follow-up with hematologist      Google foods that contain iron. There are many.   Increase intake of the ones that you would like to eat

## 2023-08-25 NOTE — TELEPHONE ENCOUNTER
Wife states yes pt did have another bleeding episode with blood in his stool. [facility] She states since the bleeding event pt has been very fatigue and having a hard time shaking back.  Wife would like to know if theres anything at home that she could be doing to help elevated pt's iron level

## 2023-09-08 ENCOUNTER — CLINICAL DOCUMENTATION (OUTPATIENT)
Age: 43
End: 2023-09-08

## 2023-09-13 ENCOUNTER — HOSPITAL ENCOUNTER (EMERGENCY)
Facility: HOSPITAL | Age: 43
Discharge: HOME | End: 2023-09-13
Attending: EMERGENCY MEDICINE
Payer: COMMERCIAL

## 2023-09-13 ENCOUNTER — OFFICE VISIT (OUTPATIENT)
Age: 43
End: 2023-09-13
Payer: COMMERCIAL

## 2023-09-13 ENCOUNTER — NURSE TRIAGE (OUTPATIENT)
Dept: OTHER | Facility: CLINIC | Age: 43
End: 2023-09-13

## 2023-09-13 VITALS
DIASTOLIC BLOOD PRESSURE: 74 MMHG | SYSTOLIC BLOOD PRESSURE: 107 MMHG | OXYGEN SATURATION: 97 % | HEART RATE: 94 BPM | WEIGHT: 145 LBS | BODY MASS INDEX: 20.81 KG/M2 | TEMPERATURE: 98.4 F | RESPIRATION RATE: 18 BRPM

## 2023-09-13 VITALS
DIASTOLIC BLOOD PRESSURE: 90 MMHG | TEMPERATURE: 98.6 F | HEART RATE: 88 BPM | SYSTOLIC BLOOD PRESSURE: 123 MMHG | RESPIRATION RATE: 18 BRPM | OXYGEN SATURATION: 99 %

## 2023-09-13 DIAGNOSIS — T81.30XA WOUND DEHISCENCE: Primary | ICD-10-CM

## 2023-09-13 DIAGNOSIS — S21.211D: Primary | ICD-10-CM

## 2023-09-13 PROCEDURE — 6370000000 HC RX 637 (ALT 250 FOR IP): Performed by: PHYSICIAN ASSISTANT

## 2023-09-13 PROCEDURE — 99213 OFFICE O/P EST LOW 20 MIN: CPT | Performed by: NURSE PRACTITIONER

## 2023-09-13 PROCEDURE — 3074F SYST BP LT 130 MM HG: CPT | Performed by: NURSE PRACTITIONER

## 2023-09-13 PROCEDURE — 3078F DIAST BP <80 MM HG: CPT | Performed by: NURSE PRACTITIONER

## 2023-09-13 PROCEDURE — 99283 EMERGENCY DEPT VISIT LOW MDM: CPT

## 2023-09-13 RX ORDER — DOXYCYCLINE HYCLATE 100 MG
100 TABLET ORAL 2 TIMES DAILY
Qty: 20 TABLET | Refills: 0 | Status: SHIPPED | OUTPATIENT
Start: 2023-09-13 | End: 2023-09-23

## 2023-09-13 RX ORDER — ONDANSETRON 4 MG/1
4 TABLET, ORALLY DISINTEGRATING ORAL ONCE
Status: COMPLETED | OUTPATIENT
Start: 2023-09-13 | End: 2023-09-13

## 2023-09-13 RX ORDER — HYDROCODONE BITARTRATE AND ACETAMINOPHEN 5; 325 MG/1; MG/1
1 TABLET ORAL EVERY 6 HOURS PRN
Qty: 5 TABLET | Refills: 0 | Status: SHIPPED | OUTPATIENT
Start: 2023-09-13 | End: 2023-09-16

## 2023-09-13 RX ORDER — HYDROCODONE BITARTRATE AND ACETAMINOPHEN 5; 325 MG/1; MG/1
1 TABLET ORAL
Status: COMPLETED | OUTPATIENT
Start: 2023-09-13 | End: 2023-09-13

## 2023-09-13 RX ORDER — DOXYCYCLINE HYCLATE 100 MG
100 TABLET ORAL EVERY 12 HOURS SCHEDULED
Status: DISCONTINUED | OUTPATIENT
Start: 2023-09-13 | End: 2023-09-13 | Stop reason: HOSPADM

## 2023-09-13 RX ADMIN — DOXYCYCLINE HYCLATE 100 MG: 100 TABLET, COATED ORAL at 19:45

## 2023-09-13 RX ADMIN — ONDANSETRON 4 MG: 4 TABLET, ORALLY DISINTEGRATING ORAL at 19:45

## 2023-09-13 RX ADMIN — HYDROCODONE BITARTRATE AND ACETAMINOPHEN 1 TABLET: 5; 325 TABLET ORAL at 19:45

## 2023-09-13 ASSESSMENT — PATIENT HEALTH QUESTIONNAIRE - PHQ9
SUM OF ALL RESPONSES TO PHQ QUESTIONS 1-9: 0
SUM OF ALL RESPONSES TO PHQ QUESTIONS 1-9: 0
1. LITTLE INTEREST OR PLEASURE IN DOING THINGS: 0
2. FEELING DOWN, DEPRESSED OR HOPELESS: 0
SUM OF ALL RESPONSES TO PHQ9 QUESTIONS 1 & 2: 0
SUM OF ALL RESPONSES TO PHQ QUESTIONS 1-9: 0
SUM OF ALL RESPONSES TO PHQ QUESTIONS 1-9: 0

## 2023-09-13 NOTE — DISCHARGE INSTRUCTIONS
Antibiotics as prescribed  Alternate Motrin and Tylenol as directed, as needed for mild-moderate pain  Lortab as directed, as needed for severe pain (be careful this medication may cause drowsiness, therefore do not take with other sedating substances), you can also consider taking MiraLAX or Colace/stool softeners for concern of side effects of narcotics to include constipation    Wound care/wet-to-dry dressings as we discussed  Wound check in the next 48 hours, although sooner return precautions as we discussed    Thank You! It was a pleasure taking care of you in our Emergency Department today. We know that when you come to [facility], you are entrusting us with your health, comfort, and safety. Our clinicians honor that trust, and truly appreciate the opportunity to care for you and your loved ones. We also value your feedback. If you receive a survey about your Emergency Department experience today, please fill it out. We care about our patients' feedback, and we listen to what you have to say. Thank you.     Shereen Urbano PA-C
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
## 2023-09-13 NOTE — PROGRESS NOTES
Chief Complaint   Patient presents with    Follow-up     Stitches on lower back. Health Maintenance Due   Topic Date Due    Hepatitis B vaccine (1 of 3 - 3-dose series) Never done    Varicella vaccine (1 of 2 - 2-dose childhood series) Never done    Pneumococcal 0-64 years Vaccine (1 - PCV) Never done    HIV screen  Never done    Hepatitis C screen  Never done    DTaP/Tdap/Td vaccine (1 - Tdap) Never done    COVID-19 Vaccine (2 - Booster for Markus series) 06/01/2021    Flu vaccine (1) Never done           1. \"Have you been to the ER, urgent care clinic since your last visit? Hospitalized since your last visit? \" ER for stitches    2. \"Have you seen or consulted any other health care providers outside of the [address] [place] [address] since your last visit? \" No     3. For patients aged 43-73: Has the patient had a colonoscopy / FIT/ Cologuard? NA - based on age      If the patient is female:    4. For patients aged 43-66: Has the patient had a mammogram within the past 2 years? NA - based on age or sex      11. For patients aged 21-65: Has the patient had a pap smear?  NA - based on age or sex
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
## 2023-09-16 ENCOUNTER — HOSPITAL ENCOUNTER (EMERGENCY)
Facility: HOSPITAL | Age: 43
Discharge: HOME | End: 2023-09-16
Payer: COMMERCIAL

## 2023-09-16 VITALS
HEART RATE: 69 BPM | TEMPERATURE: 98.4 F | OXYGEN SATURATION: 99 % | RESPIRATION RATE: 14 BRPM | DIASTOLIC BLOOD PRESSURE: 89 MMHG | BODY MASS INDEX: 20.7 KG/M2 | HEIGHT: 70 IN | SYSTOLIC BLOOD PRESSURE: 116 MMHG | WEIGHT: 144.62 LBS

## 2023-09-16 DIAGNOSIS — T81.30XA WOUND DEHISCENCE: Primary | ICD-10-CM

## 2023-09-16 PROCEDURE — 99283 EMERGENCY DEPT VISIT LOW MDM: CPT

## 2023-09-16 PROCEDURE — 6370000000 HC RX 637 (ALT 250 FOR IP)

## 2023-09-16 RX ORDER — HYDROCODONE BITARTRATE AND ACETAMINOPHEN 5; 325 MG/1; MG/1
1 TABLET ORAL
Status: COMPLETED | OUTPATIENT
Start: 2023-09-16 | End: 2023-09-16

## 2023-09-16 RX ADMIN — HYDROCODONE BITARTRATE AND ACETAMINOPHEN 1 TABLET: 5; 325 TABLET ORAL at 14:17

## 2023-09-16 ASSESSMENT — PAIN SCALES - GENERAL
PAINLEVEL_OUTOF10: 8
PAINLEVEL_OUTOF10: 7

## 2023-09-16 ASSESSMENT — PAIN DESCRIPTION - LOCATION: LOCATION: BACK

## 2023-09-16 ASSESSMENT — PAIN - FUNCTIONAL ASSESSMENT: PAIN_FUNCTIONAL_ASSESSMENT: ACTIVITIES ARE NOT PREVENTED

## 2023-09-16 ASSESSMENT — PAIN DESCRIPTION - DESCRIPTORS: DESCRIPTORS: SORE

## 2023-09-16 ASSESSMENT — PAIN DESCRIPTION - ORIENTATION: ORIENTATION: RIGHT;LOWER

## 2023-09-21 ENCOUNTER — HOSPITAL ENCOUNTER (OUTPATIENT)
Facility: HOSPITAL | Age: 43
Discharge: HOME | End: 2023-09-21
Payer: COMMERCIAL

## 2023-09-21 VITALS
HEART RATE: 75 BPM | TEMPERATURE: 98 F | DIASTOLIC BLOOD PRESSURE: 87 MMHG | RESPIRATION RATE: 16 BRPM | SYSTOLIC BLOOD PRESSURE: 148 MMHG

## 2023-09-21 DIAGNOSIS — S21.201A WOUND OF RIGHT SIDE OF BACK, INITIAL ENCOUNTER: Primary | ICD-10-CM

## 2023-09-21 PROCEDURE — 99203 OFFICE O/P NEW LOW 30 MIN: CPT | Performed by: SURGERY

## 2023-09-21 PROCEDURE — 99213 OFFICE O/P EST LOW 20 MIN: CPT

## 2023-09-21 RX ORDER — LIDOCAINE 40 MG/G
CREAM TOPICAL ONCE
Status: CANCELLED | OUTPATIENT
Start: 2023-09-21 | End: 2023-09-21

## 2023-09-21 RX ORDER — LIDOCAINE HYDROCHLORIDE 20 MG/ML
JELLY TOPICAL ONCE
OUTPATIENT
Start: 2023-09-21 | End: 2023-09-21

## 2023-09-21 RX ORDER — TRIAMCINOLONE ACETONIDE 1 MG/G
OINTMENT TOPICAL ONCE
Status: CANCELLED | OUTPATIENT
Start: 2023-09-21 | End: 2023-09-21

## 2023-09-21 RX ORDER — LIDOCAINE 40 MG/G
CREAM TOPICAL ONCE
OUTPATIENT
Start: 2023-09-21 | End: 2023-09-21

## 2023-09-21 RX ORDER — LIDOCAINE 50 MG/G
OINTMENT TOPICAL ONCE
Status: CANCELLED | OUTPATIENT
Start: 2023-09-21 | End: 2023-09-21

## 2023-09-21 RX ORDER — GENTAMICIN SULFATE 1 MG/G
OINTMENT TOPICAL ONCE
Status: CANCELLED | OUTPATIENT
Start: 2023-09-21 | End: 2023-09-21

## 2023-09-21 RX ORDER — LIDOCAINE 50 MG/G
OINTMENT TOPICAL ONCE
OUTPATIENT
Start: 2023-09-21 | End: 2023-09-21

## 2023-09-21 RX ORDER — GINSENG 100 MG
CAPSULE ORAL ONCE
Status: CANCELLED | OUTPATIENT
Start: 2023-09-21 | End: 2023-09-21

## 2023-09-21 RX ORDER — LIDOCAINE HYDROCHLORIDE 20 MG/ML
JELLY TOPICAL ONCE
Status: CANCELLED | OUTPATIENT
Start: 2023-09-21 | End: 2023-09-21

## 2023-09-21 RX ORDER — BACITRACIN ZINC AND POLYMYXIN B SULFATE 500; 1000 [USP'U]/G; [USP'U]/G
OINTMENT TOPICAL ONCE
OUTPATIENT
Start: 2023-09-21 | End: 2023-09-21

## 2023-09-21 RX ORDER — LIDOCAINE HYDROCHLORIDE 40 MG/ML
SOLUTION TOPICAL ONCE
OUTPATIENT
Start: 2023-09-21 | End: 2023-09-21

## 2023-09-21 RX ORDER — IBUPROFEN 200 MG
TABLET ORAL ONCE
Status: CANCELLED | OUTPATIENT
Start: 2023-09-21 | End: 2023-09-21

## 2023-09-21 RX ORDER — GENTAMICIN SULFATE 1 MG/G
OINTMENT TOPICAL ONCE
OUTPATIENT
Start: 2023-09-21 | End: 2023-09-21

## 2023-09-21 RX ORDER — BETAMETHASONE DIPROPIONATE 0.05 %
OINTMENT (GRAM) TOPICAL ONCE
OUTPATIENT
Start: 2023-09-21 | End: 2023-09-21

## 2023-09-21 RX ORDER — CLOBETASOL PROPIONATE 0.5 MG/G
OINTMENT TOPICAL ONCE
Status: CANCELLED | OUTPATIENT
Start: 2023-09-21 | End: 2023-09-21

## 2023-09-21 RX ORDER — SODIUM CHLOR/HYPOCHLOROUS ACID 0.033 %
SOLUTION, IRRIGATION IRRIGATION ONCE
Status: CANCELLED | OUTPATIENT
Start: 2023-09-21 | End: 2023-09-21

## 2023-09-21 RX ORDER — IBUPROFEN 200 MG
TABLET ORAL ONCE
OUTPATIENT
Start: 2023-09-21 | End: 2023-09-21

## 2023-09-21 RX ORDER — BACITRACIN ZINC AND POLYMYXIN B SULFATE 500; 1000 [USP'U]/G; [USP'U]/G
OINTMENT TOPICAL ONCE
Status: CANCELLED | OUTPATIENT
Start: 2023-09-21 | End: 2023-09-21

## 2023-09-21 RX ORDER — GINSENG 100 MG
CAPSULE ORAL ONCE
OUTPATIENT
Start: 2023-09-21 | End: 2023-09-21

## 2023-09-21 RX ORDER — SODIUM CHLOR/HYPOCHLOROUS ACID 0.033 %
SOLUTION, IRRIGATION IRRIGATION ONCE
OUTPATIENT
Start: 2023-09-21 | End: 2023-09-21

## 2023-09-21 RX ORDER — BETAMETHASONE DIPROPIONATE 0.05 %
OINTMENT (GRAM) TOPICAL ONCE
Status: CANCELLED | OUTPATIENT
Start: 2023-09-21 | End: 2023-09-21

## 2023-09-21 RX ORDER — TRIAMCINOLONE ACETONIDE 1 MG/G
OINTMENT TOPICAL ONCE
OUTPATIENT
Start: 2023-09-21 | End: 2023-09-21

## 2023-09-21 RX ORDER — LIDOCAINE HYDROCHLORIDE 40 MG/ML
SOLUTION TOPICAL ONCE
Status: CANCELLED | OUTPATIENT
Start: 2023-09-21 | End: 2023-09-21

## 2023-09-21 RX ORDER — CLOBETASOL PROPIONATE 0.5 MG/G
OINTMENT TOPICAL ONCE
OUTPATIENT
Start: 2023-09-21 | End: 2023-09-21

## 2023-09-21 NOTE — DISCHARGE INSTRUCTIONS
Discharge Instructions/Wound Care Orders  [address] [place] [address] [facility] [address]  Telephone: [phone]     Wound Care Orders:  Right back :Cleanse with soap and water, apply primary dressing Packing gauze  cover with secondary dressing Gauze and Tape . Pt./pcg/HH nurse to change (freq) Daily and as needed for compromise. F/U in 2 week. Follow diet as prescribed:  [x] Diet as tolerated: [] Diabetic Diet: Low carb no sugar [] No Added Salt:  [] Increase Protein: [] Other:Limit the amount of liquid you are drinking and avoid drinking in between meals             Follow-up:  [x] Return Appointment: With Dr. Susan Weaver in  [address] [place] [address])  [] Ordered tests:    Electronically signed Shakira De Santiago RN on 9/21/2023 at 8:44 [address]: Should you experience any significant changes in your wound(s) or have questions about your wound care, please contact the [address] [place] [address] at  [facility] [place] 8:00 am - 4:30. If you need help with your wound outside these hours and cannot wait until we are again available, contact your PCP or go to the hospital emergency room. PLEASE NOTE: IF YOU ARE UNABLE TO OBTAIN WOUND SUPPLIES, CONTINUE TO USE THE SUPPLIES YOU HAVE AVAILABLE UNTIL YOU ARE ABLE TO REACH US. IT IS MOST IMPORTANT TO KEEP THE WOUND COVERED AT ALL TIMES.      Physician Signature:_______________________    Date: ___________ Time:  ____________

## 2023-09-21 NOTE — FLOWSHEET NOTE
09/21/23 0842   Wound 09/16/23 Back Lower;Right   Date First Assessed: 09/16/23   Present on Original Admission: Yes  Primary Wound Type: Venous Ulcer  Location: Back  Wound Location Orientation: Lower;Right   Dressing Status New dressing applied   Dressing/Treatment Packing;Gauze dressing/dressing sponge;Tape/Soft cloth adhesive tape   Distance Tunneling (cm) 1 cm   Tunneling Position ___ O'Clock 9

## 2023-09-21 NOTE — PLAN OF CARE
thickness 09/21/23 0825   Number of days: 5          Supplies Requested :      WOUND #: 1   PRIMARY DRESSING:  Iodoform packing strip    Cover and Secure with: 4X4 gauze pad  Other Tape     FREQUENCY OF DRESSING CHANGES:  Daily           ADDITIONAL ITEMS:  [] Gloves Small  [x] Gloves Medium [] Gloves Large [] Gloves XLarge  [] Tape 1\" [x] Tape 2\" [] Tape 3\"  [x] Medipore Tape  [x] Saline  [] Vashe  [] Skin Prep   [] Adhesive Remover   [x] Cotton Tip Applicators   [] Other:    Patient Wound(s) Debrided: [x] Yes if yes please add date 9/21/23   [] No    Debribement Type: Mechanical     Patient currently being seen by Home Health: [] Yes   [x] No    Duration for needed supplies:  [x]15  []30  []60  []90 Days    Electronically signed by Cadence Mccain RN on 9/21/2023 at 8:56 AM     Provider Information:      PROVIDER'S NAME: Dr. Deni Butts NPI # [id number]
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
## 2023-10-06 ENCOUNTER — HOSPITAL ENCOUNTER (OUTPATIENT)
Facility: HOSPITAL | Age: 43
Discharge: HOME | End: 2023-10-06
Attending: SURGERY
Payer: COMMERCIAL

## 2023-10-06 VITALS — TEMPERATURE: 97.5 F | RESPIRATION RATE: 16 BRPM

## 2023-10-06 DIAGNOSIS — S21.201A WOUND OF RIGHT SIDE OF BACK, INITIAL ENCOUNTER: Primary | ICD-10-CM

## 2023-10-06 PROCEDURE — 99213 OFFICE O/P EST LOW 20 MIN: CPT | Performed by: SURGERY

## 2023-10-06 PROCEDURE — 99212 OFFICE O/P EST SF 10 MIN: CPT

## 2023-10-06 RX ORDER — LIDOCAINE HYDROCHLORIDE 20 MG/ML
JELLY TOPICAL ONCE
Status: CANCELLED | OUTPATIENT
Start: 2023-10-06 | End: 2023-10-06

## 2023-10-06 RX ORDER — LIDOCAINE 40 MG/G
CREAM TOPICAL ONCE
Status: CANCELLED | OUTPATIENT
Start: 2023-10-06 | End: 2023-10-06

## 2023-10-06 RX ORDER — LIDOCAINE HYDROCHLORIDE 40 MG/ML
SOLUTION TOPICAL ONCE
Status: CANCELLED | OUTPATIENT
Start: 2023-10-06 | End: 2023-10-06

## 2023-10-06 RX ORDER — TRIAMCINOLONE ACETONIDE 1 MG/G
OINTMENT TOPICAL ONCE
Status: CANCELLED | OUTPATIENT
Start: 2023-10-06 | End: 2023-10-06

## 2023-10-06 RX ORDER — IBUPROFEN 200 MG
TABLET ORAL ONCE
Status: CANCELLED | OUTPATIENT
Start: 2023-10-06 | End: 2023-10-06

## 2023-10-06 RX ORDER — BACITRACIN ZINC AND POLYMYXIN B SULFATE 500; 1000 [USP'U]/G; [USP'U]/G
OINTMENT TOPICAL ONCE
Status: CANCELLED | OUTPATIENT
Start: 2023-10-06 | End: 2023-10-06

## 2023-10-06 RX ORDER — GINSENG 100 MG
CAPSULE ORAL ONCE
Status: CANCELLED | OUTPATIENT
Start: 2023-10-06 | End: 2023-10-06

## 2023-10-06 RX ORDER — BETAMETHASONE DIPROPIONATE 0.05 %
OINTMENT (GRAM) TOPICAL ONCE
Status: CANCELLED | OUTPATIENT
Start: 2023-10-06 | End: 2023-10-06

## 2023-10-06 RX ORDER — LIDOCAINE 50 MG/G
OINTMENT TOPICAL ONCE
Status: CANCELLED | OUTPATIENT
Start: 2023-10-06 | End: 2023-10-06

## 2023-10-06 RX ORDER — GENTAMICIN SULFATE 1 MG/G
OINTMENT TOPICAL ONCE
Status: CANCELLED | OUTPATIENT
Start: 2023-10-06 | End: 2023-10-06

## 2023-10-06 RX ORDER — CLOBETASOL PROPIONATE 0.5 MG/G
OINTMENT TOPICAL ONCE
Status: CANCELLED | OUTPATIENT
Start: 2023-10-06 | End: 2023-10-06

## 2023-10-06 RX ORDER — SODIUM CHLOR/HYPOCHLOROUS ACID 0.033 %
SOLUTION, IRRIGATION IRRIGATION ONCE
Status: CANCELLED | OUTPATIENT
Start: 2023-10-06 | End: 2023-10-06
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
## 2023-10-06 NOTE — DISCHARGE INSTRUCTIONS
Discharge Instructions/Wound Care Orders  [address] [place] [address] [facility] [address]  Telephone: [phone]     Wound Care Orders:  Lower back wound has healed  : You may shower, keep it covered with bandaid x 1 week. . Call and come back to clinic should there be any problems   Treatment Orders:   Elevate leg(s) above the level of the heart when sitting, if swelling present. Avoid prolonged standing in one place. Wear off-loading shoe/boot on the affected foot when walking. Do not get dressing/cast wet. Do not use objects to scratch inside cast/wrap. Follow diet as prescribed:  [] Diet as tolerated: [] Diabetic Diet: Low carb no sugar [] No Added Salt:  [] Increase Protein: [] Other:Limit the amount of liquid you are drinking and avoid drinking in between meals             Follow-up:  [x] Return Appointment: With Dr. Danitza Edwards not needed  [] Ordered tests:    Electronically signed Dick Butts RN on 10/6/2023 at 11:24 AM     [address]: Should you experience any significant changes in your wound(s) or have questions about your wound care, please contact the [address] [place] [address] at [facility] 8:00 am - 4:30. If you need help with your wound outside these hours and cannot wait until we are again available, contact your PCP or go to the hospital emergency room. PLEASE NOTE: IF YOU ARE UNABLE TO OBTAIN WOUND SUPPLIES, CONTINUE TO USE THE SUPPLIES YOU HAVE AVAILABLE UNTIL YOU ARE ABLE TO REACH US. IT IS MOST IMPORTANT TO KEEP THE WOUND COVERED AT ALL TIMES.      Physician Signature:_______________________    Date: ___________ Time:  ____________

## 2023-10-10 ENCOUNTER — TELEPHONE (OUTPATIENT)
Age: 43
End: 2023-10-10

## 2023-10-10 NOTE — TELEPHONE ENCOUNTER
Pt spouse is calling to have us follow up with Tashia Devi [phone] (phone)    reference number  [id number]

## 2023-10-11 NOTE — TELEPHONE ENCOUNTER
Giselle Bennett states she needed us to contact [address] in reference to patient's disability forms. Spoke with Jean-Claude Umana from [address] and she states they needed additional information from Dr. Gracy Bear but couldn't tell me what additional information was needed. Jean-Claude Umana states she will send a message to the person in charge of this case and have him contact our office with the additional information.     Wife notified

## 2023-10-23 ENCOUNTER — OFFICE VISIT (OUTPATIENT)
Age: 43
End: 2023-10-23
Payer: COMMERCIAL

## 2023-10-23 VITALS
HEART RATE: 76 BPM | OXYGEN SATURATION: 98 % | BODY MASS INDEX: 21.09 KG/M2 | TEMPERATURE: 98.7 F | SYSTOLIC BLOOD PRESSURE: 137 MMHG | DIASTOLIC BLOOD PRESSURE: 86 MMHG | WEIGHT: 147 LBS

## 2023-10-23 DIAGNOSIS — Z86.2 HISTORY OF ANEMIA: ICD-10-CM

## 2023-10-23 DIAGNOSIS — I10 ACCELERATED HYPERTENSION: ICD-10-CM

## 2023-10-23 DIAGNOSIS — Z00.00 ENCOUNTER FOR WELL ADULT EXAM WITHOUT ABNORMAL FINDINGS: Primary | ICD-10-CM

## 2023-10-23 DIAGNOSIS — E78.2 MIXED HYPERLIPIDEMIA: ICD-10-CM

## 2023-10-23 PROCEDURE — 99396 PREV VISIT EST AGE 40-64: CPT | Performed by: FAMILY MEDICINE

## 2023-10-23 PROCEDURE — 3079F DIAST BP 80-89 MM HG: CPT | Performed by: FAMILY MEDICINE

## 2023-10-23 PROCEDURE — 3075F SYST BP GE 130 - 139MM HG: CPT | Performed by: FAMILY MEDICINE

## 2023-10-23 SDOH — ECONOMIC STABILITY: INCOME INSECURITY: HOW HARD IS IT FOR YOU TO PAY FOR THE VERY BASICS LIKE FOOD, HOUSING, MEDICAL CARE, AND HEATING?: NOT HARD AT ALL

## 2023-10-23 SDOH — ECONOMIC STABILITY: FOOD INSECURITY: WITHIN THE PAST 12 MONTHS, YOU WORRIED THAT YOUR FOOD WOULD RUN OUT BEFORE YOU GOT MONEY TO BUY MORE.: NEVER TRUE

## 2023-10-23 SDOH — ECONOMIC STABILITY: HOUSING INSECURITY
IN THE LAST 12 MONTHS, WAS THERE A TIME WHEN YOU DID NOT HAVE A STEADY PLACE TO SLEEP OR SLEPT IN A SHELTER (INCLUDING NOW)?: NO

## 2023-10-23 SDOH — ECONOMIC STABILITY: FOOD INSECURITY: WITHIN THE PAST 12 MONTHS, THE FOOD YOU BOUGHT JUST DIDN'T LAST AND YOU DIDN'T HAVE MONEY TO GET MORE.: NEVER TRUE

## 2023-10-23 ASSESSMENT — PATIENT HEALTH QUESTIONNAIRE - PHQ9
SUM OF ALL RESPONSES TO PHQ QUESTIONS 1-9: 0
SUM OF ALL RESPONSES TO PHQ QUESTIONS 1-9: 0
2. FEELING DOWN, DEPRESSED OR HOPELESS: 0
SUM OF ALL RESPONSES TO PHQ9 QUESTIONS 1 & 2: 0
SUM OF ALL RESPONSES TO PHQ QUESTIONS 1-9: 0
1. LITTLE INTEREST OR PLEASURE IN DOING THINGS: 0
SUM OF ALL RESPONSES TO PHQ QUESTIONS 1-9: 0

## 2023-10-23 ASSESSMENT — ENCOUNTER SYMPTOMS
NAUSEA: 1
DIARRHEA: 1

## 2023-10-24 ENCOUNTER — TELEPHONE (OUTPATIENT)
Age: 43
End: 2023-10-24

## 2023-10-24 LAB
ALBUMIN SERPL-MCNC: 4.4 G/DL (ref 4.1–5.1)
ALBUMIN/GLOB SERPL: 1.9 {RATIO} (ref 1.2–2.2)
ALP SERPL-CCNC: 48 IU/L (ref 44–121)
ALT SERPL-CCNC: 14 IU/L (ref 0–44)
AST SERPL-CCNC: 39 IU/L (ref 0–40)
BASOPHILS # BLD AUTO: 0 X10E3/UL (ref 0–0.2)
BASOPHILS NFR BLD AUTO: 1 %
BILIRUB SERPL-MCNC: <0.2 MG/DL (ref 0–1.2)
BUN SERPL-MCNC: 5 MG/DL (ref 6–24)
BUN/CREAT SERPL: 7 (ref 9–20)
CALCIUM SERPL-MCNC: 9.2 MG/DL (ref 8.7–10.2)
CHLORIDE SERPL-SCNC: 101 MMOL/L (ref 96–106)
CHOLEST SERPL-MCNC: 170 MG/DL (ref 100–199)
CO2 SERPL-SCNC: 21 MMOL/L (ref 20–29)
CREAT SERPL-MCNC: 0.75 MG/DL (ref 0.76–1.27)
EGFRCR SERPLBLD CKD-EPI 2021: 116 ML/MIN/1.73
EOSINOPHIL # BLD AUTO: 0.1 X10E3/UL (ref 0–0.4)
EOSINOPHIL NFR BLD AUTO: 2 %
ERYTHROCYTE [DISTWIDTH] IN BLOOD BY AUTOMATED COUNT: 18.4 % (ref 11.6–15.4)
GLOBULIN SER CALC-MCNC: 2.3 G/DL (ref 1.5–4.5)
GLUCOSE SERPL-MCNC: 65 MG/DL (ref 70–99)
HBA1C MFR BLD: 5 % (ref 4.8–5.6)
HCT VFR BLD AUTO: 31.9 % (ref 37.5–51)
HDLC SERPL-MCNC: 59 MG/DL
HGB BLD-MCNC: 10.2 G/DL (ref 13–17.7)
HIV 1+2 AB+HIV1 P24 AG SERPL QL IA: NON REACTIVE
IMM GRANULOCYTES # BLD AUTO: 0 X10E3/UL (ref 0–0.1)
IMM GRANULOCYTES NFR BLD AUTO: 0 %
IRON SATN MFR SERPL: 2 % (ref 15–55)
IRON SERPL-MCNC: 11 UG/DL (ref 38–169)
LDLC SERPL CALC-MCNC: 98 MG/DL (ref 0–99)
LYMPHOCYTES # BLD AUTO: 1.1 X10E3/UL (ref 0.7–3.1)
LYMPHOCYTES NFR BLD AUTO: 29 %
MCH RBC QN AUTO: 26 PG (ref 26.6–33)
MCHC RBC AUTO-ENTMCNC: 32 G/DL (ref 31.5–35.7)
MCV RBC AUTO: 81 FL (ref 79–97)
MONOCYTES # BLD AUTO: 0.5 X10E3/UL (ref 0.1–0.9)
MONOCYTES NFR BLD AUTO: 14 %
NEUTROPHILS # BLD AUTO: 2 X10E3/UL (ref 1.4–7)
NEUTROPHILS NFR BLD AUTO: 54 %
PLATELET # BLD AUTO: 167 X10E3/UL (ref 150–450)
POTASSIUM SERPL-SCNC: 3.8 MMOL/L (ref 3.5–5.2)
PROT SERPL-MCNC: 6.7 G/DL (ref 6–8.5)
RBC # BLD AUTO: 3.93 X10E6/UL (ref 4.14–5.8)
SODIUM SERPL-SCNC: 138 MMOL/L (ref 134–144)
TIBC SERPL-MCNC: 449 UG/DL (ref 250–450)
TRIGL SERPL-MCNC: 70 MG/DL (ref 0–149)
TSH SERPL DL<=0.005 MIU/L-ACNC: 1 UIU/ML (ref 0.45–4.5)
UIBC SERPL-MCNC: 438 UG/DL (ref 111–343)
VLDLC SERPL CALC-MCNC: 13 MG/DL (ref 5–40)
WBC # BLD AUTO: 3.7 X10E3/UL (ref 3.4–10.8)

## 2023-10-25 LAB — IMP & REVIEW OF LAB RESULTS: NORMAL

## 2023-12-29 RX ORDER — FENOFIBRATE 145 MG/1
145 TABLET, COATED ORAL DAILY
Qty: 90 TABLET | Refills: 2 | Status: SHIPPED | OUTPATIENT
Start: 2023-12-29

## 2023-12-29 NOTE — TELEPHONE ENCOUNTER
Refill Request: Pt's wife   -Pt has two pills   -Pt's wife is requesting I fthis can be filled today due the long weekend  -Please call pt's wife when Rx has been filled     fenofibrate (TRICOR) 145 MG tablet     [facility] [address], [address] [phone]  [address] [place] [address]  Phone: [phone]  Fax: [phone]     Thank You

## 2024-01-15 ENCOUNTER — TELEPHONE (OUTPATIENT)
Age: 44
End: 2024-01-15

## 2024-01-15 ENCOUNTER — OFFICE VISIT (OUTPATIENT)
Age: 44
End: 2024-01-15
Payer: COMMERCIAL

## 2024-01-15 VITALS
SYSTOLIC BLOOD PRESSURE: 149 MMHG | WEIGHT: 154 LBS | DIASTOLIC BLOOD PRESSURE: 84 MMHG | OXYGEN SATURATION: 98 % | HEART RATE: 90 BPM | RESPIRATION RATE: 18 BRPM | TEMPERATURE: 98.6 F | HEIGHT: 70 IN | BODY MASS INDEX: 22.05 KG/M2

## 2024-01-15 DIAGNOSIS — D50.9 IRON DEFICIENCY ANEMIA, UNSPECIFIED IRON DEFICIENCY ANEMIA TYPE: Primary | ICD-10-CM

## 2024-01-15 DIAGNOSIS — D69.6 THROMBOCYTOPENIA (HCC): ICD-10-CM

## 2024-01-15 DIAGNOSIS — R51.9 ACUTE NONINTRACTABLE HEADACHE, UNSPECIFIED HEADACHE TYPE: ICD-10-CM

## 2024-01-15 DIAGNOSIS — I73.9 CLAUDICATION (HCC): ICD-10-CM

## 2024-01-15 PROBLEM — G89.29 CHRONIC RIGHT SHOULDER PAIN: Status: ACTIVE | Noted: 2023-07-13

## 2024-01-15 PROBLEM — M25.511 CHRONIC RIGHT SHOULDER PAIN: Status: ACTIVE | Noted: 2023-07-13

## 2024-01-15 PROCEDURE — 99214 OFFICE O/P EST MOD 30 MIN: CPT | Performed by: FAMILY MEDICINE

## 2024-01-15 PROCEDURE — 3077F SYST BP >= 140 MM HG: CPT | Performed by: FAMILY MEDICINE

## 2024-01-15 PROCEDURE — 3079F DIAST BP 80-89 MM HG: CPT | Performed by: FAMILY MEDICINE

## 2024-01-15 ASSESSMENT — PATIENT HEALTH QUESTIONNAIRE - PHQ9
SUM OF ALL RESPONSES TO PHQ QUESTIONS 1-9: 0
SUM OF ALL RESPONSES TO PHQ QUESTIONS 1-9: 0
SUM OF ALL RESPONSES TO PHQ9 QUESTIONS 1 & 2: 0
SUM OF ALL RESPONSES TO PHQ QUESTIONS 1-9: 0
2. FEELING DOWN, DEPRESSED OR HOPELESS: 0
SUM OF ALL RESPONSES TO PHQ QUESTIONS 1-9: 0
1. LITTLE INTEREST OR PLEASURE IN DOING THINGS: 0

## 2024-01-15 NOTE — PROGRESS NOTES
JODY  Jhony Azar is a 43 y.o. male who presents with headache  Concerned about his iron.    He has been having headaches for the last 3 days.  These are right between his eyebrows.  Stabbing, thumping, intense.  The headache will come on for 10 minutes at the longest, stop him in his tracks.  During the headache he will also have dizziness, lots of pressure.  His vision will be okay.    He feels like it is soothing to push something against his forehead.  Something cold is usually helpful.    Headache is more likely to come on if he is aggravated by somebody.    Orgasm will always bring a headache on.  This orgasm problems been a problem for 3 days.  In the past he has had a similar problem only with orgasm (not with aggravation).  Last time this happened was in November.  He is unsure of how long he was getting headaches but knows that when he got an iron infusion through [facility] that solved his headache problem apparently.    He does take medicine during headaches.  He will take BC, ibuprofen, Excedrin.  During the last 3 days he has only had 1 BC powder which he took today, he had ibuprofen yesterday.  He and Excedrin 3 days ago    PMHx:  Past Medical History:   Diagnosis Date    Anemia     4/3/19    Bleeding hemorrhoid     Elevated cholesterol     Hypertension     Pancreatitis        Meds:   Current Outpatient Medications   Medication Sig Dispense Refill    fenofibrate (TRICOR) 145 MG tablet Take 1 tablet by mouth daily 90 tablet 2    pantoprazole (PROTONIX) 40 MG tablet Take 1 tablet by mouth once daily 90 tablet 3    ferrous sulfate (IRON 325) 325 (65 Fe) MG tablet Take 1 tablet by mouth daily (with breakfast) 60 tablet 0     No current facility-administered medications for this visit.       Allergies:   Allergies   Allergen Reactions    Peanut-Containing Drug Products Hives       Smoker:  Social History     Tobacco Use   Smoking Status Every Day    Current packs/day: 0.25    Average packs/day: 0.3 packs/day for

## 2024-01-15 NOTE — PROGRESS NOTES
Chief Complaint   Patient presents with    Headache         Health Maintenance Due   Topic Date Due    Varicella vaccine (1 of 2 - 2-dose childhood series) Never done    Hepatitis C screen  Never done    COVID-19 Vaccine (2 - 2023-24 season) 09/01/2023         \"Have you been to the ER, urgent care clinic since your last visit?  Hospitalized since your last visit?\"    NO    “Have you seen or consulted any other health care providers outside of [facility] since your last visit?”    YES - When: approximately 3 months ago.  Where and Why: [facility] Fe infusion.

## 2024-01-15 NOTE — TELEPHONE ENCOUNTER
verified by daughter.    Pt has been having headaches * 3 days.  No other symptoms.  She is concerned about his Fe and Hgb levels.  Pt will be here this afternoon for lab draw.  Last Fe infusion 2023 @ [facility].

## 2024-01-15 NOTE — TELEPHONE ENCOUNTER
Is a stoic historian and has under reported symptoms in the past up to the point where he got quite sick.  Advised to be seen for an appointment with his lab work.  That has been scheduled

## 2024-01-16 ENCOUNTER — TELEPHONE (OUTPATIENT)
Age: 44
End: 2024-01-16

## 2024-01-16 LAB
ALBUMIN SERPL-MCNC: 4.7 G/DL (ref 4.1–5.1)
ALBUMIN/GLOB SERPL: 2 {RATIO} (ref 1.2–2.2)
ALP SERPL-CCNC: 52 IU/L (ref 44–121)
ALT SERPL-CCNC: 10 IU/L (ref 0–44)
AST SERPL-CCNC: 35 IU/L (ref 0–40)
BASOPHILS # BLD AUTO: 0 X10E3/UL (ref 0–0.2)
BASOPHILS NFR BLD AUTO: 1 %
BILIRUB SERPL-MCNC: 0.2 MG/DL (ref 0–1.2)
BUN SERPL-MCNC: 6 MG/DL (ref 6–24)
BUN/CREAT SERPL: 8 (ref 9–20)
CALCIUM SERPL-MCNC: 9.7 MG/DL (ref 8.7–10.2)
CHLORIDE SERPL-SCNC: 103 MMOL/L (ref 96–106)
CO2 SERPL-SCNC: 23 MMOL/L (ref 20–29)
CREAT SERPL-MCNC: 0.78 MG/DL (ref 0.76–1.27)
EGFRCR SERPLBLD CKD-EPI 2021: 114 ML/MIN/1.73
EOSINOPHIL # BLD AUTO: 0.1 X10E3/UL (ref 0–0.4)
EOSINOPHIL NFR BLD AUTO: 2 %
ERYTHROCYTE [DISTWIDTH] IN BLOOD BY AUTOMATED COUNT: 16.9 % (ref 11.6–15.4)
FERRITIN SERPL-MCNC: 77 NG/ML (ref 30–400)
GLOBULIN SER CALC-MCNC: 2.4 G/DL (ref 1.5–4.5)
GLUCOSE SERPL-MCNC: 93 MG/DL (ref 70–99)
HCT VFR BLD AUTO: 44 % (ref 37.5–51)
HGB BLD-MCNC: 14.2 G/DL (ref 13–17.7)
IMM GRANULOCYTES # BLD AUTO: 0 X10E3/UL (ref 0–0.1)
IMM GRANULOCYTES NFR BLD AUTO: 1 %
IRON SATN MFR SERPL: 33 % (ref 15–55)
IRON SERPL-MCNC: 132 UG/DL (ref 38–169)
LYMPHOCYTES # BLD AUTO: 1.5 X10E3/UL (ref 0.7–3.1)
LYMPHOCYTES NFR BLD AUTO: 36 %
MCH RBC QN AUTO: 29 PG (ref 26.6–33)
MCHC RBC AUTO-ENTMCNC: 32.3 G/DL (ref 31.5–35.7)
MCV RBC AUTO: 90 FL (ref 79–97)
MONOCYTES # BLD AUTO: 0.4 X10E3/UL (ref 0.1–0.9)
MONOCYTES NFR BLD AUTO: 9 %
NEUTROPHILS # BLD AUTO: 2.1 X10E3/UL (ref 1.4–7)
NEUTROPHILS NFR BLD AUTO: 51 %
PLATELET # BLD AUTO: 200 X10E3/UL (ref 150–450)
POTASSIUM SERPL-SCNC: 4.7 MMOL/L (ref 3.5–5.2)
PROT SERPL-MCNC: 7.1 G/DL (ref 6–8.5)
RBC # BLD AUTO: 4.9 X10E6/UL (ref 4.14–5.8)
SODIUM SERPL-SCNC: 140 MMOL/L (ref 134–144)
TIBC SERPL-MCNC: 397 UG/DL (ref 250–450)
UIBC SERPL-MCNC: 265 UG/DL (ref 111–343)
WBC # BLD AUTO: 4.1 X10E3/UL (ref 3.4–10.8)

## 2024-01-16 NOTE — TELEPHONE ENCOUNTER
Labs reviewed.    Kidney function is excellent, blood counts are normal.  Iron stores are adequate.  Good news.    Want to see how CT looks.  Need to rule out a blood vessel problem before deciding on a medication for headache

## 2024-01-24 ENCOUNTER — TELEPHONE (OUTPATIENT)
Age: 44
End: 2024-01-24

## 2024-01-24 ENCOUNTER — HOSPITAL ENCOUNTER (OUTPATIENT)
Facility: HOSPITAL | Age: 44
Discharge: HOME | End: 2024-01-27
Payer: COMMERCIAL

## 2024-01-24 DIAGNOSIS — G08 ACUTE CEREBRAL VENOUS SINUS THROMBOSIS: Primary | ICD-10-CM

## 2024-01-24 DIAGNOSIS — G08 ACUTE CEREBRAL VENOUS SINUS THROMBOSIS: ICD-10-CM

## 2024-01-24 DIAGNOSIS — R51.9 ACUTE NONINTRACTABLE HEADACHE, UNSPECIFIED HEADACHE TYPE: ICD-10-CM

## 2024-01-24 DIAGNOSIS — R51.9 NONINTRACTABLE HEADACHE, UNSPECIFIED CHRONICITY PATTERN, UNSPECIFIED HEADACHE TYPE: Primary | ICD-10-CM

## 2024-01-24 PROCEDURE — 70450 CT HEAD/BRAIN W/O DYE: CPT

## 2024-01-24 RX ORDER — GABAPENTIN 300 MG/1
300 CAPSULE ORAL 3 TIMES DAILY
Qty: 90 CAPSULE | Refills: 0 | Status: SHIPPED | OUTPATIENT
Start: 2024-01-24 | End: 2024-02-23

## 2024-01-24 NOTE — TELEPHONE ENCOUNTER
Pt's wife is calling; Pt's wife is checking the status of the call she made earlier; Please call pt's wife back as she is very concerned and needs to know what she should do     Thank You

## 2024-01-24 NOTE — TELEPHONE ENCOUNTER
Returned wife's call.  Patient was not available.    Headaches are getting worse.  There is still lasting for about 10 minutes but they are happening more frequently.  He is waking up in the morning with headache now.  He is irritable    We discussed the possible recurrent cerebral venous thrombosis.  Recurrent thrombosis would require lifelong anticoagulation.  I really want to confirm that he has a new blood clot before signing him up for that.  There is a high risk of anemia in this patient.  He has a recent history of profound anemia    He is anxious and uncomfortable.  He has a history of headaches with dural venous thrombosis and the only thing that helped him relieve the headache was Coumadin.  It would not be unreasonable to start blood thinner at this point if he really felt it was necessary.  After some discussion we decided to wait until MRI results.    Will try gabapentin for the brief bursts of headache    Routine MRI was scheduled for mid February.  Given the worsening of his headaches I think a more urgent evaluation is warranted.  Radiologist recommends MRI brain with contrast plus MRV brain with contrast    If confirmed to have a recurrent clot would treat with Lovenox and then transition to either Coumadin or Xarelto based on his preference

## 2024-01-24 NOTE — TELEPHONE ENCOUNTER
Spoke to pt and gave results. PT said he is okay with restarting a blood thinner. Pt is going to call to scheduled the MRI.

## 2024-01-24 NOTE — TELEPHONE ENCOUNTER
CT head reviewed.  There is a spot in the brain that could be a venous blood clot.  Radiologist recommends follow-up study to confirm.  I have ordered MRI.    This could be an explanation for his headaches.  This could be a reason to restart a blood thinner but we need to make this decision carefully given his history of anemia

## 2024-01-25 ENCOUNTER — TELEPHONE (OUTPATIENT)
Age: 44
End: 2024-01-25

## 2024-01-25 NOTE — TELEPHONE ENCOUNTER
[facility] called, they stated the new orders for MRI that were put in needed to be updated to both say with or without contrast, once it is updated they will call patient to schedule appointment.

## 2024-01-26 NOTE — TELEPHONE ENCOUNTER
Please check with radiology department on this please.  I was in contact with radiologist about what follow-up is needed for the CT that might have showed a blood clot in his brain and these are the MRI/MRV with contrast is specifically what was recommended by the radiologist    Happy to update if I misunderstood

## 2024-02-11 ENCOUNTER — HOSPITAL ENCOUNTER (OUTPATIENT)
Facility: HOSPITAL | Age: 44
Discharge: HOME | End: 2024-02-14
Payer: COMMERCIAL

## 2024-02-11 DIAGNOSIS — G08 ACUTE CEREBRAL VENOUS SINUS THROMBOSIS: ICD-10-CM

## 2024-02-11 PROCEDURE — A9579 GAD-BASE MR CONTRAST NOS,1ML: HCPCS | Performed by: FAMILY MEDICINE

## 2024-02-11 PROCEDURE — 6360000004 HC RX CONTRAST MEDICATION: Performed by: FAMILY MEDICINE

## 2024-02-11 PROCEDURE — 70546 MR ANGIOGRAPH HEAD W/O&W/DYE: CPT

## 2024-02-11 RX ADMIN — GADOTERIDOL 14 ML: 279.3 INJECTION, SOLUTION INTRAVENOUS at 13:25

## 2024-02-13 ENCOUNTER — TELEPHONE (OUTPATIENT)
Age: 44
End: 2024-02-13

## 2024-02-13 NOTE — TELEPHONE ENCOUNTER
MRV reviewed.  \"No evidence of acute venous sinus thrombosis.  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    This appears to be the same blood clot that he had years ago that is turned into a \"chronic blood clot\".  This is usually not a problem.  This makes it unlikely that it is causing his current symptoms because there is unlikely to have been any recent changes.    How are his headaches doing?    Is still having significant headaches I would offer propranolol    While I do not think a blood thinner is necessary, if he is really worried about the blood clot it would not be unreasonable to try a blood thinner for a month or 2 to see if this had any effect on his headache

## 2024-02-13 NOTE — TELEPHONE ENCOUNTER
Wife is calling wanting to get result from scan states this was done Sunday she can be reached at [phone]

## 2024-02-14 ENCOUNTER — TELEPHONE (OUTPATIENT)
Age: 44
End: 2024-02-14

## 2024-02-15 ENCOUNTER — HOSPITAL ENCOUNTER (EMERGENCY)
Facility: HOSPITAL | Age: 44
Discharge: HOME | End: 2024-02-16
Attending: EMERGENCY MEDICINE
Payer: COMMERCIAL

## 2024-02-15 DIAGNOSIS — R11.2 NAUSEA AND VOMITING, UNSPECIFIED VOMITING TYPE: ICD-10-CM

## 2024-02-15 DIAGNOSIS — Z87.19 HISTORY OF PANCREATITIS: ICD-10-CM

## 2024-02-15 DIAGNOSIS — R10.13 ABDOMINAL PAIN, EPIGASTRIC: Primary | ICD-10-CM

## 2024-02-15 LAB
BASOPHILS # BLD: 0 K/UL (ref 0–0.1)
BASOPHILS NFR BLD: 0 % (ref 0–1)
DIFFERENTIAL METHOD BLD: ABNORMAL
EOSINOPHIL # BLD: 0.1 K/UL (ref 0–0.4)
EOSINOPHIL NFR BLD: 1 % (ref 0–7)
ERYTHROCYTE [DISTWIDTH] IN BLOOD BY AUTOMATED COUNT: 16.7 % (ref 11.5–14.5)
HCT VFR BLD AUTO: 40.2 % (ref 36.6–50.3)
HGB BLD-MCNC: 13.5 G/DL (ref 12.1–17)
IMM GRANULOCYTES # BLD AUTO: 0 K/UL (ref 0–0.04)
IMM GRANULOCYTES NFR BLD AUTO: 1 % (ref 0–0.5)
LYMPHOCYTES # BLD: 1.2 K/UL (ref 0.8–3.5)
LYMPHOCYTES NFR BLD: 18 % (ref 12–49)
MCH RBC QN AUTO: 30.6 PG (ref 26–34)
MCHC RBC AUTO-ENTMCNC: 33.6 G/DL (ref 30–36.5)
MCV RBC AUTO: 91.2 FL (ref 80–99)
MONOCYTES # BLD: 0.5 K/UL (ref 0–1)
MONOCYTES NFR BLD: 7 % (ref 5–13)
NEUTS SEG # BLD: 4.8 K/UL (ref 1.8–8)
NEUTS SEG NFR BLD: 73 % (ref 32–75)
NRBC # BLD: 0 K/UL (ref 0–0.01)
NRBC BLD-RTO: 0 PER 100 WBC
PLATELET # BLD AUTO: 244 K/UL (ref 150–400)
PMV BLD AUTO: 10.9 FL (ref 8.9–12.9)
RBC # BLD AUTO: 4.41 M/UL (ref 4.1–5.7)
WBC # BLD AUTO: 6.6 K/UL (ref 4.1–11.1)

## 2024-02-15 PROCEDURE — 99284 EMERGENCY DEPT VISIT MOD MDM: CPT

## 2024-02-15 PROCEDURE — 96374 THER/PROPH/DIAG INJ IV PUSH: CPT

## 2024-02-15 PROCEDURE — 36415 COLL VENOUS BLD VENIPUNCTURE: CPT

## 2024-02-15 PROCEDURE — 96375 TX/PRO/DX INJ NEW DRUG ADDON: CPT

## 2024-02-15 PROCEDURE — 85025 COMPLETE CBC W/AUTO DIFF WBC: CPT

## 2024-02-15 ASSESSMENT — PAIN - FUNCTIONAL ASSESSMENT: PAIN_FUNCTIONAL_ASSESSMENT: 0-10

## 2024-02-15 ASSESSMENT — PAIN SCALES - GENERAL: PAINLEVEL_OUTOF10: 10

## 2024-02-15 ASSESSMENT — LIFESTYLE VARIABLES
HOW OFTEN DO YOU HAVE A DRINK CONTAINING ALCOHOL: 2-4 TIMES A MONTH
HOW MANY STANDARD DRINKS CONTAINING ALCOHOL DO YOU HAVE ON A TYPICAL DAY: 3 OR 4

## 2024-02-15 NOTE — TELEPHONE ENCOUNTER
Spoke with Pt's wife while pt was on speaker phone.  Pt states his headaches have been much better and he verbalized understanding Dr Leahy's note regarding Scan.    Pt's wife wanted to let Dr Leahy know he went to [facility] at 4 a.m this morning for pancreatitis flare. He is home now.

## 2024-02-16 VITALS
DIASTOLIC BLOOD PRESSURE: 91 MMHG | OXYGEN SATURATION: 97 % | SYSTOLIC BLOOD PRESSURE: 162 MMHG | RESPIRATION RATE: 18 BRPM | BODY MASS INDEX: 21.56 KG/M2 | HEART RATE: 70 BPM | HEIGHT: 70 IN | WEIGHT: 150.57 LBS | TEMPERATURE: 97.5 F

## 2024-02-16 LAB
ALBUMIN SERPL-MCNC: 3.3 G/DL (ref 3.5–5)
ALBUMIN/GLOB SERPL: 1 (ref 1.1–2.2)
ALP SERPL-CCNC: 44 U/L (ref 45–117)
ALT SERPL-CCNC: 18 U/L (ref 12–78)
ANION GAP SERPL CALC-SCNC: 5 MMOL/L (ref 5–15)
AST SERPL-CCNC: 44 U/L (ref 15–37)
BILIRUB SERPL-MCNC: 0.4 MG/DL (ref 0.2–1)
BUN SERPL-MCNC: 3 MG/DL (ref 6–20)
BUN/CREAT SERPL: 4 (ref 12–20)
CALCIUM SERPL-MCNC: 8.2 MG/DL (ref 8.5–10.1)
CHLORIDE SERPL-SCNC: 107 MMOL/L (ref 97–108)
CO2 SERPL-SCNC: 26 MMOL/L (ref 21–32)
CREAT SERPL-MCNC: 0.73 MG/DL (ref 0.7–1.3)
GLOBULIN SER CALC-MCNC: 3.3 G/DL (ref 2–4)
GLUCOSE SERPL-MCNC: 105 MG/DL (ref 65–100)
LIPASE SERPL-CCNC: 50 U/L (ref 13–75)
POTASSIUM SERPL-SCNC: 4.8 MMOL/L (ref 3.5–5.1)
PROT SERPL-MCNC: 6.6 G/DL (ref 6.4–8.2)
SODIUM SERPL-SCNC: 138 MMOL/L (ref 136–145)

## 2024-02-16 PROCEDURE — 6360000002 HC RX W HCPCS: Performed by: EMERGENCY MEDICINE

## 2024-02-16 PROCEDURE — 96374 THER/PROPH/DIAG INJ IV PUSH: CPT

## 2024-02-16 PROCEDURE — 83690 ASSAY OF LIPASE: CPT

## 2024-02-16 PROCEDURE — 36415 COLL VENOUS BLD VENIPUNCTURE: CPT

## 2024-02-16 PROCEDURE — 80053 COMPREHEN METABOLIC PANEL: CPT

## 2024-02-16 PROCEDURE — 96375 TX/PRO/DX INJ NEW DRUG ADDON: CPT

## 2024-02-16 PROCEDURE — 2580000003 HC RX 258: Performed by: EMERGENCY MEDICINE

## 2024-02-16 RX ORDER — ONDANSETRON 2 MG/ML
4 INJECTION INTRAMUSCULAR; INTRAVENOUS ONCE
Status: COMPLETED | OUTPATIENT
Start: 2024-02-16 | End: 2024-02-16

## 2024-02-16 RX ORDER — 0.9 % SODIUM CHLORIDE 0.9 %
1000 INTRAVENOUS SOLUTION INTRAVENOUS ONCE
Status: COMPLETED | OUTPATIENT
Start: 2024-02-16 | End: 2024-02-16

## 2024-02-16 RX ORDER — MORPHINE SULFATE 4 MG/ML
4 INJECTION, SOLUTION INTRAMUSCULAR; INTRAVENOUS
Status: COMPLETED | OUTPATIENT
Start: 2024-02-16 | End: 2024-02-16

## 2024-02-16 RX ADMIN — ONDANSETRON 4 MG: 2 INJECTION INTRAMUSCULAR; INTRAVENOUS at 00:58

## 2024-02-16 RX ADMIN — MORPHINE SULFATE 4 MG: 4 INJECTION, SOLUTION INTRAMUSCULAR; INTRAVENOUS at 00:59

## 2024-02-16 RX ADMIN — SODIUM CHLORIDE 1000 ML: 9 INJECTION, SOLUTION INTRAVENOUS at 00:58

## 2024-02-16 NOTE — ED PROVIDER NOTES
[facility] EMERGENCY DEPT  EMERGENCY DEPARTMENT ENCOUNTER         Pt Name: Jhony Azar  MRN: [id number]  Birthdate 1980  Date of evaluation: 2/15/2024  Provider: Melody Mejia MD   PCP: Franklyn Leahy MD  Note Started: 3:09 AM 2/16/24     CHIEF COMPLAINT       Chief Complaint   Patient presents with    Pancreatitis     Patient arrives ambulatory to triage with complaint of pancreatitis flare up. Patient was seen at [facility] early this morning and was discharged home after pain medication. Patient reports that his pain is still ongoing and he has been vomiting for the past 3 days. Patient also reports not having bowel movement in the past 3 days as well.         HISTORY OF PRESENT ILLNESS: 1 or more elements      History From: {SAHPI:39469}  HPI Limitations: {HPI Limitations (Optional):33593}     Jhony Azar is a 43 y.o. male who presents ***  History of etoh induced pancreatitis  Now feels same  4 days  Pain, n/v  Seen this am, discharged on norco, then pain cmame abck    Please see more comprehensive history below under MDM  Nursing Notes were all reviewed in real time as they are made available. Any disagreements addressed in the HPI/MDM.     REVIEW OF SYSTEMS      Review of Systems     Positives and Pertinent negatives as per HPI and MDM.    PAST HISTORY     Past Medical History:  Past Medical History:   Diagnosis Date    Anemia     4/3/19    Bleeding hemorrhoid     Elevated cholesterol     Hypertension     Pancreatitis        Past Surgical History:  Past Surgical History:   Procedure Laterality Date    COLONOSCOPY N/A 4/4/2019    COLONOSCOPY performed by Dwayne Castle MD at [facility] ENDOSCOPY    COLONOSCOPY N/A 6/13/2023    COLONOSCOPY DIAGNOSTIC performed by Medhat Nicole MD at [facility] ENDOSCOPY    GI      HEMORRHOID SURGERY      2013    UPPER GASTROINTESTINAL ENDOSCOPY N/A 6/13/2023    EGD ESOPHAGOGASTRODUODENOSCOPY with biopsy performed by Medhat Nicole MD at [facility] ENDOSCOPY       Family History:  Family  (Recheck)    Saline lock IV         counseling    Amount and/or Complexity of Data Reviewed  HIGH complexity decision making performed   Presentation: ACUTE and SEVERE, systemic symptoms, impact on quality of life, morbidity and mortality.  Clinical lab tests: ordered as appropriate, reviewed and interpreted by me   Tests in the radiology section of CPT®: ordered as appropriate & images reviewed and interpreted by me  Tests in the medicine section of CPT®: ordered as appropriate, reviewed and interpreted by me  Reviewed and summarized above results: yes  Review and summarize past medical records: yes  Independent visualization of images, ECGs, or specimens: yes      Risks  OTC drugs.  Prescription drug management.  Parenteral controlled substances.  Drug therapy requiring intensive monitoring for toxicity.  Decision regarding hospitalization.     FINAL IMPRESSION     1. Abdominal pain, epigastric    2. Nausea and vomiting, unspecified vomiting type    3. History of pancreatitis          DISPOSITION/PLAN         DISPOSITION: DISCHARGE  The patient's results have been reviewed with patient and available family and/or caregiver. They verbally convey their understanding and agreement of the patient's signs, symptoms, diagnosis, treatment and prognosis and additionally agree to follow up as recommended in the discharge instructions or to return to the Emergency Department should the patient's condition change prior to their follow-up appointment.   The patient and available family and/or caregiver verbally agree with the care plan and all of their questions have been answered. The discharge instructions have also been provided to the them with educational information regarding the patient's diagnosis as well a list of reasons why the patient would want to return to the ER prior to their follow-up appointment should any concerns arise, the patient's condition change or symptoms worsen.    Melody Mejia MD, Msc    PLAN:    groups, acupuncture, medication assistance. Pt has conveyed their understanding of the risks involved should they continue to use tobacco products.      Patient was given the following medications:  Medications   sodium chloride 0.9 % bolus 1,000 mL (1,000 mLs IntraVENous New Bag 2/16/24 0058)   morphine sulfate (PF) injection 4 mg (4 mg IntraVENous Given 2/16/24 0059)   ondansetron (ZOFRAN) injection 4 mg (4 mg IntraVENous Given 2/16/24 0058)           CONSULTS: (Who and What was discussed)  None      ED Orders Placed:  Orders Placed This Encounter   Procedures    CBC with Auto Differential    Urinalysis with Reflex to Culture    Lipase    Comprehensive Metabolic Panel    Vital Signs (Recheck)    Saline lock IV         Amount and/or Complexity of Data Reviewed  HIGH complexity decision making performed   Presentation: ACUTE and SEVERE, systemic symptoms, impact on quality of life, morbidity and mortality.  Clinical lab tests: ordered as appropriate, reviewed and interpreted by me   Tests in the medicine section of CPT®: ordered as appropriate, reviewed and interpreted by me  Reviewed and summarized above results: yes  Review and summarize past medical records: yes  Independent visualization of images, ECGs, or specimens: yes      Risks  OTC drugs.  Prescription drug management.  Parenteral controlled substances.  Drug therapy requiring intensive monitoring for toxicity.  Decision regarding hospitalization.     FINAL IMPRESSION     1. Abdominal pain, epigastric    2. Nausea and vomiting, unspecified vomiting type    3. History of pancreatitis          DISPOSITION/PLAN         DISPOSITION: DISCHARGE  The patient's results have been reviewed with patient and available family and/or caregiver. They verbally convey their understanding and agreement of the patient's signs, symptoms, diagnosis, treatment and prognosis and additionally agree to follow up as recommended in the discharge instructions or to return to the

## 2024-02-16 NOTE — ED NOTES
Patient discharged from the ED by Roberto NUNEZ/Tiny DOMÍNGUEZ. Diagnosis, medications, precautions and follow-ups were reviewed with the patient/family. Questions were asked and answered prior to departure. Patient departed the ED via ambulation and was accompanied by self.

## 2024-02-16 NOTE — DISCHARGE INSTRUCTIONS
It was a pleasure taking care of you in our Emergency Department today.  We know that when you come to [facility], you are entrusting us with your health, comfort, and safety.  Our physicians and nurses honor that trust, and truly appreciate the opportunity to care for you and your loved ones.    We also value your feedback.  If you receive a survey about your Emergency Department experience today, please fill it out.  We care about our patients' feedback, and we listen to what you have to say.  Thank you!       --- Dr. Melody Mejia MD

## 2024-03-01 ASSESSMENT — ENCOUNTER SYMPTOMS
SHORTNESS OF BREATH: 0
VOMITING: 1
BACK PAIN: 0
COLOR CHANGE: 0
DIARRHEA: 0
CONSTIPATION: 0
BLOOD IN STOOL: 0
ABDOMINAL PAIN: 1
COUGH: 0
ABDOMINAL DISTENTION: 0
NAUSEA: 1

## 2024-07-01 RX ORDER — PANTOPRAZOLE SODIUM 40 MG/1
TABLET, DELAYED RELEASE ORAL
Qty: 90 TABLET | Refills: 3 | Status: SHIPPED | OUTPATIENT
Start: 2024-07-01

## 2024-09-20 RX ORDER — FENOFIBRATE 145 MG/1
145 TABLET, COATED ORAL DAILY
Qty: 90 TABLET | Refills: 3 | Status: SHIPPED | OUTPATIENT
Start: 2024-09-20

## 2024-10-23 ENCOUNTER — OFFICE VISIT (OUTPATIENT)
Age: 44
End: 2024-10-23
Payer: COMMERCIAL

## 2024-10-23 ENCOUNTER — TELEPHONE (OUTPATIENT)
Age: 44
End: 2024-10-23

## 2024-10-23 VITALS
BODY MASS INDEX: 22.65 KG/M2 | RESPIRATION RATE: 18 BRPM | HEIGHT: 70 IN | HEART RATE: 80 BPM | SYSTOLIC BLOOD PRESSURE: 123 MMHG | DIASTOLIC BLOOD PRESSURE: 82 MMHG | OXYGEN SATURATION: 99 % | WEIGHT: 158.2 LBS | TEMPERATURE: 98.6 F

## 2024-10-23 DIAGNOSIS — E61.1 IRON DEFICIENCY: ICD-10-CM

## 2024-10-23 DIAGNOSIS — Z00.00 ROUTINE GENERAL MEDICAL EXAMINATION AT A HEALTH CARE FACILITY: Primary | ICD-10-CM

## 2024-10-23 DIAGNOSIS — L20.9 ATOPIC DERMATITIS, UNSPECIFIED TYPE: ICD-10-CM

## 2024-10-23 DIAGNOSIS — E78.5 HYPERLIPIDEMIA, UNSPECIFIED HYPERLIPIDEMIA TYPE: ICD-10-CM

## 2024-10-23 DIAGNOSIS — L29.9 PRURITUS: ICD-10-CM

## 2024-10-23 DIAGNOSIS — Z86.2 HISTORY OF ANEMIA: ICD-10-CM

## 2024-10-23 DIAGNOSIS — E55.9 VITAMIN D DEFICIENCY: ICD-10-CM

## 2024-10-23 DIAGNOSIS — E53.8 B12 DEFICIENCY: ICD-10-CM

## 2024-10-23 PROCEDURE — 3079F DIAST BP 80-89 MM HG: CPT | Performed by: FAMILY MEDICINE

## 2024-10-23 PROCEDURE — 3074F SYST BP LT 130 MM HG: CPT | Performed by: FAMILY MEDICINE

## 2024-10-23 PROCEDURE — 99396 PREV VISIT EST AGE 40-64: CPT | Performed by: FAMILY MEDICINE

## 2024-10-23 RX ORDER — SILDENAFIL 50 MG/1
50 TABLET, FILM COATED ORAL DAILY PRN
Qty: 30 TABLET | Refills: 11 | Status: SHIPPED | OUTPATIENT
Start: 2024-10-23

## 2024-10-23 RX ORDER — HYDROXYZINE HYDROCHLORIDE 50 MG/1
50 TABLET, FILM COATED ORAL EVERY 8 HOURS PRN
Qty: 30 TABLET | Refills: 0 | Status: SHIPPED | OUTPATIENT
Start: 2024-10-23 | End: 2024-11-02

## 2024-10-23 RX ORDER — HYDROXYZINE HYDROCHLORIDE 50 MG/1
50 TABLET, FILM COATED ORAL EVERY 8 HOURS PRN
Qty: 30 TABLET | Refills: 0 | Status: SHIPPED | OUTPATIENT
Start: 2024-10-23 | End: 2024-10-23 | Stop reason: SDUPTHER

## 2024-10-23 RX ORDER — SILDENAFIL 50 MG/1
50 TABLET, FILM COATED ORAL DAILY PRN
Qty: 30 TABLET | Refills: 11 | Status: SHIPPED | OUTPATIENT
Start: 2024-10-23 | End: 2024-10-23 | Stop reason: SDUPTHER

## 2024-10-23 SDOH — ECONOMIC STABILITY: FOOD INSECURITY: WITHIN THE PAST 12 MONTHS, THE FOOD YOU BOUGHT JUST DIDN'T LAST AND YOU DIDN'T HAVE MONEY TO GET MORE.: NEVER TRUE

## 2024-10-23 SDOH — ECONOMIC STABILITY: FOOD INSECURITY: WITHIN THE PAST 12 MONTHS, YOU WORRIED THAT YOUR FOOD WOULD RUN OUT BEFORE YOU GOT MONEY TO BUY MORE.: NEVER TRUE

## 2024-10-23 SDOH — ECONOMIC STABILITY: INCOME INSECURITY: HOW HARD IS IT FOR YOU TO PAY FOR THE VERY BASICS LIKE FOOD, HOUSING, MEDICAL CARE, AND HEATING?: NOT HARD AT ALL

## 2024-10-23 NOTE — PATIENT INSTRUCTIONS
[facility] [phone]    [facility] [phone]    Counts include 234 beds at the [facility] [phone]    [facility] [phone]

## 2024-10-23 NOTE — PROGRESS NOTES
Chief Complaint   Patient presents with    Annual Exam         Health Maintenance Due   Topic Date Due    Varicella vaccine (1 of 2 - 13+ 2-dose series) Never done    Hepatitis C screen  Never done    Flu vaccine (1) Never done    COVID-19 Vaccine (2 - 2023-24 season) 09/01/2024         \"Have you been to the ER, urgent care clinic since your last visit?  Hospitalized since your last visit?\"    NO    “Have you seen or consulted any other health care providers outside of [facility] since your last visit?”    NO

## 2024-10-23 NOTE — TELEPHONE ENCOUNTER
Spouse just called to let Dr Leahy know pt's med needs to go to [facility] in [place]. States  knows med to be sent in asap

## 2024-10-23 NOTE — PROGRESS NOTES
JODY  Jhony Azar is a 44 y.o. male who presents to follow-up on chronic medical issues.    He would like to talk about his itching.  This has been going on for 10 years.  He feels itchy all day and it is especially bad at night.  It affects his ability to sleep.  He reports that he has tried switching everything to sensitive skin variety.  Uses fragrance free Dove in the shower, fragrance free products in the washer and dryer.    Is occasionally using coconut oil aloe and massage oil products  Puts a sensitive skin lotion on when he gets out of the shower    Notices that he does not typically have a rash but if he scratches a particular area long enough then he will develop a rash.    He has tried Zyrtec for 3 months without much relief.  He has acquired a strategy where he will \"crush 1 Benadryl and take another 1 hole\" at the beginning of the day and that seems to help.  This does not make him sleepy and he cannot use Benadryl to help night for sleep.    Recall that he had a tense headache, some year ago.  He tells me that the gabapentin was helpful at remedying this and he also made some lifestyle changes.  Cut back on the alcohol, cut back on fried food and cut down on stress and has not had a headache since      PMHx:  Past Medical History:   Diagnosis Date    Anemia     4/3/19    Bleeding hemorrhoid     Elevated cholesterol     Hypertension     Pancreatitis        Meds:   Current Outpatient Medications   Medication Sig Dispense Refill    sildenafil (VIAGRA) 50 MG tablet Take 1 tablet by mouth daily as needed for Erectile Dysfunction 30 tablet 11    hydrOXYzine HCl (ATARAX) 50 MG tablet Take 1 tablet by mouth every 8 hours as needed for Itching 30 tablet 0    fenofibrate (TRICOR) 145 MG tablet Take 1 tablet by mouth once daily 90 tablet 3    pantoprazole (PROTONIX) 40 MG tablet Take 1 tablet by mouth once daily 90 tablet 3    ferrous sulfate (IRON 325) 325 (65 Fe) MG tablet Take 1 tablet by mouth daily (with

## 2024-10-24 ENCOUNTER — TELEPHONE (OUTPATIENT)
Age: 44
End: 2024-10-24

## 2024-10-24 DIAGNOSIS — D50.9 IRON DEFICIENCY ANEMIA, UNSPECIFIED IRON DEFICIENCY ANEMIA TYPE: Primary | ICD-10-CM

## 2024-10-24 LAB
25(OH)D3+25(OH)D2 SERPL-MCNC: 17.7 NG/ML (ref 30–100)
ALBUMIN SERPL-MCNC: 5 G/DL (ref 4.1–5.1)
ALP SERPL-CCNC: 49 IU/L (ref 44–121)
ALT SERPL-CCNC: 11 IU/L (ref 0–44)
AST SERPL-CCNC: 39 IU/L (ref 0–40)
BASOPHILS # BLD AUTO: 0 X10E3/UL (ref 0–0.2)
BASOPHILS NFR BLD AUTO: 1 %
BILIRUB SERPL-MCNC: 0.2 MG/DL (ref 0–1.2)
BUN SERPL-MCNC: 5 MG/DL (ref 6–24)
BUN/CREAT SERPL: 6 (ref 9–20)
CALCIUM SERPL-MCNC: 9.4 MG/DL (ref 8.7–10.2)
CHLORIDE SERPL-SCNC: 100 MMOL/L (ref 96–106)
CHOLEST SERPL-MCNC: 193 MG/DL (ref 100–199)
CO2 SERPL-SCNC: 20 MMOL/L (ref 20–29)
CREAT SERPL-MCNC: 0.82 MG/DL (ref 0.76–1.27)
EGFRCR SERPLBLD CKD-EPI 2021: 112 ML/MIN/1.73
EOSINOPHIL # BLD AUTO: 0.2 X10E3/UL (ref 0–0.4)
EOSINOPHIL NFR BLD AUTO: 4 %
ERYTHROCYTE [DISTWIDTH] IN BLOOD BY AUTOMATED COUNT: 28.2 % (ref 11.6–15.4)
FERRITIN SERPL-MCNC: 7 NG/ML (ref 30–400)
FOLATE SERPL-MCNC: 6.7 NG/ML
GLOBULIN SER CALC-MCNC: 2.7 G/DL (ref 1.5–4.5)
GLUCOSE SERPL-MCNC: 106 MG/DL (ref 70–99)
HCT VFR BLD AUTO: 28.5 % (ref 37.5–51)
HDLC SERPL-MCNC: 77 MG/DL
HGB BLD-MCNC: 7.6 G/DL (ref 13–17.7)
IMM GRANULOCYTES # BLD AUTO: 0 X10E3/UL (ref 0–0.1)
IMM GRANULOCYTES NFR BLD AUTO: 1 %
IMP & REVIEW OF LAB RESULTS: NORMAL
IRON SATN MFR SERPL: 1 % (ref 15–55)
IRON SERPL-MCNC: 7 UG/DL (ref 38–169)
LDLC SERPL CALC-MCNC: 106 MG/DL (ref 0–99)
LYMPHOCYTES # BLD AUTO: 1.2 X10E3/UL (ref 0.7–3.1)
LYMPHOCYTES NFR BLD AUTO: 28 %
MCH RBC QN AUTO: 19 PG (ref 26.6–33)
MCHC RBC AUTO-ENTMCNC: 26.7 G/DL (ref 31.5–35.7)
MCV RBC AUTO: 71 FL (ref 79–97)
MONOCYTES # BLD AUTO: 0.5 X10E3/UL (ref 0.1–0.9)
MONOCYTES NFR BLD AUTO: 12 %
MORPHOLOGY BLD-IMP: ABNORMAL
NEUTROPHILS # BLD AUTO: 2.4 X10E3/UL (ref 1.4–7)
NEUTROPHILS NFR BLD AUTO: 54 %
NRBC BLD AUTO-RTO: 1 % (ref 0–0)
PLATELET # BLD AUTO: 347 X10E3/UL (ref 150–450)
POTASSIUM SERPL-SCNC: 4.2 MMOL/L (ref 3.5–5.2)
PROT SERPL-MCNC: 7.7 G/DL (ref 6–8.5)
RBC # BLD AUTO: 4 X10E6/UL (ref 4.14–5.8)
SODIUM SERPL-SCNC: 140 MMOL/L (ref 134–144)
T4 FREE SERPL-MCNC: 1.17 NG/DL (ref 0.82–1.77)
TIBC SERPL-MCNC: 569 UG/DL (ref 250–450)
TRIGL SERPL-MCNC: 55 MG/DL (ref 0–149)
TSH SERPL DL<=0.005 MIU/L-ACNC: 0.76 UIU/ML (ref 0.45–4.5)
UIBC SERPL-MCNC: 562 UG/DL (ref 111–343)
VIT B12 SERPL-MCNC: 352 PG/ML (ref 232–1245)
VLDLC SERPL CALC-MCNC: 10 MG/DL (ref 5–40)
WBC # BLD AUTO: 4.3 X10E3/UL (ref 3.4–10.8)

## 2024-10-24 NOTE — TELEPHONE ENCOUNTER
Labs reviewed.  He is very anemic.  Not to the point where he needs to go to the emergency room but right on the borderline.  His iron levels are extremely low.    Recommend iron infusion to help boost iron stores and build new blood.  If you do not boost iron you may develop a worsening anemia, gets symptomatic and then need to end up in the hospital    Strongly recommend seeing a hematologist (blood doctor) to help us determine why you are losing blood.  You saw a hematologist at your last hospital stay who wanted to see you outpatient.  I do not think you ever went to see them though    Dr. Nuñez and colleagues  [phone]

## 2024-10-24 NOTE — TELEPHONE ENCOUNTER
verified with pt and pt wife. Informed both of message from provider regarding lab results. Pt verified understanding, agreed to iron infusion at [facility] infusion center. Also gave wife phone number to Dr. Nuñez's office. Wife will schedule appt for patient. Informed provider will put order for iron infusion in to [facility] and the infusion center will call pt to get scheduled. Wife and pt verified understanding and had no further questions.

## 2024-10-31 ENCOUNTER — OFFICE VISIT (OUTPATIENT)
Age: 44
End: 2024-10-31

## 2024-10-31 VITALS
DIASTOLIC BLOOD PRESSURE: 75 MMHG | BODY MASS INDEX: 21.9 KG/M2 | OXYGEN SATURATION: 98 % | SYSTOLIC BLOOD PRESSURE: 121 MMHG | RESPIRATION RATE: 16 BRPM | TEMPERATURE: 98.2 F | HEART RATE: 86 BPM | HEIGHT: 70 IN | WEIGHT: 153 LBS

## 2024-10-31 DIAGNOSIS — D50.9 IRON DEFICIENCY ANEMIA, UNSPECIFIED IRON DEFICIENCY ANEMIA TYPE: ICD-10-CM

## 2024-10-31 DIAGNOSIS — J06.9 VIRAL URI: Primary | ICD-10-CM

## 2024-10-31 DIAGNOSIS — R52 BODY ACHES: ICD-10-CM

## 2024-10-31 LAB
INFLUENZA A ANTIGEN, POC: NEGATIVE
INFLUENZA B ANTIGEN, POC: NEGATIVE
VALID INTERNAL CONTROL, POC: YES

## 2024-10-31 NOTE — PROGRESS NOTES
Chief Complaint   Patient presents with    Dizziness    Fatigue    Generalized Body Aches     Symptoms started yesterday     Health Maintenance Due   Topic Date Due    Pneumococcal 0-64 years Vaccine (1 of 2 - PCV) Never done    Varicella vaccine (1 of 2 - 13+ 2-dose series) Never done    Hepatitis C screen  Never done    Hepatitis B vaccine (1 of 3 - 19+ 3-dose series) Never done    Flu vaccine (1) Never done    COVID-19 Vaccine (2 - 2023-24 season) 09/01/2024         \"Have you been to the ER, urgent care clinic since your last visit?  Hospitalized since your last visit?\"    NO    “Have you seen or consulted any other health care providers outside of [facility] since your last visit?”    NO Scheduled to see hematologist November 8th

## 2024-10-31 NOTE — PROGRESS NOTES
Subjective:   Jhony Azar is a 44 y.o. male who complains of rhinitis, post nasal drip, night sweats, weakness, dizziness, nausea and myalgias for 1 days, gradually worsening since that time.  He denies a history of fevers (he did not check his temperature), shortness of breath, and wheezing. He has not yet tried OTC cold remedies, but takes Afrin QHS every night for the last 3 years.  No evaluation to date.   No h/o asthma  He works as a   Has not done home covid test    He saw PCP Dr. Leahy last week for his physical and has been prescribed iron infusions but states he has not heard back yet from [facility] regarding scheduling      Past Medical History:   Diagnosis Date    Anemia     4/3/19    Bleeding hemorrhoid     Elevated cholesterol     Hypertension     Pancreatitis      Social History     Tobacco Use    Smoking status: Every Day     Current packs/day: 0.25     Average packs/day: 0.3 packs/day for 8.0 years (2.0 ttl pk-yrs)     Types: Cigarettes    Smokeless tobacco: Never    Tobacco comments:     4 a day   Vaping Use    Vaping status: Never Used   Substance Use Topics    Alcohol use: Not Currently    Drug use: Yes     Types: Marijuana (Weed)       Allergies   Allergen Reactions    Peanut-Containing Drug Products Hives      Current Outpatient Medications   Medication Sig Dispense Refill    hydrOXYzine HCl (ATARAX) 50 MG tablet Take 1 tablet by mouth every 8 hours as needed for Itching 30 tablet 0    sildenafil (VIAGRA) 50 MG tablet Take 1 tablet by mouth daily as needed for Erectile Dysfunction 30 tablet 11    fenofibrate (TRICOR) 145 MG tablet Take 1 tablet by mouth once daily 90 tablet 3    pantoprazole (PROTONIX) 40 MG tablet Take 1 tablet by mouth once daily 90 tablet 3    ferrous sulfate (IRON 325) 325 (65 Fe) MG tablet Take 1 tablet by mouth daily (with breakfast) 60 tablet 0    gabapentin (NEURONTIN) 300 MG capsule Take 1 capsule by mouth 3 times daily for 30 days. Intended supply: 30 days Max Daily

## 2024-11-08 ENCOUNTER — OFFICE VISIT (OUTPATIENT)
Age: 44
End: 2024-11-08
Payer: COMMERCIAL

## 2024-11-08 VITALS
BODY MASS INDEX: 22.07 KG/M2 | OXYGEN SATURATION: 98 % | DIASTOLIC BLOOD PRESSURE: 83 MMHG | SYSTOLIC BLOOD PRESSURE: 128 MMHG | WEIGHT: 154.2 LBS | HEART RATE: 78 BPM | RESPIRATION RATE: 16 BRPM | TEMPERATURE: 98.6 F | HEIGHT: 70 IN

## 2024-11-08 DIAGNOSIS — K21.9 CHRONIC GERD: ICD-10-CM

## 2024-11-08 DIAGNOSIS — D50.8 OTHER IRON DEFICIENCY ANEMIA: ICD-10-CM

## 2024-11-08 DIAGNOSIS — F17.200 SMOKER: ICD-10-CM

## 2024-11-08 DIAGNOSIS — D50.8 OTHER IRON DEFICIENCY ANEMIA: Primary | ICD-10-CM

## 2024-11-08 DIAGNOSIS — Z79.899 LONG-TERM CURRENT USE OF PROTON PUMP INHIBITOR THERAPY: ICD-10-CM

## 2024-11-08 DIAGNOSIS — Z78.9 ALCOHOL USE: ICD-10-CM

## 2024-11-08 PROCEDURE — 3074F SYST BP LT 130 MM HG: CPT | Performed by: NURSE PRACTITIONER

## 2024-11-08 PROCEDURE — 99204 OFFICE O/P NEW MOD 45 MIN: CPT | Performed by: NURSE PRACTITIONER

## 2024-11-08 PROCEDURE — 3079F DIAST BP 80-89 MM HG: CPT | Performed by: NURSE PRACTITIONER

## 2024-11-08 RX ORDER — HEPARIN SODIUM (PORCINE) LOCK FLUSH IV SOLN 100 UNIT/ML 100 UNIT/ML
500 SOLUTION INTRAVENOUS PRN
Status: CANCELLED | OUTPATIENT
Start: 2024-11-15

## 2024-11-08 RX ORDER — ALBUTEROL SULFATE 90 UG/1
4 INHALANT RESPIRATORY (INHALATION) PRN
Status: CANCELLED | OUTPATIENT
Start: 2024-11-15

## 2024-11-08 RX ORDER — SODIUM CHLORIDE 0.9 % (FLUSH) 0.9 %
5-40 SYRINGE (ML) INJECTION PRN
Status: CANCELLED | OUTPATIENT
Start: 2024-11-15

## 2024-11-08 RX ORDER — DIPHENHYDRAMINE HYDROCHLORIDE 50 MG/ML
50 INJECTION INTRAMUSCULAR; INTRAVENOUS
Status: CANCELLED | OUTPATIENT
Start: 2024-11-15

## 2024-11-08 RX ORDER — ACETAMINOPHEN 325 MG/1
650 TABLET ORAL
Status: CANCELLED | OUTPATIENT
Start: 2024-11-15

## 2024-11-08 RX ORDER — EPINEPHRINE 1 MG/ML
0.3 INJECTION, SOLUTION, CONCENTRATE INTRAVENOUS PRN
Status: CANCELLED | OUTPATIENT
Start: 2024-11-15

## 2024-11-08 RX ORDER — FAMOTIDINE 10 MG/ML
20 INJECTION, SOLUTION INTRAVENOUS
Status: CANCELLED | OUTPATIENT
Start: 2024-11-15

## 2024-11-08 RX ORDER — SODIUM CHLORIDE 9 MG/ML
5-250 INJECTION, SOLUTION INTRAVENOUS PRN
Status: CANCELLED | OUTPATIENT
Start: 2024-11-15

## 2024-11-08 RX ORDER — ONDANSETRON 2 MG/ML
8 INJECTION INTRAMUSCULAR; INTRAVENOUS
Status: CANCELLED | OUTPATIENT
Start: 2024-11-15

## 2024-11-08 RX ORDER — SODIUM CHLORIDE 9 MG/ML
INJECTION, SOLUTION INTRAVENOUS CONTINUOUS
Status: CANCELLED | OUTPATIENT
Start: 2024-11-15

## 2024-11-08 NOTE — PROGRESS NOTES
45 y/o AAM here for initial appt with NP for BURT.  PCP referred pt to [facility] for iron infusions on 10/24/24   He said he was getting iron infusions at [facility]. Last saw them in 2023.  He has had colonoscopies and endoscopies.  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 He was seen over at [facility].  He last had blood transfusion in 6/2023, which was here at [facility]  Wife reports once a year it seems he needs blood and or iron infusions.  Reports some blood in his stools which is bright red at times, and he says it all depends if he strains or not.  Energy level is poor.  He has been on and off iron pills for years but it makes him constipated. He has currently restarted ot anpit 2 roge guillen/      Avelino RN called [facility] and they said his ferriheme is pending authorization, they received it in their system 10/29/24.           
social history, family history, medications, and allergies are located in the electronic medical record.    Physical Exam:     Vitals:    11/08/24 1311   BP: 128/83   Pulse: 78   Resp: 16   Temp: 98.6 °F (37 °C)   SpO2: 98%       General: no distress, fatigued, laying down on the exam table   Eyes: anicteric sclerae  Neck: supple, FROM  Lymphatic: no cervical adenopathy  Respiratory: normal respiratory effort, CTAB  CV: HRRR, no peripheral edema  GI: soft, nontender, nondistended, no masses  Skin: no rashes; no ecchymoses; no petechiae    Results:     Lab Results   Component Value Date    WBC 4.3 10/23/2024    HGB 7.6 (L) 10/23/2024    HCT 28.5 (L) 10/23/2024     10/23/2024    MCV 71 (L) 10/23/2024    NEUTROABS 2.4 10/23/2024     Lab Results   Component Value Date     10/23/2024    K 4.2 10/23/2024     10/23/2024    CO2 20 10/23/2024    GLUCOSE 106 (H) 10/23/2024    BUN 5 (L) 10/23/2024    CREATININE 0.82 10/23/2024    LABGLOM 112 10/23/2024    CALCIUM 9.4 10/23/2024    MG 1.8 06/09/2023    PHOS 3.1 07/10/2019     Lab Results   Component Value Date    BILITOT 0.2 10/23/2024    ALT 11 10/23/2024    AST 39 10/23/2024    ALKPHOS 49 10/23/2024    PROTEIN 7.7 10/23/2024    ALBUMIN 5.0 10/23/2024    GLOB 3.3 02/16/2024    LABGLOB 2.7 10/23/2024     Lab Results   Component Value Date    RETICAUTO 0.4 (L) 06/08/2023    RETICCTPCT 1.4 09/02/2021    IRON 7 (LL) 10/23/2024    TIBC 569 (HH) 10/23/2024    IRONPERSAT 1 (LL) 10/23/2024    FERRITIN 7 (L) 10/23/2024    JUSPKMQR73 352 10/23/2024    FOLATE 6.7 10/23/2024    COPPER 175 (H) 06/08/2023     06/08/2023     06/08/2023    HAPTOGLOBIN 63 06/08/2023    ROG0EGCX Non Reactive 10/23/2023    SPE6L Not Observed 06/09/2023     Lab Results   Component Value Date    INR 1.1 06/08/2023    PROTIME 11.1 06/08/2023    APTT 21.3 (L) 06/09/2023    FIBRINOGEN 313 06/09/2023    LIPASE 50 02/16/2024    TSH 0.758 10/23/2024    POCOCCBLDFEC Negative 06/11/2023

## 2024-11-09 LAB
BASOPHILS # BLD AUTO: 0 X10E3/UL (ref 0–0.2)
BASOPHILS NFR BLD AUTO: 1 %
EOSINOPHIL # BLD AUTO: 0 X10E3/UL (ref 0–0.4)
EOSINOPHIL NFR BLD AUTO: 1 %
ERYTHROCYTE [DISTWIDTH] IN BLOOD BY AUTOMATED COUNT: 28.4 % (ref 11.6–15.4)
FERRITIN SERPL-MCNC: 21 NG/ML (ref 30–400)
HCT VFR BLD AUTO: 27.3 % (ref 37.5–51)
HGB BLD-MCNC: 7.3 G/DL (ref 13–17.7)
IMM GRANULOCYTES # BLD AUTO: 0 X10E3/UL (ref 0–0.1)
IMM GRANULOCYTES NFR BLD AUTO: 1 %
IRON SATN MFR SERPL: 41 % (ref 15–55)
IRON SERPL-MCNC: 202 UG/DL (ref 38–169)
LYMPHOCYTES # BLD AUTO: 1.3 X10E3/UL (ref 0.7–3.1)
LYMPHOCYTES NFR BLD AUTO: 43 %
MCH RBC QN AUTO: 19 PG (ref 26.6–33)
MCHC RBC AUTO-ENTMCNC: 26.7 G/DL (ref 31.5–35.7)
MCV RBC AUTO: 71 FL (ref 79–97)
MONOCYTES # BLD AUTO: 0.4 X10E3/UL (ref 0.1–0.9)
MONOCYTES NFR BLD AUTO: 12 %
MORPHOLOGY BLD-IMP: ABNORMAL
NEUTROPHILS # BLD AUTO: 1.3 X10E3/UL (ref 1.4–7)
NEUTROPHILS NFR BLD AUTO: 42 %
PERIPHERAL SMEAR, MD REVIEW: NORMAL
PLATELET # BLD AUTO: 85 X10E3/UL (ref 150–450)
RBC # BLD AUTO: 3.85 X10E6/UL (ref 4.14–5.8)
TIBC SERPL-MCNC: 493 UG/DL (ref 250–450)
UIBC SERPL-MCNC: 291 UG/DL (ref 111–343)
WBC # BLD AUTO: 3 X10E3/UL (ref 3.4–10.8)

## 2024-11-15 ENCOUNTER — HOSPITAL ENCOUNTER (OUTPATIENT)
Facility: HOSPITAL | Age: 44
Setting detail: INFUSION SERIES
Discharge: HOME | End: 2024-11-15
Payer: COMMERCIAL

## 2024-11-15 VITALS
SYSTOLIC BLOOD PRESSURE: 116 MMHG | OXYGEN SATURATION: 96 % | WEIGHT: 153.8 LBS | DIASTOLIC BLOOD PRESSURE: 74 MMHG | HEIGHT: 70 IN | BODY MASS INDEX: 22.02 KG/M2 | HEART RATE: 75 BPM | TEMPERATURE: 98.9 F | RESPIRATION RATE: 16 BRPM

## 2024-11-15 DIAGNOSIS — D64.9 ACUTE ANEMIA: Primary | ICD-10-CM

## 2024-11-15 DIAGNOSIS — D64.9 ACUTE ON CHRONIC ANEMIA: ICD-10-CM

## 2024-11-15 LAB
BASOPHILS # BLD: 0.1 K/UL (ref 0–0.1)
BASOPHILS NFR BLD: 2 % (ref 0–1)
DIFFERENTIAL METHOD BLD: ABNORMAL
EOSINOPHIL # BLD: 0.1 K/UL (ref 0–0.4)
EOSINOPHIL NFR BLD: 2 % (ref 0–7)
ERYTHROCYTE [DISTWIDTH] IN BLOOD BY AUTOMATED COUNT: 34.5 % (ref 11.5–14.5)
HCT VFR BLD AUTO: 29.4 % (ref 36.6–50.3)
HGB BLD-MCNC: 8.4 G/DL (ref 12.1–17)
IMM GRANULOCYTES # BLD AUTO: 0 K/UL (ref 0–0.04)
IMM GRANULOCYTES NFR BLD AUTO: 1 % (ref 0–0.5)
LYMPHOCYTES # BLD: 0.9 K/UL (ref 0.8–3.5)
LYMPHOCYTES NFR BLD: 28 % (ref 12–49)
MCH RBC QN AUTO: 19.3 PG (ref 26–34)
MCHC RBC AUTO-ENTMCNC: 28.6 G/DL (ref 30–36.5)
MCV RBC AUTO: 67.6 FL (ref 80–99)
MONOCYTES # BLD: 0.4 K/UL (ref 0–1)
MONOCYTES NFR BLD: 12 % (ref 5–13)
NEUTS SEG # BLD: 1.8 K/UL (ref 1.8–8)
NEUTS SEG NFR BLD: 55 % (ref 32–75)
NRBC # BLD: 0 K/UL (ref 0–0.01)
NRBC BLD-RTO: 0 PER 100 WBC
PLATELET # BLD AUTO: 227 K/UL (ref 150–400)
RBC # BLD AUTO: 4.35 M/UL (ref 4.1–5.7)
RBC MORPH BLD: ABNORMAL
WBC # BLD AUTO: 3.3 K/UL (ref 4.1–11.1)

## 2024-11-15 PROCEDURE — 96374 THER/PROPH/DIAG INJ IV PUSH: CPT

## 2024-11-15 PROCEDURE — 36415 COLL VENOUS BLD VENIPUNCTURE: CPT

## 2024-11-15 PROCEDURE — 6360000002 HC RX W HCPCS: Performed by: NURSE PRACTITIONER

## 2024-11-15 PROCEDURE — 85025 COMPLETE CBC W/AUTO DIFF WBC: CPT

## 2024-11-15 RX ORDER — DIPHENHYDRAMINE HYDROCHLORIDE 50 MG/ML
50 INJECTION INTRAMUSCULAR; INTRAVENOUS
OUTPATIENT
Start: 2024-11-22

## 2024-11-15 RX ORDER — ALBUTEROL SULFATE 90 UG/1
4 INHALANT RESPIRATORY (INHALATION) PRN
OUTPATIENT
Start: 2024-11-22

## 2024-11-15 RX ORDER — HYDROCORTISONE SODIUM SUCCINATE 100 MG/2ML
100 INJECTION INTRAMUSCULAR; INTRAVENOUS
OUTPATIENT
Start: 2024-11-22

## 2024-11-15 RX ORDER — SODIUM CHLORIDE 9 MG/ML
5-250 INJECTION, SOLUTION INTRAVENOUS PRN
Status: CANCELLED | OUTPATIENT
Start: 2024-11-22

## 2024-11-15 RX ORDER — ONDANSETRON 2 MG/ML
8 INJECTION INTRAMUSCULAR; INTRAVENOUS
OUTPATIENT
Start: 2024-11-22

## 2024-11-15 RX ORDER — HEPARIN 100 UNIT/ML
500 SYRINGE INTRAVENOUS PRN
OUTPATIENT
Start: 2024-11-22

## 2024-11-15 RX ORDER — SODIUM CHLORIDE 9 MG/ML
INJECTION, SOLUTION INTRAVENOUS CONTINUOUS
OUTPATIENT
Start: 2024-11-22

## 2024-11-15 RX ORDER — ACETAMINOPHEN 325 MG/1
650 TABLET ORAL
OUTPATIENT
Start: 2024-11-22

## 2024-11-15 RX ORDER — SODIUM CHLORIDE 9 MG/ML
5-250 INJECTION, SOLUTION INTRAVENOUS PRN
OUTPATIENT
Start: 2024-11-22

## 2024-11-15 RX ORDER — SODIUM CHLORIDE 9 MG/ML
5-250 INJECTION, SOLUTION INTRAVENOUS PRN
Status: DISCONTINUED | OUTPATIENT
Start: 2024-11-15 | End: 2024-11-16 | Stop reason: HOSPADM

## 2024-11-15 RX ORDER — SODIUM CHLORIDE 0.9 % (FLUSH) 0.9 %
5-40 SYRINGE (ML) INJECTION PRN
Status: CANCELLED | OUTPATIENT
Start: 2024-11-22

## 2024-11-15 RX ORDER — EPINEPHRINE 1 MG/ML
0.3 INJECTION, SOLUTION INTRAMUSCULAR; SUBCUTANEOUS PRN
OUTPATIENT
Start: 2024-11-22

## 2024-11-15 RX ADMIN — IRON SUCROSE 200 MG: 20 INJECTION, SOLUTION INTRAVENOUS at 15:47

## 2024-11-15 NOTE — PROGRESS NOTES
Pt arrived at [facility] ambulatory and in no acute distress for Venofer 1 of 5 infusion. Assessment completed. Pt experiencing poor sleep, intermittent neuropathy, weakness and fatigue. 24G PIV initiated in Right A/C. No labs drawn.    Mr. Azar's vitals were reviewed.  Patient Vitals for the past 24 hrs:   BP Temp Temp src Pulse Resp SpO2 Height Weight   11/15/24 1555 116/74 -- -- 75 -- -- -- --   11/15/24 1520 117/81 98.9 °F (37.2 °C) Temporal 79 16 96 % 1.778 m (5' 10\") 69.8 kg (153 lb 12.8 oz)       Peripheral IV 11/15/24 Right Antecubital (Active)   Site Assessment Clean, dry & intact 11/15/24 1525   Line Status Brisk blood return;Specimen collected;Flushed;Capped 11/15/24 1525   Phlebitis Assessment No symptoms 11/15/24 1525   Infiltration Assessment 0 11/15/24 1525   Alcohol Cap Used Yes 11/15/24 1525   Dressing Status New dressing applied 11/15/24 1525   Dressing Type Transparent 11/15/24 1525   Dressing Intervention New 11/15/24 1525       Medications Administered         iron sucrose (VENOFER) injection 200 mg Admin Date  11/15/2024 Action  Given Dose  200 mg Route  IntraVENous Documented By  Philomena Gibbs, RN        Pt tolerated treatment well. No adverse reaction noted. Pt politely declined to be observed for 30 minutes. PIV was flushed and removed and 2x2 w/coban placed. Pt discharged from [facility] ambulatory and in no acute distress at 1558. Pt to return for next appointment on 11/22/24.    Future Appointments   Date Time Provider Department Center   11/22/2024  3:00 PM [facility] MED INF CHAIR 3 [facility]   12/6/2024  3:00 PM [facility] MED INF CHAIR 3 [facility]   12/13/2024  3:00 PM [facility] MED INF CHAIR 3 [facility]   12/20/2024  3:00 PM [facility] MED INF CHAIR 3 [facility]     [

## 2024-11-19 ENCOUNTER — TELEPHONE (OUTPATIENT)
Age: 44
End: 2024-11-19

## 2024-11-22 ENCOUNTER — HOSPITAL ENCOUNTER (OUTPATIENT)
Facility: HOSPITAL | Age: 44
Setting detail: INFUSION SERIES
Discharge: HOME | End: 2024-11-22
Payer: COMMERCIAL

## 2024-11-22 VITALS
DIASTOLIC BLOOD PRESSURE: 78 MMHG | OXYGEN SATURATION: 96 % | RESPIRATION RATE: 18 BRPM | HEART RATE: 86 BPM | TEMPERATURE: 98.7 F | SYSTOLIC BLOOD PRESSURE: 125 MMHG

## 2024-11-22 DIAGNOSIS — D64.9 ACUTE ON CHRONIC ANEMIA: ICD-10-CM

## 2024-11-22 DIAGNOSIS — D64.9 ACUTE ANEMIA: Primary | ICD-10-CM

## 2024-11-22 PROCEDURE — 6360000002 HC RX W HCPCS: Performed by: NURSE PRACTITIONER

## 2024-11-22 PROCEDURE — 2580000003 HC RX 258: Performed by: NURSE PRACTITIONER

## 2024-11-22 PROCEDURE — 96374 THER/PROPH/DIAG INJ IV PUSH: CPT

## 2024-11-22 RX ORDER — SODIUM CHLORIDE 9 MG/ML
5-250 INJECTION, SOLUTION INTRAVENOUS PRN
Status: DISCONTINUED | OUTPATIENT
Start: 2024-11-22 | End: 2024-11-23 | Stop reason: HOSPADM

## 2024-11-22 RX ORDER — SODIUM CHLORIDE 0.9 % (FLUSH) 0.9 %
5-40 SYRINGE (ML) INJECTION PRN
OUTPATIENT
Start: 2024-11-29

## 2024-11-22 RX ORDER — SODIUM CHLORIDE 9 MG/ML
5-250 INJECTION, SOLUTION INTRAVENOUS PRN
OUTPATIENT
Start: 2024-11-29

## 2024-11-22 RX ORDER — SODIUM CHLORIDE 0.9 % (FLUSH) 0.9 %
5-40 SYRINGE (ML) INJECTION PRN
Status: DISCONTINUED | OUTPATIENT
Start: 2024-11-22 | End: 2024-11-23 | Stop reason: HOSPADM

## 2024-11-22 RX ORDER — ONDANSETRON 2 MG/ML
8 INJECTION INTRAMUSCULAR; INTRAVENOUS
OUTPATIENT
Start: 2024-11-29

## 2024-11-22 RX ORDER — EPINEPHRINE 1 MG/ML
0.3 INJECTION, SOLUTION INTRAMUSCULAR; SUBCUTANEOUS PRN
OUTPATIENT
Start: 2024-11-29

## 2024-11-22 RX ORDER — SODIUM CHLORIDE 9 MG/ML
INJECTION, SOLUTION INTRAVENOUS CONTINUOUS
OUTPATIENT
Start: 2024-11-29

## 2024-11-22 RX ORDER — ACETAMINOPHEN 325 MG/1
650 TABLET ORAL
OUTPATIENT
Start: 2024-11-29

## 2024-11-22 RX ORDER — HEPARIN 100 UNIT/ML
500 SYRINGE INTRAVENOUS PRN
OUTPATIENT
Start: 2024-11-29

## 2024-11-22 RX ORDER — DIPHENHYDRAMINE HYDROCHLORIDE 50 MG/ML
50 INJECTION INTRAMUSCULAR; INTRAVENOUS
OUTPATIENT
Start: 2024-11-29

## 2024-11-22 RX ORDER — HYDROCORTISONE SODIUM SUCCINATE 100 MG/2ML
100 INJECTION INTRAMUSCULAR; INTRAVENOUS
OUTPATIENT
Start: 2024-11-29

## 2024-11-22 RX ORDER — ALBUTEROL SULFATE 90 UG/1
4 INHALANT RESPIRATORY (INHALATION) PRN
OUTPATIENT
Start: 2024-11-29

## 2024-11-22 RX ADMIN — SODIUM CHLORIDE, PRESERVATIVE FREE 10 ML: 5 INJECTION INTRAVENOUS at 13:02

## 2024-11-22 RX ADMIN — SODIUM CHLORIDE, PRESERVATIVE FREE 10 ML: 5 INJECTION INTRAVENOUS at 13:07

## 2024-11-22 RX ADMIN — IRON SUCROSE 200 MG: 20 INJECTION, SOLUTION INTRAVENOUS at 13:02

## 2024-11-25 ENCOUNTER — TELEPHONE (OUTPATIENT)
Age: 44
End: 2024-11-25

## 2024-11-25 NOTE — TELEPHONE ENCOUNTER
La the pts spouse called and states that the pt has completed two sessions of treatment and he is experiencing the below symptoms:  Little to no energy, Loss of appetite,Blood in stool - dark in color (could be from straining but is worried with it being dark in color.)    She requests a call to discuss.

## 2024-11-25 NOTE — PROGRESS NOTES
[facility] Visit Note    Pt arrived to [facility] ambulatory in no acute distress at 1255 for Venofer.  Assessment unremarkable. PIV established without issue and positive blood return noted.        Medications Administered         iron sucrose (VENOFER) injection 200 mg Admin Date  11/22/2024 Action  Given Dose  200 mg Route  IntraVENous Documented By  Violet Saeed, RN        sodium chloride flush 0.9 % injection 5-40 mL Admin Date  11/22/2024 Action  Given Dose  10 mL Route  IntraVENous Documented By  Violet Saeed, RN        sodium chloride flush 0.9 % injection 5-40 mL Admin Date  11/22/2024 Action  Given Dose  10 mL Route  IntraVENous Documented By  Violet Saeed, RN             Pt tolerated treatment well. Pt declined observation post injection. Discharge instructions reviewed. No adverse reaction noted.  Port flushed per policy and needle removed, 2x2 and paper tape placed.  Pt discharged ambulatory in no acute distress at 1310, accompanied by self.  Next appointment 12/6/24 at 1130.

## 2024-11-25 NOTE — TELEPHONE ENCOUNTER
Case d/w NP Cara Duarte.  He is to stay the course. Follow up with GI.  Energy levels may not notice immediate change, also he has not completed infusions yet.    Called pt wife.  HIPAA verified by two patient identifiers.   She verbalized understanding of getting pt to follow up with GI as well as take stool softeners. Energy level may not be noticeable change right away.

## 2024-12-06 ENCOUNTER — HOSPITAL ENCOUNTER (OUTPATIENT)
Facility: HOSPITAL | Age: 44
Setting detail: INFUSION SERIES
Discharge: HOME | End: 2024-12-06
Payer: COMMERCIAL

## 2024-12-06 VITALS
HEART RATE: 79 BPM | DIASTOLIC BLOOD PRESSURE: 92 MMHG | SYSTOLIC BLOOD PRESSURE: 131 MMHG | RESPIRATION RATE: 18 BRPM | OXYGEN SATURATION: 97 % | TEMPERATURE: 97.9 F

## 2024-12-06 DIAGNOSIS — D64.9 ACUTE ON CHRONIC ANEMIA: ICD-10-CM

## 2024-12-06 DIAGNOSIS — D64.9 ACUTE ANEMIA: Primary | ICD-10-CM

## 2024-12-06 PROCEDURE — 96374 THER/PROPH/DIAG INJ IV PUSH: CPT

## 2024-12-06 PROCEDURE — 6360000002 HC RX W HCPCS: Performed by: NURSE PRACTITIONER

## 2024-12-06 RX ORDER — HYDROCORTISONE SODIUM SUCCINATE 100 MG/2ML
100 INJECTION INTRAMUSCULAR; INTRAVENOUS
OUTPATIENT
Start: 2024-12-13

## 2024-12-06 RX ORDER — SODIUM CHLORIDE 0.9 % (FLUSH) 0.9 %
5-40 SYRINGE (ML) INJECTION PRN
Status: DISCONTINUED | OUTPATIENT
Start: 2024-12-06 | End: 2024-12-07 | Stop reason: HOSPADM

## 2024-12-06 RX ORDER — ACETAMINOPHEN 325 MG/1
650 TABLET ORAL
OUTPATIENT
Start: 2024-12-13

## 2024-12-06 RX ORDER — SODIUM CHLORIDE 9 MG/ML
5-250 INJECTION, SOLUTION INTRAVENOUS PRN
OUTPATIENT
Start: 2024-12-13

## 2024-12-06 RX ORDER — SODIUM CHLORIDE 9 MG/ML
INJECTION, SOLUTION INTRAVENOUS CONTINUOUS
OUTPATIENT
Start: 2024-12-13

## 2024-12-06 RX ORDER — SODIUM CHLORIDE 0.9 % (FLUSH) 0.9 %
5-40 SYRINGE (ML) INJECTION PRN
Status: CANCELLED | OUTPATIENT
Start: 2024-12-13

## 2024-12-06 RX ORDER — HEPARIN 100 UNIT/ML
500 SYRINGE INTRAVENOUS PRN
OUTPATIENT
Start: 2024-12-13

## 2024-12-06 RX ORDER — DIPHENHYDRAMINE HYDROCHLORIDE 50 MG/ML
50 INJECTION INTRAMUSCULAR; INTRAVENOUS
OUTPATIENT
Start: 2024-12-13

## 2024-12-06 RX ORDER — ONDANSETRON 2 MG/ML
8 INJECTION INTRAMUSCULAR; INTRAVENOUS
OUTPATIENT
Start: 2024-12-13

## 2024-12-06 RX ORDER — ALBUTEROL SULFATE 90 UG/1
4 INHALANT RESPIRATORY (INHALATION) PRN
OUTPATIENT
Start: 2024-12-13

## 2024-12-06 RX ORDER — EPINEPHRINE 1 MG/ML
0.3 INJECTION, SOLUTION INTRAMUSCULAR; SUBCUTANEOUS PRN
OUTPATIENT
Start: 2024-12-13

## 2024-12-06 RX ORDER — SODIUM CHLORIDE 9 MG/ML
5-250 INJECTION, SOLUTION INTRAVENOUS PRN
Status: DISCONTINUED | OUTPATIENT
Start: 2024-12-06 | End: 2024-12-07 | Stop reason: HOSPADM

## 2024-12-06 RX ADMIN — IRON SUCROSE 200 MG: 20 INJECTION, SOLUTION INTRAVENOUS at 11:37

## 2024-12-06 NOTE — PROGRESS NOTES
Name: Jhony Azar    MRN: [id number]         : 1980    Mr. Azar Arrived ambulatory and in no distress for Venofer Dose 3 of 5.  Assessment was completed, no acute issues at this time, no new complaints voiced.  24 G PIV established to left arm without difficulty.     Mr. Azar's vitals were reviewed.  Patient Vitals for the past 24 hrs:   BP Temp Temp src Pulse Resp SpO2   24 1145 (!) 131/92 -- -- 79 18 --   24 1126 132/87 97.9 °F (36.6 °C) Temporal 76 18 97 %     Medications:  Medications Administered         iron sucrose (VENOFER) injection 200 mg Admin Date  2024 Action  Given Dose  200 mg Route  IntraVENous Documented By  Amanda Nazario, RN          Mr. Azar tolerated treatment well and was discharged from [facility] in stable condition. PIV flushed & removed.  Discharge instructions reviewed with patient, verbalized understanding.  Patient politely declined to stay 30 minutes post infusion for monitoring.  He is to return on 2024 for his next appointment.    AMANDA NAZARIO RN  2024

## 2024-12-13 ENCOUNTER — HOSPITAL ENCOUNTER (OUTPATIENT)
Facility: HOSPITAL | Age: 44
Setting detail: INFUSION SERIES
Discharge: HOME | End: 2024-12-13
Payer: COMMERCIAL

## 2024-12-13 VITALS
OXYGEN SATURATION: 96 % | TEMPERATURE: 98.9 F | DIASTOLIC BLOOD PRESSURE: 79 MMHG | HEART RATE: 76 BPM | SYSTOLIC BLOOD PRESSURE: 116 MMHG | RESPIRATION RATE: 16 BRPM

## 2024-12-13 DIAGNOSIS — D64.9 ACUTE ANEMIA: Primary | ICD-10-CM

## 2024-12-13 DIAGNOSIS — D64.9 ACUTE ON CHRONIC ANEMIA: ICD-10-CM

## 2024-12-13 PROCEDURE — 6360000002 HC RX W HCPCS: Performed by: NURSE PRACTITIONER

## 2024-12-13 PROCEDURE — 96374 THER/PROPH/DIAG INJ IV PUSH: CPT

## 2024-12-13 PROCEDURE — 2580000003 HC RX 258: Performed by: NURSE PRACTITIONER

## 2024-12-13 RX ORDER — ACETAMINOPHEN 325 MG/1
650 TABLET ORAL
OUTPATIENT
Start: 2024-12-20

## 2024-12-13 RX ORDER — SODIUM CHLORIDE 9 MG/ML
5-250 INJECTION, SOLUTION INTRAVENOUS PRN
Status: CANCELLED | OUTPATIENT
Start: 2024-12-20

## 2024-12-13 RX ORDER — ALBUTEROL SULFATE 90 UG/1
4 INHALANT RESPIRATORY (INHALATION) PRN
OUTPATIENT
Start: 2024-12-20

## 2024-12-13 RX ORDER — ONDANSETRON 2 MG/ML
8 INJECTION INTRAMUSCULAR; INTRAVENOUS
OUTPATIENT
Start: 2024-12-20

## 2024-12-13 RX ORDER — EPINEPHRINE 1 MG/ML
0.3 INJECTION, SOLUTION INTRAMUSCULAR; SUBCUTANEOUS PRN
OUTPATIENT
Start: 2024-12-20

## 2024-12-13 RX ORDER — HEPARIN 100 UNIT/ML
500 SYRINGE INTRAVENOUS PRN
OUTPATIENT
Start: 2024-12-20

## 2024-12-13 RX ORDER — SODIUM CHLORIDE 0.9 % (FLUSH) 0.9 %
5-40 SYRINGE (ML) INJECTION PRN
OUTPATIENT
Start: 2024-12-20

## 2024-12-13 RX ORDER — SODIUM CHLORIDE 9 MG/ML
INJECTION, SOLUTION INTRAVENOUS CONTINUOUS
OUTPATIENT
Start: 2024-12-20

## 2024-12-13 RX ORDER — SODIUM CHLORIDE 0.9 % (FLUSH) 0.9 %
5-40 SYRINGE (ML) INJECTION PRN
Status: DISCONTINUED | OUTPATIENT
Start: 2024-12-13 | End: 2024-12-14 | Stop reason: HOSPADM

## 2024-12-13 RX ORDER — SODIUM CHLORIDE 9 MG/ML
5-250 INJECTION, SOLUTION INTRAVENOUS PRN
OUTPATIENT
Start: 2024-12-20

## 2024-12-13 RX ORDER — DIPHENHYDRAMINE HYDROCHLORIDE 50 MG/ML
50 INJECTION INTRAMUSCULAR; INTRAVENOUS
OUTPATIENT
Start: 2024-12-20

## 2024-12-13 RX ORDER — HYDROCORTISONE SODIUM SUCCINATE 100 MG/2ML
100 INJECTION INTRAMUSCULAR; INTRAVENOUS
OUTPATIENT
Start: 2024-12-20

## 2024-12-13 RX ADMIN — SODIUM CHLORIDE, PRESERVATIVE FREE 10 ML: 5 INJECTION INTRAVENOUS at 11:59

## 2024-12-13 RX ADMIN — IRON SUCROSE 200 MG: 20 INJECTION, SOLUTION INTRAVENOUS at 11:58

## 2024-12-13 NOTE — PROGRESS NOTES
Outpatient Infusion Center Progress Note    1140- Pt admit to [facility] for Venofer dose 4 of 5 in stable condition. Assessment completed. He c/o blood in his stool for 2 days after each iron treatment.  He had the same happen with oral iron when he would become constipated.  Hx of internal hemorrhoids.     24G PIV established in right AC with positive blood return noted.    Patient Vitals for the past 12 hrs:   Temp Pulse Resp BP SpO2   12/13/24 1205 -- 76 -- 116/79 96 %   12/13/24 1143 98.9 °F (37.2 °C) 80 16 128/83 100 %     The following medications administered:  Medications Administered         iron sucrose (VENOFER) injection 200 mg Admin Date  12/13/2024 Action  Given Dose  200 mg Route  IntraVENous Documented By  Farshad Yin RN        sodium chloride flush 0.9 % injection 5-40 mL Admin Date  12/13/2024 Action  Given Dose  10 mL Route  IntraVENous Documented By  Farshad Yin RN          Pt monitored x 30 mins post infusion. Pt tolerated well, no s/s of adverse reaction noted. PIV flushed and discontinued. Site wrapped in gauze and coban.    Pt provided with education on possible side effects of medication along with discharge instructions. Pt verbalized understanding.      1210- D/C home in no distress.     Pt aware of next appointment scheduled for:     Future Appointments   Date Time Provider Department Center   12/20/2024 11:30 AM [facility] INF CHAIR 3 Pending sale to [facility]

## 2024-12-20 ENCOUNTER — HOSPITAL ENCOUNTER (OUTPATIENT)
Facility: HOSPITAL | Age: 44
Setting detail: INFUSION SERIES
Discharge: HOME | End: 2024-12-20
Payer: COMMERCIAL

## 2024-12-20 VITALS
SYSTOLIC BLOOD PRESSURE: 128 MMHG | TEMPERATURE: 98.3 F | OXYGEN SATURATION: 98 % | DIASTOLIC BLOOD PRESSURE: 83 MMHG | HEART RATE: 76 BPM

## 2024-12-20 DIAGNOSIS — D64.9 ACUTE ANEMIA: Primary | ICD-10-CM

## 2024-12-20 DIAGNOSIS — D64.9 ACUTE ON CHRONIC ANEMIA: ICD-10-CM

## 2024-12-20 PROCEDURE — 6360000002 HC RX W HCPCS: Performed by: NURSE PRACTITIONER

## 2024-12-20 PROCEDURE — 96374 THER/PROPH/DIAG INJ IV PUSH: CPT

## 2024-12-20 RX ORDER — SODIUM CHLORIDE 9 MG/ML
5-250 INJECTION, SOLUTION INTRAVENOUS PRN
OUTPATIENT
Start: 2024-12-20

## 2024-12-20 RX ORDER — DIPHENHYDRAMINE HYDROCHLORIDE 50 MG/ML
50 INJECTION INTRAMUSCULAR; INTRAVENOUS
OUTPATIENT
Start: 2024-12-20

## 2024-12-20 RX ORDER — ONDANSETRON 2 MG/ML
8 INJECTION INTRAMUSCULAR; INTRAVENOUS
OUTPATIENT
Start: 2024-12-20

## 2024-12-20 RX ORDER — SODIUM CHLORIDE 9 MG/ML
INJECTION, SOLUTION INTRAVENOUS CONTINUOUS
OUTPATIENT
Start: 2024-12-20

## 2024-12-20 RX ORDER — EPINEPHRINE 1 MG/ML
0.3 INJECTION, SOLUTION INTRAMUSCULAR; SUBCUTANEOUS PRN
OUTPATIENT
Start: 2024-12-20

## 2024-12-20 RX ORDER — HYDROCORTISONE SODIUM SUCCINATE 100 MG/2ML
100 INJECTION INTRAMUSCULAR; INTRAVENOUS
OUTPATIENT
Start: 2024-12-20

## 2024-12-20 RX ORDER — SODIUM CHLORIDE 0.9 % (FLUSH) 0.9 %
5-40 SYRINGE (ML) INJECTION PRN
OUTPATIENT
Start: 2024-12-20

## 2024-12-20 RX ORDER — ALBUTEROL SULFATE 90 UG/1
4 INHALANT RESPIRATORY (INHALATION) PRN
OUTPATIENT
Start: 2024-12-20

## 2024-12-20 RX ORDER — SODIUM CHLORIDE 9 MG/ML
5-250 INJECTION, SOLUTION INTRAVENOUS PRN
Status: DISCONTINUED | OUTPATIENT
Start: 2024-12-20 | End: 2024-12-21 | Stop reason: HOSPADM

## 2024-12-20 RX ORDER — ACETAMINOPHEN 325 MG/1
650 TABLET ORAL
OUTPATIENT
Start: 2024-12-20

## 2024-12-20 RX ORDER — HEPARIN 100 UNIT/ML
500 SYRINGE INTRAVENOUS PRN
OUTPATIENT
Start: 2024-12-20

## 2024-12-20 RX ADMIN — IRON SUCROSE 200 MG: 20 INJECTION, SOLUTION INTRAVENOUS at 11:59

## 2024-12-20 ASSESSMENT — PAIN SCALES - GENERAL: PAINLEVEL_OUTOF10: 0

## 2024-12-20 NOTE — PROGRESS NOTES
[facility] Progress Note      Date: 2024    Name: Jhony Azar    MRN: [id number]         : 1980    1150 Patient arrives for Venofer  without acute problems. Please see Epic for complete assessment and education provided.    Vital signs stable throughout and prior to discharge. Patient tolerated procedure well and was discharged without incident. Patient is aware of no further [facility] appointments and to follow up with referring provider for any questions or concerns.        Mr. Azar's vitals were reviewed prior to and after treatment.   Patient Vitals for the past 12 hrs:   Temp Pulse BP SpO2   24 1153 98.3 °F (36.8 °C) 76 128/83 98 %         Medications given:  Medications Administered         iron sucrose (VENOFER) injection 200 mg Admin Date  2024 Action  Given Dose  200 mg Route  IntraVENous Documented By  Alejandro Montoya, RN              Mr. Azar tolerated the infusion, and had no complaints.  Patient declined to wait the recommended 30 hour post infusion observation period.    [facility]. Discharge Instructions provided to patient, patient verbalized understanding but denied the request for a copy of after visit summary.     No future appointments.    ALEJANDRO MONTOYA RN  2024  12:09 PM

## 2025-01-31 RX ORDER — HYDROXYZINE HYDROCHLORIDE 50 MG/1
50 TABLET, FILM COATED ORAL EVERY 8 HOURS PRN
Qty: 30 TABLET | Refills: 0 | Status: SHIPPED | OUTPATIENT
Start: 2025-01-31 | End: 2025-02-10

## 2025-03-11 ENCOUNTER — OFFICE VISIT (OUTPATIENT)
Age: 45
End: 2025-03-11
Payer: COMMERCIAL

## 2025-03-11 VITALS
HEART RATE: 81 BPM | BODY MASS INDEX: 22.76 KG/M2 | RESPIRATION RATE: 20 BRPM | WEIGHT: 159 LBS | OXYGEN SATURATION: 98 % | TEMPERATURE: 98.2 F | HEIGHT: 70 IN | SYSTOLIC BLOOD PRESSURE: 120 MMHG | DIASTOLIC BLOOD PRESSURE: 81 MMHG

## 2025-03-11 DIAGNOSIS — E61.1 IRON DEFICIENCY: Primary | ICD-10-CM

## 2025-03-11 DIAGNOSIS — D50.9 IRON DEFICIENCY ANEMIA, UNSPECIFIED IRON DEFICIENCY ANEMIA TYPE: ICD-10-CM

## 2025-03-11 DIAGNOSIS — E55.9 VITAMIN D DEFICIENCY: ICD-10-CM

## 2025-03-11 DIAGNOSIS — F50.89 PICA: ICD-10-CM

## 2025-03-11 DIAGNOSIS — E53.8 B12 DEFICIENCY: ICD-10-CM

## 2025-03-11 DIAGNOSIS — R53.83 OTHER FATIGUE: ICD-10-CM

## 2025-03-11 PROCEDURE — 99214 OFFICE O/P EST MOD 30 MIN: CPT | Performed by: FAMILY MEDICINE

## 2025-03-11 PROCEDURE — 3074F SYST BP LT 130 MM HG: CPT | Performed by: FAMILY MEDICINE

## 2025-03-11 PROCEDURE — 3079F DIAST BP 80-89 MM HG: CPT | Performed by: FAMILY MEDICINE

## 2025-03-11 SDOH — ECONOMIC STABILITY: FOOD INSECURITY: WITHIN THE PAST 12 MONTHS, THE FOOD YOU BOUGHT JUST DIDN'T LAST AND YOU DIDN'T HAVE MONEY TO GET MORE.: PATIENT DECLINED

## 2025-03-11 SDOH — ECONOMIC STABILITY: FOOD INSECURITY: WITHIN THE PAST 12 MONTHS, YOU WORRIED THAT YOUR FOOD WOULD RUN OUT BEFORE YOU GOT MONEY TO BUY MORE.: PATIENT DECLINED

## 2025-03-11 SDOH — ECONOMIC STABILITY: INCOME INSECURITY: IN THE LAST 12 MONTHS, WAS THERE A TIME WHEN YOU WERE NOT ABLE TO PAY THE MORTGAGE OR RENT ON TIME?: YES

## 2025-03-11 SDOH — ECONOMIC STABILITY: TRANSPORTATION INSECURITY
IN THE PAST 12 MONTHS, HAS THE LACK OF TRANSPORTATION KEPT YOU FROM MEDICAL APPOINTMENTS OR FROM GETTING MEDICATIONS?: PATIENT DECLINED

## 2025-03-11 ASSESSMENT — PATIENT HEALTH QUESTIONNAIRE - PHQ9
SUM OF ALL RESPONSES TO PHQ QUESTIONS 1-9: 0
2. FEELING DOWN, DEPRESSED OR HOPELESS: NOT AT ALL
SUM OF ALL RESPONSES TO PHQ QUESTIONS 1-9: 0
1. LITTLE INTEREST OR PLEASURE IN DOING THINGS: NOT AT ALL
SUM OF ALL RESPONSES TO PHQ QUESTIONS 1-9: 0
SUM OF ALL RESPONSES TO PHQ QUESTIONS 1-9: 0

## 2025-03-11 NOTE — PATIENT INSTRUCTIONS
[facility] [phone]    [facility] [phone]    [facility] [phone]    [facility] [phone]

## 2025-03-11 NOTE — PROGRESS NOTES
The patient identity was confirmed with  and First/Last Name. Medications and Allergies reviewed with patient, as well as any new diagnosis/procedures. Depression Screening and SDOH Screening done today.    Chief Complaint   Patient presents with    Skin Problem     Itching, patient thinks iron is low.        Vitals:    25 1403   BP: 120/81   Pulse: 81   Resp: 20   Temp: 98.2 °F (36.8 °C)   SpO2: 98%       Health Maintenance Due   Topic Date Due    Varicella vaccine (1 of 2 - 13+ 2-dose series) Never done    Hepatitis C screen  Never done    Hepatitis B vaccine (1 of 3 - 19+ 3-dose series) Never done    Pneumococcal 0-49 years Vaccine (1 of 2 - PCV) Never done    Flu vaccine (1) Never done    COVID-19 Vaccine (2024- season) 2024    Depression Screen  01/15/2025          \"Have you been to the ER, urgent care clinic since your last visit?  Hospitalized since your last visit?\"    NO    “Have you seen or consulted any other health care providers outside our system since your last visit?”    NO            
fever, fatigue, weight loss or weight gain      Cardiac:    No chest pain      Resp:   No cough or shortness of breath      Neuro   No loss of consciousness, dizziness, seizures      Physical Exam:  Blood pressure 120/81, pulse 81, temperature 98.2 °F (36.8 °C), temperature source Temporal, resp. rate 20, height 1.778 m (5' 10\"), weight 72.1 kg (159 lb), SpO2 98%.  GEN: No apparent distress. Alert and oriented and responds to all questions appropriately.   NEUROLOGIC:  No focal neurologic deficits. Strength and sensation grossly intact.  Coordination and gait grossly intact.   EXT: Well perfused. No edema.  SKIN: No obvious rashes.  Lungs clear to auscultation bilaterally  CV regular rate rhythm no murmur       Assessment and Plan     History of iron deficiency anemia.  Frequent episodes of iron deficiency  Check labs    ED/colonoscopy apparently unremarkable 2023    Hematology following.  They have encouraged him to follow-up with GI.    He is intolerant of oral iron, causes constipation and tummy upset      Labs reviewed  He received iron infusion most recently November into December 2024    Despite this his iron levels are actually even lower than they were prior to his infusion ferritin 11 down from 21  Iron saturation 3 down from 41    Will offer another iron infusion.        Itching  Similar complaint to our discussion in October.  Refer to dermatology that time but he has not been yet.  Refer to dermatology again.  Provided with phone numbers      ICD-10-CM    1. Iron deficiency  E61.1 Ferritin     Iron and TIBC      2. Vitamin D deficiency  E55.9 Vitamin D 25 Hydroxy      3. B12 deficiency  E53.8 Vitamin B12 & Folate      4. Iron deficiency anemia, unspecified iron deficiency anemia type  D50.9 Comprehensive Metabolic Panel     CBC with Auto Differential     T4, Free     TSH      5. Pica  F50.89       6. Other fatigue  R53.83 T4, Free     TSH            AVS given. Pt expressed understanding of

## 2025-03-13 ENCOUNTER — RESULTS FOLLOW-UP (OUTPATIENT)
Age: 45
End: 2025-03-13

## 2025-03-13 LAB
25(OH)D3+25(OH)D2 SERPL-MCNC: 5.3 NG/ML (ref 30–100)
ALBUMIN SERPL-MCNC: 4.7 G/DL (ref 4.1–5.1)
ALP SERPL-CCNC: 47 IU/L (ref 44–121)
ALT SERPL-CCNC: 10 IU/L (ref 0–44)
AST SERPL-CCNC: 33 IU/L (ref 0–40)
BASOPHILS # BLD AUTO: 0 X10E3/UL (ref 0–0.2)
BASOPHILS NFR BLD AUTO: 1 %
BILIRUB SERPL-MCNC: <0.2 MG/DL (ref 0–1.2)
BUN SERPL-MCNC: 9 MG/DL (ref 6–24)
BUN/CREAT SERPL: 10 (ref 9–20)
CALCIUM SERPL-MCNC: 9.5 MG/DL (ref 8.7–10.2)
CHLORIDE SERPL-SCNC: 104 MMOL/L (ref 96–106)
CO2 SERPL-SCNC: 20 MMOL/L (ref 20–29)
CREAT SERPL-MCNC: 0.9 MG/DL (ref 0.76–1.27)
EGFRCR SERPLBLD CKD-EPI 2021: 109 ML/MIN/1.73
EOSINOPHIL # BLD AUTO: 0.1 X10E3/UL (ref 0–0.4)
EOSINOPHIL NFR BLD AUTO: 2 %
ERYTHROCYTE [DISTWIDTH] IN BLOOD BY AUTOMATED COUNT: 18.5 % (ref 11.6–15.4)
FERRITIN SERPL-MCNC: 11 NG/ML (ref 30–400)
FOLATE SERPL-MCNC: 9.4 NG/ML
GLOBULIN SER CALC-MCNC: 2.6 G/DL (ref 1.5–4.5)
GLUCOSE SERPL-MCNC: 90 MG/DL (ref 70–99)
HCT VFR BLD AUTO: 34.1 % (ref 37.5–51)
HGB BLD-MCNC: 10.1 G/DL (ref 13–17.7)
IMM GRANULOCYTES # BLD AUTO: 0 X10E3/UL (ref 0–0.1)
IMM GRANULOCYTES NFR BLD AUTO: 1 %
IRON SATN MFR SERPL: 3 % (ref 15–55)
IRON SERPL-MCNC: 13 UG/DL (ref 38–169)
LYMPHOCYTES # BLD AUTO: 1.6 X10E3/UL (ref 0.7–3.1)
LYMPHOCYTES NFR BLD AUTO: 43 %
MCH RBC QN AUTO: 23.8 PG (ref 26.6–33)
MCHC RBC AUTO-ENTMCNC: 29.6 G/DL (ref 31.5–35.7)
MCV RBC AUTO: 80 FL (ref 79–97)
MONOCYTES # BLD AUTO: 0.3 X10E3/UL (ref 0.1–0.9)
MONOCYTES NFR BLD AUTO: 8 %
NEUTROPHILS # BLD AUTO: 1.7 X10E3/UL (ref 1.4–7)
NEUTROPHILS NFR BLD AUTO: 45 %
PLATELET # BLD AUTO: 247 X10E3/UL (ref 150–450)
POTASSIUM SERPL-SCNC: 4.1 MMOL/L (ref 3.5–5.2)
PROT SERPL-MCNC: 7.3 G/DL (ref 6–8.5)
RBC # BLD AUTO: 4.25 X10E6/UL (ref 4.14–5.8)
SODIUM SERPL-SCNC: 141 MMOL/L (ref 134–144)
T4 FREE SERPL-MCNC: 1.26 NG/DL (ref 0.82–1.77)
TIBC SERPL-MCNC: 502 UG/DL (ref 250–450)
TSH SERPL DL<=0.005 MIU/L-ACNC: 0.44 UIU/ML (ref 0.45–4.5)
UIBC SERPL-MCNC: 489 UG/DL (ref 111–343)
VIT B12 SERPL-MCNC: 376 PG/ML (ref 232–1245)
WBC # BLD AUTO: 3.8 X10E3/UL (ref 3.4–10.8)

## 2025-03-13 NOTE — RESULT ENCOUNTER NOTE
I called patient and let him know the lab results from Dr. Leahy;    Patient would like to use [facility], states that last time it took them awhile so that's why they ended up at [facility]. [facility] the process if possible.    I can fax order to [phone] once it is placed, wasn't sure if I can use the previous form filled out for [facility] for this patient.     Thanks.

## 2025-03-13 NOTE — TELEPHONE ENCOUNTER
Labs reviewed.  Iron levels are extremely low although thankfully his blood counts are okay.    I suggest iron infusion and then appointment to recheck labs in 2 months.  Purpose of regular follow-up will be to make sure that we are staying ahead of his problem instead of reacting to it after he is already feeling bad

## 2025-03-14 ENCOUNTER — TELEPHONE (OUTPATIENT)
Age: 45
End: 2025-03-14

## 2025-03-14 NOTE — TELEPHONE ENCOUNTER
Ayah from [facility] was calling because provider sent an order for the patient back in 11/2024, they have never been able to reach the patient. Patient's wife has now contacted the office so he can begin the infusions, but it has been so long they will need new office notes and most recent labs. Fax # is [phone].  [phone]

## 2025-03-24 ENCOUNTER — TELEPHONE (OUTPATIENT)
Age: 45
End: 2025-03-24

## 2025-03-24 NOTE — TELEPHONE ENCOUNTER
Ayah with [facility] is calling to inform the insurance needs a Peer to Peer completed before they will approve the infusion Feraheme.    That can be completed at [phone] (phone)

## 2025-03-24 NOTE — TELEPHONE ENCOUNTER
Patient's wife was reaching out regarding [facility] saying they need to do a peer review with provider to deem the patient necessary for these infusions. She says they have had problems getting his infusions at [facility] before. [facility] gives them no problems.  [phone].

## 2025-03-25 RX ORDER — ALBUTEROL SULFATE 90 UG/1
4 INHALANT RESPIRATORY (INHALATION) PRN
Status: CANCELLED | OUTPATIENT
Start: 2025-03-25

## 2025-03-25 RX ORDER — EPINEPHRINE 1 MG/ML
0.3 INJECTION, SOLUTION INTRAMUSCULAR; SUBCUTANEOUS PRN
Status: CANCELLED | OUTPATIENT
Start: 2025-03-25

## 2025-03-25 RX ORDER — SODIUM CHLORIDE 9 MG/ML
5-250 INJECTION, SOLUTION INTRAVENOUS PRN
Status: CANCELLED | OUTPATIENT
Start: 2025-03-25

## 2025-03-25 RX ORDER — SODIUM CHLORIDE 9 MG/ML
INJECTION, SOLUTION INTRAVENOUS CONTINUOUS
Status: CANCELLED | OUTPATIENT
Start: 2025-03-25

## 2025-03-25 RX ORDER — ACETAMINOPHEN 325 MG/1
650 TABLET ORAL
Status: CANCELLED | OUTPATIENT
Start: 2025-03-25

## 2025-03-25 RX ORDER — HYDROCORTISONE SODIUM SUCCINATE 100 MG/2ML
100 INJECTION INTRAMUSCULAR; INTRAVENOUS
Status: CANCELLED | OUTPATIENT
Start: 2025-03-25

## 2025-03-25 RX ORDER — DIPHENHYDRAMINE HYDROCHLORIDE 50 MG/ML
50 INJECTION, SOLUTION INTRAMUSCULAR; INTRAVENOUS
Status: CANCELLED | OUTPATIENT
Start: 2025-03-25

## 2025-03-25 RX ORDER — ONDANSETRON 2 MG/ML
8 INJECTION INTRAMUSCULAR; INTRAVENOUS
Status: CANCELLED | OUTPATIENT
Start: 2025-03-25

## 2025-03-27 ENCOUNTER — TELEPHONE (OUTPATIENT)
Age: 45
End: 2025-03-27

## 2025-03-27 NOTE — TELEPHONE ENCOUNTER
Saniya with [facility] called to give us information on referral that we sent over.     She  is calling to inform the insurance needs a Peer to Peer completed before they will approve the infusion Feraheme. She also states it might go through if you want to do Venifer instead of Feraheme.    She would like to be updated. Her number is: [phone] Extension [phone]     As patient is already scheduled with them, and can move forward with it if peer to peer is completed or medication is changed.    Please advise.

## 2025-03-28 RX ORDER — ACETAMINOPHEN 325 MG/1
650 TABLET ORAL
OUTPATIENT
Start: 2025-04-01

## 2025-03-28 RX ORDER — SODIUM CHLORIDE 9 MG/ML
5-250 INJECTION, SOLUTION INTRAVENOUS PRN
Status: CANCELLED | OUTPATIENT
Start: 2025-04-01

## 2025-03-28 RX ORDER — SODIUM CHLORIDE 9 MG/ML
INJECTION, SOLUTION INTRAVENOUS CONTINUOUS
OUTPATIENT
Start: 2025-04-01

## 2025-03-28 RX ORDER — ONDANSETRON 2 MG/ML
8 INJECTION INTRAMUSCULAR; INTRAVENOUS
OUTPATIENT
Start: 2025-04-01

## 2025-03-28 RX ORDER — SODIUM CHLORIDE 0.9 % (FLUSH) 0.9 %
5-40 SYRINGE (ML) INJECTION PRN
Status: CANCELLED | OUTPATIENT
Start: 2025-04-01

## 2025-03-28 RX ORDER — ALBUTEROL SULFATE 90 UG/1
4 INHALANT RESPIRATORY (INHALATION) PRN
OUTPATIENT
Start: 2025-04-01

## 2025-03-28 RX ORDER — EPINEPHRINE 1 MG/ML
0.3 INJECTION, SOLUTION INTRAMUSCULAR; SUBCUTANEOUS PRN
OUTPATIENT
Start: 2025-04-01

## 2025-03-28 RX ORDER — HYDROCORTISONE SODIUM SUCCINATE 100 MG/2ML
100 INJECTION INTRAMUSCULAR; INTRAVENOUS
OUTPATIENT
Start: 2025-04-01

## 2025-03-28 RX ORDER — DIPHENHYDRAMINE HYDROCHLORIDE 50 MG/ML
50 INJECTION, SOLUTION INTRAMUSCULAR; INTRAVENOUS
OUTPATIENT
Start: 2025-04-01

## 2025-03-28 RX ORDER — SODIUM CHLORIDE 9 MG/ML
5-250 INJECTION, SOLUTION INTRAVENOUS PRN
OUTPATIENT
Start: 2025-04-01

## 2025-04-01 ENCOUNTER — HOSPITAL ENCOUNTER (OUTPATIENT)
Facility: HOSPITAL | Age: 45
Setting detail: INFUSION SERIES
End: 2025-04-01

## 2025-04-07 ENCOUNTER — HOSPITAL ENCOUNTER (OUTPATIENT)
Facility: HOSPITAL | Age: 45
Setting detail: INFUSION SERIES
Discharge: HOME | End: 2025-04-07
Payer: COMMERCIAL

## 2025-04-07 VITALS
RESPIRATION RATE: 16 BRPM | DIASTOLIC BLOOD PRESSURE: 76 MMHG | TEMPERATURE: 98.9 F | BODY MASS INDEX: 22.97 KG/M2 | HEART RATE: 91 BPM | WEIGHT: 160.1 LBS | SYSTOLIC BLOOD PRESSURE: 115 MMHG | OXYGEN SATURATION: 97 %

## 2025-04-07 DIAGNOSIS — D64.9 ACUTE ANEMIA: Primary | ICD-10-CM

## 2025-04-07 DIAGNOSIS — D64.9 ACUTE ON CHRONIC ANEMIA: ICD-10-CM

## 2025-04-07 PROCEDURE — 6360000002 HC RX W HCPCS: Performed by: FAMILY MEDICINE

## 2025-04-07 PROCEDURE — 2500000003 HC RX 250 WO HCPCS: Performed by: FAMILY MEDICINE

## 2025-04-07 PROCEDURE — 96374 THER/PROPH/DIAG INJ IV PUSH: CPT

## 2025-04-07 PROCEDURE — 2580000003 HC RX 258: Performed by: FAMILY MEDICINE

## 2025-04-07 RX ORDER — SODIUM CHLORIDE 9 MG/ML
5-250 INJECTION, SOLUTION INTRAVENOUS PRN
Status: DISCONTINUED | OUTPATIENT
Start: 2025-04-07 | End: 2025-04-08 | Stop reason: HOSPADM

## 2025-04-07 RX ORDER — HYDROCORTISONE SODIUM SUCCINATE 100 MG/2ML
100 INJECTION INTRAMUSCULAR; INTRAVENOUS
OUTPATIENT
Start: 2025-04-07

## 2025-04-07 RX ORDER — DIPHENHYDRAMINE HYDROCHLORIDE 50 MG/ML
50 INJECTION, SOLUTION INTRAMUSCULAR; INTRAVENOUS
OUTPATIENT
Start: 2025-04-07

## 2025-04-07 RX ORDER — SODIUM CHLORIDE 9 MG/ML
5-250 INJECTION, SOLUTION INTRAVENOUS PRN
OUTPATIENT
Start: 2025-04-07

## 2025-04-07 RX ORDER — SODIUM CHLORIDE 0.9 % (FLUSH) 0.9 %
5-40 SYRINGE (ML) INJECTION PRN
Status: DISCONTINUED | OUTPATIENT
Start: 2025-04-07 | End: 2025-04-08 | Stop reason: HOSPADM

## 2025-04-07 RX ORDER — ALBUTEROL SULFATE 90 UG/1
4 INHALANT RESPIRATORY (INHALATION) PRN
OUTPATIENT
Start: 2025-04-07

## 2025-04-07 RX ORDER — SODIUM CHLORIDE 0.9 % (FLUSH) 0.9 %
5-40 SYRINGE (ML) INJECTION PRN
OUTPATIENT
Start: 2025-04-07

## 2025-04-07 RX ORDER — SODIUM CHLORIDE 9 MG/ML
INJECTION, SOLUTION INTRAVENOUS CONTINUOUS
OUTPATIENT
Start: 2025-04-07

## 2025-04-07 RX ORDER — ONDANSETRON 2 MG/ML
8 INJECTION INTRAMUSCULAR; INTRAVENOUS
OUTPATIENT
Start: 2025-04-07

## 2025-04-07 RX ORDER — ACETAMINOPHEN 325 MG/1
650 TABLET ORAL
OUTPATIENT
Start: 2025-04-07

## 2025-04-07 RX ORDER — EPINEPHRINE 1 MG/ML
0.3 INJECTION, SOLUTION INTRAMUSCULAR; SUBCUTANEOUS PRN
OUTPATIENT
Start: 2025-04-07

## 2025-04-07 RX ADMIN — SODIUM CHLORIDE, PRESERVATIVE FREE 10 ML: 5 INJECTION INTRAVENOUS at 15:21

## 2025-04-07 RX ADMIN — SODIUM CHLORIDE, PRESERVATIVE FREE 10 ML: 5 INJECTION INTRAVENOUS at 14:50

## 2025-04-07 RX ADMIN — IRON SUCROSE 200 MG: 20 INJECTION, SOLUTION INTRAVENOUS at 15:06

## 2025-04-07 NOTE — PROGRESS NOTES
[facility] Visit Note    Pt arrived to [facility] ambulatory in no acute distress for Venofer 1/5. Assessment unremarkable except generalized fatigue/weakness. IV established in right forearm without issue and positive blood return noted.      Medications Administered         iron sucrose (VENOFER) 200 mg in sodium chloride 0.9 % 100 mL IVPB Admin Date  04/07/2025 Action  New Bag Dose  200 mg Rate  480 mL/hr Route  IntraVENous Documented By  Yeny Madden, RN        sodium chloride flush 0.9 % injection 5-40 mL Admin Date  04/07/2025 Action  Given Dose  10 mL Rate   Route  IntraVENous Documented By  Yeny Madden, RN        sodium chloride flush 0.9 % injection 5-40 mL Admin Date  04/07/2025 Action  Given Dose  10 mL Rate   Route  IntraVENous Documented By  Yeny Madden, RN           Patient Vitals for the past 24 hrs:   BP Temp Pulse Resp SpO2 Weight   04/07/25 1522 115/76 -- 91 16 -- --   04/07/25 1430 123/77 98.9 °F (37.2 °C) 83 16 97 % 72.6 kg (160 lb 1.6 oz)     Pt tolerated treatment well. Patient politely declined to stay for observation, no adverse reaction noted. IV flushed per policy and removed, 2x2 and coban placed.  Pt discharged ambulatory in no acute distress, accompanied by self.  No further appts scheduled. Patient aware that insurance only approved for patient to receive 1 dose at [facility] site. Patient advised to call his doctor and/or insurance to find where he can receive remainder of doses.

## 2025-04-09 ENCOUNTER — TELEPHONE (OUTPATIENT)
Age: 45
End: 2025-04-09

## 2025-04-09 NOTE — TELEPHONE ENCOUNTER
I believe believe she needs to contact infusion center directly.  They are both [facility] in fusion centers and the order should be good at both.  [facility]

## 2025-04-09 NOTE — TELEPHONE ENCOUNTER
Pt wife, Dede is calling to get the order for Infusion to be canceled at [facility]    The pt was advised from the insurance to have it changed to having the infusion at [facility]

## 2025-04-10 NOTE — TELEPHONE ENCOUNTER
ROSENDAM stating:  I believe believe she needs to contact infusion center directly.  They are both [facility] in fusion centers and the order should be good at both.  [facility]

## 2025-05-21 RX ORDER — HYDROXYZINE HYDROCHLORIDE 50 MG/1
50 TABLET, FILM COATED ORAL EVERY 8 HOURS PRN
Qty: 30 TABLET | Refills: 0 | Status: SHIPPED | OUTPATIENT
Start: 2025-05-21 | End: 2025-05-31

## 2025-06-05 RX ORDER — PANTOPRAZOLE SODIUM 40 MG/1
40 TABLET, DELAYED RELEASE ORAL DAILY
Qty: 90 TABLET | Refills: 3 | Status: SHIPPED | OUTPATIENT
Start: 2025-06-05

## (undated) DEVICE — NEEDLE HYPO 18GA L1.5IN PNK S STL HUB POLYPR SHLD REG BVL

## (undated) DEVICE — BASIN EMSIS 16OZ GRAPHITE PLAS KID SHP MOLD GRAD FOR ORAL

## (undated) DEVICE — FORCEPS BX L160CM DIA8MM GRSP DISECT CUP TIP NONLOCKING ROT

## (undated) DEVICE — NEONATAL-ADULT SPO2 SENSOR: Brand: NELLCOR

## (undated) DEVICE — Device

## (undated) DEVICE — CATH IV AUTOGRD BC PNK 20GA 25 -- INSYTE

## (undated) DEVICE — BLOCK BITE ENDOSCP AD 21 MM W/ DIL BLU LF DISP

## (undated) DEVICE — 1200 GUARD II KIT W/5MM TUBE W/O VAC TUBE: Brand: GUARDIAN

## (undated) DEVICE — DEVICE DEL CAP ADV 2.5MMX180CM --

## (undated) DEVICE — SOLIDIFIER MEDC 1200ML -- CONVERT TO 356117

## (undated) DEVICE — MEDI-VAC YANK SUCT HNDL W/TPRD BULBOUS TIP: Brand: CARDINAL HEALTH

## (undated) DEVICE — BAG SPEC BIOHZRD 10 X 10 IN --

## (undated) DEVICE — KENDALL RADIOLUCENT FOAM MONITORING ELECTRODE RECTANGULAR SHAPE: Brand: KENDALL

## (undated) DEVICE — SET ADMIN 16ML TBNG L100IN 2 Y INJ SITE IV PIGGY BK DISP

## (undated) DEVICE — Z DISCONTINUED PER MEDLINE LINE GAS SAMPLING O2/CO2 LNG AD 13 FT NSL W/ TBNG FILTERLINE

## (undated) DEVICE — CONTAINER SPEC 20 ML LID NEUT BUFF FORMALIN 10 % POLYPR STS

## (undated) DEVICE — SYR 10ML LUER LOK 1/5ML GRAD --

## (undated) DEVICE — TOWEL 4 PLY TISS 19X30 SUE WHT

## (undated) DEVICE — SYR 3ML LL TIP 1/10ML GRAD --